# Patient Record
Sex: FEMALE | Race: WHITE | NOT HISPANIC OR LATINO | Employment: FULL TIME | URBAN - METROPOLITAN AREA
[De-identification: names, ages, dates, MRNs, and addresses within clinical notes are randomized per-mention and may not be internally consistent; named-entity substitution may affect disease eponyms.]

---

## 2024-09-19 ENCOUNTER — APPOINTMENT (OUTPATIENT)
Dept: CT IMAGING | Facility: HOSPITAL | Age: 44
End: 2024-09-19
Payer: COMMERCIAL

## 2024-09-19 ENCOUNTER — APPOINTMENT (OUTPATIENT)
Dept: GENERAL RADIOLOGY | Facility: HOSPITAL | Age: 44
End: 2024-09-19
Payer: COMMERCIAL

## 2024-09-19 ENCOUNTER — HOSPITAL ENCOUNTER (EMERGENCY)
Facility: HOSPITAL | Age: 44
Discharge: HOME | End: 2024-09-19
Attending: EMERGENCY MEDICINE
Payer: COMMERCIAL

## 2024-09-19 VITALS
RESPIRATION RATE: 20 BRPM | SYSTOLIC BLOOD PRESSURE: 159 MMHG | OXYGEN SATURATION: 98 % | HEIGHT: 62 IN | BODY MASS INDEX: 41.79 KG/M2 | DIASTOLIC BLOOD PRESSURE: 88 MMHG | WEIGHT: 227.1 LBS | HEART RATE: 99 BPM | TEMPERATURE: 97.3 F

## 2024-09-19 DIAGNOSIS — N83.202 CYSTS OF BOTH OVARIES: ICD-10-CM

## 2024-09-19 DIAGNOSIS — R93.5 ABNORMAL CT OF THE ABDOMEN: ICD-10-CM

## 2024-09-19 DIAGNOSIS — R10.84 GENERALIZED ABDOMINAL PAIN: ICD-10-CM

## 2024-09-19 DIAGNOSIS — M25.561 ACUTE PAIN OF RIGHT KNEE: ICD-10-CM

## 2024-09-19 DIAGNOSIS — N83.201 CYSTS OF BOTH OVARIES: ICD-10-CM
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
## 2024-09-19 DIAGNOSIS — R93.89 ABNORMAL CT OF THE CHEST: ICD-10-CM

## 2024-09-19 DIAGNOSIS — S20.219A CONTUSION OF CHEST WALL, UNSPECIFIED LATERALITY, INITIAL ENCOUNTER: Primary | ICD-10-CM

## 2024-09-19 DIAGNOSIS — M54.50 LEFT LUMBAR PAIN: ICD-10-CM

## 2024-09-19 LAB
ALBUMIN SERPL-MCNC: 4.4 G/DL (ref 3.5–5.2)
ALBUMIN/GLOB SERPL: 1.4 G/DL
ALP SERPL-CCNC: 63 U/L (ref 39–117)
ALT SERPL W P-5'-P-CCNC: 46 U/L (ref 1–33)
ANION GAP SERPL CALCULATED.3IONS-SCNC: 9.3 MMOL/L (ref 5–15)
AST SERPL-CCNC: 34 U/L (ref 1–32)
B-HCG UR QL: NEGATIVE
BACTERIA UR QL AUTO: ABNORMAL /HPF
BASOPHILS # BLD AUTO: 0.04 10*3/MM3 (ref 0–0.2)
BASOPHILS NFR BLD AUTO: 0.4 % (ref 0–1.5)
BILIRUB SERPL-MCNC: 0.3 MG/DL (ref 0–1.2)
BILIRUB UR QL STRIP: NEGATIVE
BUN SERPL-MCNC: 13 MG/DL (ref 6–20)
BUN/CREAT SERPL: 14 (ref 7–25)
CALCIUM SPEC-SCNC: 9.8 MG/DL (ref 8.6–10.5)
CHLORIDE SERPL-SCNC: 103 MMOL/L (ref 98–107)
CLARITY UR: CLEAR
CO2 SERPL-SCNC: 23.7 MMOL/L (ref 22–29)
COD CRY URNS QL: ABNORMAL /HPF
COLOR UR: YELLOW
CREAT SERPL-MCNC: 0.93 MG/DL (ref 0.57–1)
DEPRECATED RDW RBC AUTO: 45 FL (ref 37–54)
EGFRCR SERPLBLD CKD-EPI 2021: 78.9 ML/MIN/1.73
EOSINOPHIL # BLD AUTO: 0.13 10*3/MM3 (ref 0–0.4)
EOSINOPHIL NFR BLD AUTO: 1.2 % (ref 0.3–6.2)
ERYTHROCYTE [DISTWIDTH] IN BLOOD BY AUTOMATED COUNT: 12.8 % (ref 12.3–15.4)
GLOBULIN UR ELPH-MCNC: 3.1 GM/DL
GLUCOSE SERPL-MCNC: 85 MG/DL (ref 65–99)
GLUCOSE UR STRIP-MCNC: NEGATIVE MG/DL
HCT VFR BLD AUTO: 43.7 % (ref 34–46.6)
HGB BLD-MCNC: 14.2 G/DL (ref 12–15.9)
HGB UR QL STRIP.AUTO: ABNORMAL
HYALINE CASTS UR QL AUTO: ABNORMAL /LPF
IMM GRANULOCYTES # BLD AUTO: 0.04 10*3/MM3 (ref 0–0.05)
IMM GRANULOCYTES NFR BLD AUTO: 0.4 % (ref 0–0.5)
KETONES UR QL STRIP: ABNORMAL
LEUKOCYTE ESTERASE UR QL STRIP.AUTO: ABNORMAL
LYMPHOCYTES # BLD AUTO: 2.91 10*3/MM3 (ref 0.7–3.1)
LYMPHOCYTES NFR BLD AUTO: 27 % (ref 19.6–45.3)
MCH RBC QN AUTO: 31 PG (ref 26.6–33)
MCHC RBC AUTO-ENTMCNC: 32.5 G/DL (ref 31.5–35.7)
MCV RBC AUTO: 95.4 FL (ref 79–97)
MONOCYTES # BLD AUTO: 0.48 10*3/MM3 (ref 0.1–0.9)
MONOCYTES NFR BLD AUTO: 4.5 % (ref 5–12)
NEUTROPHILS NFR BLD AUTO: 66.5 % (ref 42.7–76)
NEUTROPHILS NFR BLD AUTO: 7.18 10*3/MM3 (ref 1.7–7)
NITRITE UR QL STRIP: NEGATIVE
PH UR STRIP.AUTO: 5.5 [PH] (ref 5–8)
PLATELET # BLD AUTO: 418 10*3/MM3 (ref 140–450)
PMV BLD AUTO: 9.9 FL (ref 6–12)
POTASSIUM SERPL-SCNC: 4.3 MMOL/L (ref 3.5–5.2)
PROT SERPL-MCNC: 7.5 G/DL (ref 6–8.5)
PROT UR QL STRIP: NEGATIVE
RBC # BLD AUTO: 4.58 10*6/MM3 (ref 3.77–5.28)
RBC # UR STRIP: ABNORMAL /HPF
REF LAB TEST METHOD: ABNORMAL
SODIUM SERPL-SCNC: 136 MMOL/L (ref 136–145)
SP GR UR STRIP: 1.02 (ref 1–1.03)
SQUAMOUS #/AREA URNS HPF: ABNORMAL /HPF
UROBILINOGEN UR QL STRIP: ABNORMAL
WBC # UR STRIP: ABNORMAL /HPF
WBC NRBC COR # BLD AUTO: 10.78 10*3/MM3 (ref 3.4–10.8)

## 2024-09-19 PROCEDURE — 81001 URINALYSIS AUTO W/SCOPE: CPT | Performed by: NURSE PRACTITIONER

## 2024-09-19 PROCEDURE — 87086 URINE CULTURE/COLONY COUNT: CPT | Performed by: NURSE PRACTITIONER

## 2024-09-19 PROCEDURE — 25510000001 IOPAMIDOL PER 1 ML: Performed by: EMERGENCY MEDICINE

## 2024-09-19 PROCEDURE — 85025 COMPLETE CBC W/AUTO DIFF WBC: CPT | Performed by: NURSE PRACTITIONER

## 2024-09-19 PROCEDURE — 81025 URINE PREGNANCY TEST: CPT | Performed by: NURSE PRACTITIONER

## 2024-09-19 PROCEDURE — 80053 COMPREHEN METABOLIC PANEL: CPT | Performed by: NURSE PRACTITIONER

## 2024-09-19 PROCEDURE — 74177 CT ABD & PELVIS W/CONTRAST: CPT

## 2024-09-19 PROCEDURE — 73560 X-RAY EXAM OF KNEE 1 OR 2: CPT

## 2024-09-19 PROCEDURE — 71260 CT THORAX DX C+: CPT

## 2024-09-19 PROCEDURE — 99285 EMERGENCY DEPT VISIT HI MDM: CPT

## 2024-09-19 RX ORDER — DIAZEPAM 5 MG
5 TABLET ORAL ONCE
Status: COMPLETED | OUTPATIENT
Start: 2024-09-19 | End: 2024-09-19

## 2024-09-19 RX ORDER — IOPAMIDOL 755 MG/ML
100 INJECTION, SOLUTION INTRAVASCULAR
Status: COMPLETED | OUTPATIENT
Start: 2024-09-19 | End: 2024-09-19

## 2024-09-19 RX ORDER — LIDOCAINE 50 MG/G
3 PATCH TOPICAL EVERY 24 HOURS
Qty: 5 PATCH | Refills: 0 | Status: SHIPPED | OUTPATIENT
Start: 2024-09-19

## 2024-09-19 RX ORDER — SODIUM CHLORIDE 0.9 % (FLUSH) 0.9 %
10 SYRINGE (ML) INJECTION AS NEEDED
Status: DISCONTINUED | OUTPATIENT
Start: 2024-09-19 | End: 2024-09-19 | Stop reason: HOSPADM

## 2024-09-19 RX ADMIN — DIAZEPAM 5 MG: 5 TABLET ORAL at 14:17

## 2024-09-19 RX ADMIN — IOPAMIDOL 100 ML: 755 INJECTION, SOLUTION INTRAVENOUS at 15:10

## 2024-09-20 LAB — BACTERIA SPEC AEROBE CULT: NO GROWTH

## 2024-09-30 ENCOUNTER — OFFICE VISIT (OUTPATIENT)
Dept: FAMILY MEDICINE CLINIC | Facility: CLINIC | Age: 44
End: 2024-09-30
Payer: COMMERCIAL

## 2024-09-30 VITALS
SYSTOLIC BLOOD PRESSURE: 120 MMHG | HEART RATE: 89 BPM | OXYGEN SATURATION: 98 % | HEIGHT: 63 IN | WEIGHT: 228.3 LBS | BODY MASS INDEX: 40.45 KG/M2 | TEMPERATURE: 97.5 F | RESPIRATION RATE: 18 BRPM | DIASTOLIC BLOOD PRESSURE: 82 MMHG

## 2024-09-30 DIAGNOSIS — N83.201 BILATERAL OVARIAN CYSTS: ICD-10-CM

## 2024-09-30 DIAGNOSIS — M51.369 LUMBAR DEGENERATIVE DISC DISEASE: ICD-10-CM

## 2024-09-30 DIAGNOSIS — Z76.89 ENCOUNTER TO ESTABLISH CARE: ICD-10-CM

## 2024-09-30 DIAGNOSIS — N83.202 BILATERAL OVARIAN CYSTS: ICD-10-CM

## 2024-09-30 DIAGNOSIS — N63.20 MASS OF LEFT BREAST, UNSPECIFIED QUADRANT: Primary | ICD-10-CM

## 2024-09-30 DIAGNOSIS — Z12.4 PAP SMEAR FOR CERVICAL CANCER SCREENING: ICD-10-CM

## 2024-09-30 DIAGNOSIS — K76.0 HEPATIC STEATOSIS: ICD-10-CM

## 2024-09-30 PROBLEM — L40.9 PSORIASIS: Status: ACTIVE | Noted: 2024-09-30

## 2024-09-30 PROCEDURE — 99213 OFFICE O/P EST LOW 20 MIN: CPT | Performed by: NURSE PRACTITIONER

## 2024-09-30 RX ORDER — CLOBETASOL PROPIONATE 0.5 MG/ML
SOLUTION TOPICAL
COMMUNITY
Start: 2024-09-05

## 2024-09-30 RX ORDER — CITALOPRAM HYDROBROMIDE 10 MG/1
10 TABLET ORAL DAILY
COMMUNITY
Start: 2024-09-11

## 2024-09-30 RX ORDER — BUPROPION HYDROCHLORIDE 150 MG/1
150 TABLET, FILM COATED, EXTENDED RELEASE ORAL 2 TIMES DAILY
COMMUNITY
Start: 2024-09-11

## 2024-09-30 RX ORDER — TRAZODONE HYDROCHLORIDE 50 MG/1
50 TABLET, FILM COATED ORAL NIGHTLY
COMMUNITY
Start: 2024-09-11
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
## 2024-10-03 ENCOUNTER — TREATMENT (OUTPATIENT)
Dept: PHYSICAL THERAPY | Facility: CLINIC | Age: 44
End: 2024-10-03
Payer: COMMERCIAL

## 2024-10-03 DIAGNOSIS — M53.3 SI (SACROILIAC) JOINT DYSFUNCTION: ICD-10-CM

## 2024-10-03 DIAGNOSIS — M54.50 LEFT LUMBAR PAIN: ICD-10-CM

## 2024-10-03 DIAGNOSIS — R26.9 GAIT ABNORMALITY: ICD-10-CM
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
## 2024-10-03 DIAGNOSIS — M25.561 ACUTE PAIN OF RIGHT KNEE: ICD-10-CM

## 2024-10-03 DIAGNOSIS — S20.219D CONTUSION OF CHEST WALL, UNSPECIFIED LATERALITY, SUBSEQUENT ENCOUNTER: Primary | ICD-10-CM
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
## 2024-10-10 ENCOUNTER — TREATMENT (OUTPATIENT)
Dept: PHYSICAL THERAPY | Facility: CLINIC | Age: 44
End: 2024-10-10
Payer: COMMERCIAL
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
## 2024-10-10 DIAGNOSIS — M53.3 SI (SACROILIAC) JOINT DYSFUNCTION: ICD-10-CM

## 2024-10-10 DIAGNOSIS — M54.50 LEFT LUMBAR PAIN: ICD-10-CM

## 2024-10-10 DIAGNOSIS — R26.9 GAIT ABNORMALITY: ICD-10-CM

## 2024-10-10 DIAGNOSIS — M25.561 ACUTE PAIN OF RIGHT KNEE: ICD-10-CM

## 2024-10-10 DIAGNOSIS — S20.219D CONTUSION OF CHEST WALL, UNSPECIFIED LATERALITY, SUBSEQUENT ENCOUNTER: Primary | ICD-10-CM
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
## 2024-10-15 ENCOUNTER — TELEPHONE (OUTPATIENT)
Dept: PHYSICAL THERAPY | Facility: CLINIC | Age: 44
End: 2024-10-15

## 2024-10-15 NOTE — TELEPHONE ENCOUNTER
Caller: Mary Ann Fletcher    Relationship: Self       What was the call regarding: WOKE UP SICK

## 2024-10-17 ENCOUNTER — HOSPITAL ENCOUNTER (OUTPATIENT)
Dept: ULTRASOUND IMAGING | Facility: HOSPITAL | Age: 44
Discharge: HOME | End: 2024-10-17
Payer: COMMERCIAL

## 2024-10-17 ENCOUNTER — TREATMENT (OUTPATIENT)
Dept: PHYSICAL THERAPY | Facility: CLINIC | Age: 44
End: 2024-10-17
Payer: COMMERCIAL

## 2024-10-17 ENCOUNTER — HOSPITAL ENCOUNTER (OUTPATIENT)
Dept: MAMMOGRAPHY | Facility: HOSPITAL | Age: 44
Discharge: HOME | End: 2024-10-17
Payer: COMMERCIAL

## 2024-10-17 DIAGNOSIS — S20.219D CONTUSION OF CHEST WALL, UNSPECIFIED LATERALITY, SUBSEQUENT ENCOUNTER: Primary | ICD-10-CM

## 2024-10-17 DIAGNOSIS — M53.3 SI (SACROILIAC) JOINT DYSFUNCTION: ICD-10-CM

## 2024-10-17 DIAGNOSIS — N63.20 MASS OF LEFT BREAST, UNSPECIFIED QUADRANT: ICD-10-CM

## 2024-10-17 DIAGNOSIS — R26.9 GAIT ABNORMALITY: ICD-10-CM

## 2024-10-17 DIAGNOSIS — M54.50 LEFT LUMBAR PAIN: ICD-10-CM

## 2024-10-17 DIAGNOSIS — M25.561 ACUTE PAIN OF RIGHT KNEE: ICD-10-CM
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
## 2024-10-17 PROCEDURE — 77066 DX MAMMO INCL CAD BI: CPT

## 2024-10-17 PROCEDURE — 76642 ULTRASOUND BREAST LIMITED: CPT

## 2024-10-17 PROCEDURE — 76882 US LMTD JT/FCL EVL NVASC XTR: CPT

## 2024-10-17 PROCEDURE — G0279 TOMOSYNTHESIS, MAMMO: HCPCS
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
## 2024-10-20 DIAGNOSIS — N63.20 MASS OF LEFT BREAST, UNSPECIFIED QUADRANT: Primary | ICD-10-CM

## 2024-10-31 ENCOUNTER — HOSPITAL ENCOUNTER (OUTPATIENT)
Dept: MAMMOGRAPHY | Facility: HOSPITAL | Age: 44
Discharge: HOME | End: 2024-10-31
Payer: COMMERCIAL

## 2024-10-31 ENCOUNTER — HOSPITAL ENCOUNTER (OUTPATIENT)
Dept: ULTRASOUND IMAGING | Facility: HOSPITAL | Age: 44
Discharge: HOME | End: 2024-10-31
Payer: COMMERCIAL

## 2024-10-31 DIAGNOSIS — N63.20 MASS OF LEFT BREAST ON MAMMOGRAM: ICD-10-CM

## 2024-10-31 DIAGNOSIS — R92.1 BREAST CALCIFICATIONS: ICD-10-CM

## 2024-10-31 DIAGNOSIS — R92.8 ABNORMAL MAMMOGRAM: ICD-10-CM

## 2024-10-31 PROCEDURE — 76942 ECHO GUIDE FOR BIOPSY: CPT

## 2024-10-31 PROCEDURE — A4648 IMPLANTABLE TISSUE MARKER: HCPCS

## 2024-10-31 PROCEDURE — 76098 X-RAY EXAM SURGICAL SPECIMEN: CPT

## 2024-10-31 PROCEDURE — 25010000002 LIDOCAINE 1 % SOLUTION: Performed by: NURSE PRACTITIONER

## 2024-10-31 PROCEDURE — 25010000002 LIDOCAINE 1% - EPINEPHRINE 1:100000 1 %-1:100000 SOLUTION: Performed by: NURSE PRACTITIONER

## 2024-10-31 RX ORDER — LIDOCAINE HYDROCHLORIDE 10 MG/ML
10 INJECTION, SOLUTION INFILTRATION; PERINEURAL ONCE
Status: COMPLETED | OUTPATIENT
Start: 2024-10-31 | End: 2024-10-31

## 2024-10-31 RX ORDER — LIDOCAINE HYDROCHLORIDE 10 MG/ML
3 INJECTION, SOLUTION INFILTRATION; PERINEURAL ONCE
Status: COMPLETED | OUTPATIENT
Start: 2024-10-31 | End: 2024-10-31

## 2024-10-31 RX ORDER — LIDOCAINE HYDROCHLORIDE AND EPINEPHRINE 10; 10 MG/ML; UG/ML
8 INJECTION, SOLUTION INFILTRATION; PERINEURAL ONCE
Status: COMPLETED | OUTPATIENT
Start: 2024-10-31 | End: 2024-10-31

## 2024-10-31 RX ORDER — LIDOCAINE HYDROCHLORIDE 10 MG/ML
5 INJECTION, SOLUTION INFILTRATION; PERINEURAL ONCE
Status: COMPLETED | OUTPATIENT
Start: 2024-10-31 | End: 2024-10-31

## 2024-10-31 RX ADMIN — LIDOCAINE HYDROCHLORIDE,EPINEPHRINE BITARTRATE 5 ML: 10; .01 INJECTION, SOLUTION INFILTRATION; PERINEURAL at 13:28

## 2024-10-31 RX ADMIN — Medication 5 ML: at 13:59

## 2024-10-31 RX ADMIN — LIDOCAINE HYDROCHLORIDE 10 ML: 10 INJECTION, SOLUTION INFILTRATION; PERINEURAL at 13:59

## 2024-10-31 RX ADMIN — Medication 1 ML: at 13:28

## 2024-11-04 ENCOUNTER — TELEPHONE (OUTPATIENT)
Dept: PHYSICAL THERAPY | Facility: CLINIC | Age: 44
End: 2024-11-04

## 2024-11-04 NOTE — TELEPHONE ENCOUNTER
LEFT MESSAGE FOR PATIENT TO CALL BACK REGARDING PT APPOINTMENT. ON SCHEDULE FOR TODAY 11/4 AND 11/7.

## 2024-11-05 ENCOUNTER — TELEPHONE (OUTPATIENT)
Dept: FAMILY MEDICINE CLINIC | Facility: CLINIC | Age: 44
End: 2024-11-05
Payer: COMMERCIAL

## 2024-11-07 ENCOUNTER — PATIENT OUTREACH (OUTPATIENT)
Dept: ONCOLOGY | Facility: CLINIC | Age: 44
End: 2024-11-07
Payer: COMMERCIAL

## 2024-11-07 ENCOUNTER — OFFICE VISIT (OUTPATIENT)
Age: 44
End: 2024-11-07
Payer: COMMERCIAL

## 2024-11-07 VITALS
BODY MASS INDEX: 40.4 KG/M2 | HEART RATE: 80 BPM | WEIGHT: 228 LBS | RESPIRATION RATE: 16 BRPM | HEIGHT: 63 IN | SYSTOLIC BLOOD PRESSURE: 131 MMHG | OXYGEN SATURATION: 98 % | DIASTOLIC BLOOD PRESSURE: 90 MMHG | TEMPERATURE: 98.7 F

## 2024-11-07 DIAGNOSIS — Z17.0 BREAST CANCER, STAGE 1, ESTROGEN RECEPTOR POSITIVE, LEFT: Primary | ICD-10-CM

## 2024-11-07 DIAGNOSIS — C50.912 BREAST CANCER, STAGE 1, ESTROGEN RECEPTOR POSITIVE, LEFT: Primary | ICD-10-CM

## 2024-11-07 LAB — GLOBAL PROGNOSTIC (BREAST) RESULT: NORMAL

## 2024-11-07 NOTE — LETTER
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 of additional surgery if her margins are close or positive after lumpectomy. She understands that breast conservation and mastectomy have shown equivalent long-term overall survival with 20-30 years of followup.     Unfortunately given the position of her tumor posteriorly and the extent of calcifications biopsy-proven to be DCIS extending to the nipple the volume of breast tissue to be removed precludes breast conservation with a reasonable cosmetic outcome and we discussed mastectomy.    With this in mind, I explained that even the earliest cancers can spread beyond the breast. One of the treatment goals is to determine the likelihood of micrometastatic disease and to treat it appropriately. The use of axillary staging was discussed as well as the use of a radioactive tracer and/or blue dye to locate the sentinel node. She was made aware that further axillary surgery maybe indicated and is dependent on final pathology results.      The use of sentinel node biopsy for axillary staging, along with the risks and benefits of the procedure itself, in both breast conservation and mastectomy were discussed, along with the possibility/reasons for needing to return for full axillary  dissection.     We then discussed how this information would be used. I explained that she will always receive some form of systemic therapy.  Adjuvant systemic therapy including chemotherapy and endocrine were reviewed, and we would anticipate endocrine therapy postoperatively for 5-10 years. She understands the final recommendations for systemic therapy will be based on the pathologic results from surgery and will be administered by a medical oncologist; we also discussed that in patients with negative nodes, we typically recommend Oncotype testing to aid in postop systemic therapy considerations.     Radiation therapy was reviewed.  Final treatment recommendations for radiation therapy will be based upon pathologic stage and will be administered by a radiation oncologist.     Finally, I brought up the issue of genetic counseling and testing.  Given her age she will be referred to genetic counseling.    The risks and benefits of the surgical procedure were discussed with the patient, including but not limited to bleeding, infection, scar formation and lymphedema.  Cosmetic ramifications of the surgical procedure were reviewed. I reviewed the risks of the SLNB procedure including lymphedema and sensory changes in the axilla/arm.    Given the size of her tumor, if the HER2 receptor is positive there is a possibility of neoadjuvant therapy and she will be referred to medical oncology for further discussion of systemic therapy.  The sequence of therapy will depend on HER2 status.      Class 3 Severe Obesity (BMI >=40). Obesity-related health conditions include the following: none. Obesity is unchanged. BMI is  above average, had been on GLP-1 antagonist but holding these during workup for her breast cancer . We discussed portion control, increasing exercise, and the relationship between excess body fat and breast cancer risk. .         Signed:    Gonzalez Vanessa MD  Breast Surgical Oncology  [facility]
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
## 2024-11-07 NOTE — PROGRESS NOTES
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 beyond the breast. One of the treatment goals is to determine the likelihood of micrometastatic disease and to treat it appropriately. The use of axillary staging was discussed as well as the use of a radioactive tracer and/or blue dye to locate the sentinel node. She was made aware that further axillary surgery maybe indicated and is dependent on final pathology results.      The use of sentinel node biopsy for axillary staging, along with the risks and benefits of the procedure itself, in both breast conservation and mastectomy were discussed, along with the possibility/reasons for needing to return for full axillary dissection.     We then discussed how this information would be used. I explained that she will always receive some form of systemic therapy.  Adjuvant systemic therapy including chemotherapy and endocrine were reviewed, and we would anticipate endocrine therapy postoperatively for 5-10 years. She understands the final recommendations for systemic therapy will be based on the pathologic results from surgery and will be administered by a medical oncologist; we also discussed that in patients with negative nodes, we typically recommend Oncotype testing to aid in postop systemic therapy considerations.     Radiation therapy was reviewed.  Final  treatment recommendations for radiation therapy will be based upon pathologic stage and will be administered by a radiation oncologist.     Finally, I brought up the issue of genetic counseling and testing.  Given her age she will be referred to genetic counseling.    The risks and benefits of the surgical procedure were discussed with the patient, including but not limited to bleeding, infection, scar formation and lymphedema.  Cosmetic ramifications of the surgical procedure were reviewed. I reviewed the risks of the SLNB procedure including lymphedema and sensory changes in the axilla/arm.    Given the size of her tumor, if the HER2 receptor is positive there is a possibility of neoadjuvant therapy and she will be referred to medical oncology for further discussion of systemic therapy.  The sequence of therapy will depend on HER2 status.      Class 3 Severe Obesity (BMI >=40). Obesity-related health conditions include the following: none. Obesity is unchanged. BMI is  above average, had been on GLP-1 antagonist but holding these during workup for her breast cancer . We discussed portion control, increasing exercise, and the relationship between excess body fat and breast cancer risk. .         Signed:    Gonzalez Vanessa MD  Breast Surgical Oncology  [facility]

## 2024-11-09 LAB — HER2/NEU RESULT: NORMAL

## 2024-11-10 LAB — CCV RESULT: NORMAL

## 2024-11-11 LAB
LAB AP CASE REPORT: NORMAL
LAB AP DIAGNOSIS COMMENT: NORMAL
LAB AP FISH HER2/NEU REPORT,ADDENDUM: NORMAL
LAB AP IHC HER2/NEU REPORT,ADDENDUM: NORMAL
LAB AP SPECIAL STAINS: NORMAL
PATH REPORT.FINAL DX SPEC: NORMAL
PATH REPORT.GROSS SPEC: NORMAL
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
## 2024-11-13 ENCOUNTER — PATIENT OUTREACH (OUTPATIENT)
Dept: ONCOLOGY | Facility: CLINIC | Age: 44
End: 2024-11-13
Payer: COMMERCIAL

## 2024-11-13 ENCOUNTER — LAB (OUTPATIENT)
Dept: LAB | Facility: HOSPITAL | Age: 44
End: 2024-11-13
Payer: COMMERCIAL

## 2024-11-13 ENCOUNTER — CONSULT (OUTPATIENT)
Dept: ONCOLOGY | Facility: CLINIC | Age: 44
End: 2024-11-13
Payer: COMMERCIAL

## 2024-11-13 VITALS
WEIGHT: 232 LBS | HEIGHT: 63 IN | DIASTOLIC BLOOD PRESSURE: 70 MMHG | HEART RATE: 78 BPM | SYSTOLIC BLOOD PRESSURE: 120 MMHG | OXYGEN SATURATION: 98 % | TEMPERATURE: 98 F | BODY MASS INDEX: 41.11 KG/M2 | RESPIRATION RATE: 18 BRPM

## 2024-11-13 DIAGNOSIS — Z17.0 BREAST CANCER, STAGE 1, ESTROGEN RECEPTOR POSITIVE, LEFT: Primary | ICD-10-CM

## 2024-11-13 DIAGNOSIS — C50.912 BREAST CANCER, STAGE 1, ESTROGEN RECEPTOR POSITIVE, LEFT: Primary | ICD-10-CM

## 2024-11-13 DIAGNOSIS — Z51.81 ENCOUNTER FOR MONITORING CARDIOTOXIC DRUG THERAPY: ICD-10-CM

## 2024-11-13 DIAGNOSIS — Z79.899 ENCOUNTER FOR MONITORING CARDIOTOXIC DRUG THERAPY: ICD-10-CM

## 2024-11-13 LAB
BASOPHILS # BLD AUTO: 0.03 10*3/MM3 (ref 0–0.2)
BASOPHILS NFR BLD AUTO: 0.3 % (ref 0–1.5)
DEPRECATED RDW RBC AUTO: 45.5 FL (ref 37–54)
EOSINOPHIL # BLD AUTO: 0.12 10*3/MM3 (ref 0–0.4)
EOSINOPHIL NFR BLD AUTO: 1.2 % (ref 0.3–6.2)
ERYTHROCYTE [DISTWIDTH] IN BLOOD BY AUTOMATED COUNT: 13.4 % (ref 12.3–15.4)
HCT VFR BLD AUTO: 40.4 % (ref 34–46.6)
HGB BLD-MCNC: 13.4 G/DL (ref 12–15.9)
HOLD SPECIMEN: NORMAL
HOLD SPECIMEN: NORMAL
LYMPHOCYTES # BLD AUTO: 3.88 10*3/MM3 (ref 0.7–3.1)
LYMPHOCYTES NFR BLD AUTO: 39.4 % (ref 19.6–45.3)
MCH RBC QN AUTO: 32.2 PG (ref 26.6–33)
MCHC RBC AUTO-ENTMCNC: 33.2 G/DL (ref 31.5–35.7)
MCV RBC AUTO: 97.1 FL (ref 79–97)
MONOCYTES # BLD AUTO: 0.5 10*3/MM3 (ref 0.1–0.9)
MONOCYTES NFR BLD AUTO: 5.1 % (ref 5–12)
NEUTROPHILS NFR BLD AUTO: 5.32 10*3/MM3 (ref 1.7–7)
NEUTROPHILS NFR BLD AUTO: 54 % (ref 42.7–76)
PLATELET # BLD AUTO: 350 10*3/MM3 (ref 140–450)
PMV BLD AUTO: 10.3 FL (ref 6–12)
RBC # BLD AUTO: 4.16 10*6/MM3 (ref 3.77–5.28)
WBC NRBC COR # BLD AUTO: 9.85 10*3/MM3 (ref 3.4–10.8)

## 2024-11-13 PROCEDURE — 85025 COMPLETE CBC W/AUTO DIFF WBC: CPT

## 2024-11-13 PROCEDURE — 36415 COLL VENOUS BLD VENIPUNCTURE: CPT

## 2024-11-13 NOTE — PROGRESS NOTES
I accompanied the patient and her sister to her appointment with Dr. Saldivar.    Dr. Saldivar discussed the patient's pathology and imaging results.  The patient's Her2 came back positive.  Dr. Saldivar discussed that she would like for the patient to have a Breast MRI to help determine the true size of the breast mass and to determine if there is chest wall invasion.      If the tumor is larger or invading the chest wall, she will have neoadjuvant chemotherapy with TCHP.    If the tumor is not changed in size, then she will proceed with surgery and then she will proceed with adjuvant chemotherapy with TH.    The patient will need a 2D echo.                                                         Breast mass measures 5 x 3.5 cm up on exam.     Breast MRI 11/20/24 at 4:30pm  2D echo on 11/19/24 at 7:30am.    The patient was given several resources to review.
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
## 2024-11-15 ENCOUNTER — OFFICE VISIT (OUTPATIENT)
Dept: FAMILY MEDICINE CLINIC | Facility: CLINIC | Age: 44
End: 2024-11-15
Payer: COMMERCIAL

## 2024-11-15 VITALS
DIASTOLIC BLOOD PRESSURE: 82 MMHG | WEIGHT: 235 LBS | HEART RATE: 103 BPM | TEMPERATURE: 96.5 F | OXYGEN SATURATION: 98 % | RESPIRATION RATE: 18 BRPM | SYSTOLIC BLOOD PRESSURE: 126 MMHG | HEIGHT: 63 IN | BODY MASS INDEX: 41.64 KG/M2

## 2024-11-15 DIAGNOSIS — F41.9 ANXIETY AND DEPRESSION: Primary | ICD-10-CM

## 2024-11-15 DIAGNOSIS — F32.A ANXIETY AND DEPRESSION: Primary | ICD-10-CM

## 2024-11-15 PROCEDURE — 99214 OFFICE O/P EST MOD 30 MIN: CPT | Performed by: NURSE PRACTITIONER

## 2024-11-15 RX ORDER — PROPRANOLOL HYDROCHLORIDE 10 MG/1
10 TABLET ORAL 3 TIMES DAILY PRN
Qty: 90 TABLET | Refills: 0 | Status: SHIPPED | OUTPATIENT
Start: 2024-11-15

## 2024-11-15 RX ORDER — BUPROPION HYDROCHLORIDE 150 MG/1
150 TABLET ORAL DAILY
Qty: 90 TABLET | Refills: 0 | Status: SHIPPED | OUTPATIENT
Start: 2024-11-15

## 2024-11-15 RX ORDER — VILAZODONE HYDROCHLORIDE 20 MG/1
20 TABLET ORAL DAILY
Qty: 90 TABLET | Refills: 0 | Status: SHIPPED | OUTPATIENT
Start: 2024-11-15

## 2024-11-18 ENCOUNTER — HOSPITAL ENCOUNTER (OUTPATIENT)
Dept: MRI IMAGING | Facility: HOSPITAL | Age: 44
Discharge: HOME | End: 2024-11-18
Admitting: INTERNAL MEDICINE
Payer: COMMERCIAL

## 2024-11-18 DIAGNOSIS — C50.912 BREAST CANCER, STAGE 1, ESTROGEN RECEPTOR POSITIVE, LEFT: ICD-10-CM

## 2024-11-18 DIAGNOSIS — Z17.0 BREAST CANCER, STAGE 1, ESTROGEN RECEPTOR POSITIVE, LEFT: ICD-10-CM

## 2024-11-18 PROCEDURE — A9579 GAD-BASE MR CONTRAST NOS,1ML: HCPCS | Performed by: INTERNAL MEDICINE

## 2024-11-18 PROCEDURE — 77049 MRI BREAST C-+ W/CAD BI: CPT

## 2024-11-18 PROCEDURE — 25010000002 GADOTERIDOL PER 1 ML: Performed by: INTERNAL MEDICINE

## 2024-11-18 RX ADMIN — GADOTERIDOL 20 ML: 279.3 INJECTION, SOLUTION INTRAVENOUS at 13:38

## 2024-11-19 ENCOUNTER — PATIENT OUTREACH (OUTPATIENT)
Dept: ONCOLOGY | Facility: CLINIC | Age: 44
End: 2024-11-19
Payer: COMMERCIAL

## 2024-11-19 ENCOUNTER — HOSPITAL ENCOUNTER (OUTPATIENT)
Dept: CARDIOLOGY | Facility: HOSPITAL | Age: 44
Discharge: HOME | End: 2024-11-19
Admitting: INTERNAL MEDICINE
Payer: COMMERCIAL

## 2024-11-19 VITALS
HEIGHT: 63 IN | HEART RATE: 74 BPM | SYSTOLIC BLOOD PRESSURE: 162 MMHG | BODY MASS INDEX: 41.64 KG/M2 | DIASTOLIC BLOOD PRESSURE: 66 MMHG | WEIGHT: 235 LBS

## 2024-11-19 DIAGNOSIS — Z17.0 BREAST CANCER, STAGE 1, ESTROGEN RECEPTOR POSITIVE, LEFT: ICD-10-CM

## 2024-11-19 DIAGNOSIS — Z51.81 ENCOUNTER FOR MONITORING CARDIOTOXIC DRUG THERAPY: ICD-10-CM

## 2024-11-19 DIAGNOSIS — C50.912 BREAST CANCER, STAGE 1, ESTROGEN RECEPTOR POSITIVE, LEFT: ICD-10-CM

## 2024-11-19 DIAGNOSIS — C50.912 BREAST CANCER, STAGE 2, LEFT: Primary | ICD-10-CM

## 2024-11-19 DIAGNOSIS — Z79.899 ENCOUNTER FOR MONITORING CARDIOTOXIC DRUG THERAPY: ICD-10-CM

## 2024-11-19 LAB
BH CV ECHO LEFT VENTRICLE GLOBAL LONGITUDINAL STRAIN: -18 %
BH CV ECHO MEAS - AO MAX PG: 5.9 MMHG
BH CV ECHO MEAS - AO MEAN PG: 3.3 MMHG
BH CV ECHO MEAS - AO ROOT DIAM: 2.9 CM
BH CV ECHO MEAS - AO V2 MAX: 121 CM/SEC
BH CV ECHO MEAS - AO V2 VTI: 26.7 CM
BH CV ECHO MEAS - AVA(I,D): 1.86 CM2
BH CV ECHO MEAS - EDV(CUBED): 60.1 ML
BH CV ECHO MEAS - EDV(MOD-SP4): 82.1 ML
BH CV ECHO MEAS - EF(MOD-BP): 60 %
BH CV ECHO MEAS - EF(MOD-SP4): 59.9 %
BH CV ECHO MEAS - ESV(CUBED): 24.6 ML
BH CV ECHO MEAS - ESV(MOD-SP4): 32.9 ML
BH CV ECHO MEAS - FS: 25.7 %
BH CV ECHO MEAS - IVS/LVPW: 1.14 CM
BH CV ECHO MEAS - IVSD: 1.02 CM
BH CV ECHO MEAS - LA DIMENSION: 3.9 CM
BH CV ECHO MEAS - LV MASS(C)D: 115.8 GRAMS
BH CV ECHO MEAS - LV MAX PG: 1.63 MMHG
BH CV ECHO MEAS - LV MEAN PG: 0.92 MMHG
BH CV ECHO MEAS - LV V1 MAX: 63.9 CM/SEC
BH CV ECHO MEAS - LV V1 VTI: 13.7 CM
BH CV ECHO MEAS - LVIDD: 3.9 CM
BH CV ECHO MEAS - LVIDS: 2.9 CM
BH CV ECHO MEAS - LVOT AREA: 3.6 CM2
BH CV ECHO MEAS - LVOT DIAM: 2.15 CM
BH CV ECHO MEAS - LVPWD: 0.9 CM
BH CV ECHO MEAS - MR MAX PG: 62.5 MMHG
BH CV ECHO MEAS - MR MAX VEL: 391.4 CM/SEC
BH CV ECHO MEAS - MV A MAX VEL: 65.1 CM/SEC
BH CV ECHO MEAS - MV DEC SLOPE: 366.6 CM/SEC2
BH CV ECHO MEAS - MV DEC TIME: 0.19 SEC
BH CV ECHO MEAS - MV E MAX VEL: 67.8 CM/SEC
BH CV ECHO MEAS - MV E/A: 1.04
BH CV ECHO MEAS - MV MAX PG: 2.06 MMHG
BH CV ECHO MEAS - MV MEAN PG: 1 MMHG
BH CV ECHO MEAS - MV V2 VTI: 18 CM
BH CV ECHO MEAS - MVA(VTI): 2.8 CM2
BH CV ECHO MEAS - PA V2 MAX: 89 CM/SEC
BH CV ECHO MEAS - PULM A REVS DUR: 0.12 SEC
BH CV ECHO MEAS - PULM A REVS VEL: 30.5 CM/SEC
BH CV ECHO MEAS - PULM DIAS VEL: 34.9 CM/SEC
BH CV ECHO MEAS - PULM S/D: 1.79
BH CV ECHO MEAS - PULM SYS VEL: 62.4 CM/SEC
BH CV ECHO MEAS - RAP SYSTOLE: 3 MMHG
BH CV ECHO MEAS - RV MAX PG: 0.88 MMHG
BH CV ECHO MEAS - RV V1 MAX: 46.9 CM/SEC
BH CV ECHO MEAS - RV V1 VTI: 10.5 CM
BH CV ECHO MEAS - RVSP: 16.2 MMHG
BH CV ECHO MEAS - SV(LVOT): 49.8 ML
BH CV ECHO MEAS - SV(MOD-SP4): 49.2 ML
BH CV ECHO MEAS - TR MAX PG: 13.2 MMHG
BH CV ECHO MEAS - TR MAX VEL: 180 CM/SEC

## 2024-11-19 PROCEDURE — 93356 MYOCRD STRAIN IMG SPCKL TRCK: CPT

## 2024-11-19 PROCEDURE — 93356 MYOCRD STRAIN IMG SPCKL TRCK: CPT | Performed by: STUDENT IN AN ORGANIZED HEALTH CARE EDUCATION/TRAINING PROGRAM

## 2024-11-19 PROCEDURE — 93306 TTE W/DOPPLER COMPLETE: CPT

## 2024-11-19 PROCEDURE — 93306 TTE W/DOPPLER COMPLETE: CPT | Performed by: STUDENT IN AN ORGANIZED HEALTH CARE EDUCATION/TRAINING PROGRAM

## 2024-11-19 NOTE — PROGRESS NOTES
I discussed the patient's Breast MRI with Dr. Saldivar and Dr. Vanessa this morning.  Dr. Saldivar stated that she would like to order PET scan and move forward with neoadjuvant chemotherapy.  Dr. Vanessa stated that she would get her in for a port placement.    Elham was able to get the patient scheduled for 11/25 at 1:30pm.      I called and discussed the results of the Breast MRI with the patient.  I let her know that I had reviewed the results with Dr. Saldivra and that she would like for the patient to have a PET scan completed and to proceed with neoadjuvant chemotherapy.  I let the patient know that Dr. Vanessa's office will be calling her to schedule her a port placement.    The patient voiced understanding.  She had requested being scheduled for a massage.  I spoke with Micki and there was an opening with Emily today.    I notified the patient.    I spoke with Dr. Saldivar and she requested that the patient see her this week. I notified Shira and she was able to get the patient scheduled for 11/21 to see Dr. Saldivar.      On 11/20/2024:  I reviewed the patient's chart and the patient's PET scan had been rescheduled due to her port placement being scheduled for 11/25 as well.  I called the patient to see if I could get her PET scheduled for 11/25, would she be fine with doing both the PET and port placement on the same day.  She stated that she would try to make it work.  She stated that she would like to start treatment after Thanksgiving.  I notified Dr. Saldivar.

## 2024-11-20 ENCOUNTER — LAB (OUTPATIENT)
Dept: LAB | Facility: HOSPITAL | Age: 44
End: 2024-11-20
Payer: COMMERCIAL

## 2024-11-20 ENCOUNTER — HOSPITAL ENCOUNTER (OUTPATIENT)
Dept: CARDIOLOGY | Facility: HOSPITAL | Age: 44
Discharge: HOME | End: 2024-11-20
Payer: COMMERCIAL

## 2024-11-20 DIAGNOSIS — C50.912 BREAST CANCER, STAGE 1, ESTROGEN RECEPTOR POSITIVE, LEFT: ICD-10-CM

## 2024-11-20 DIAGNOSIS — Z17.0 BREAST CANCER, STAGE 1, ESTROGEN RECEPTOR POSITIVE, LEFT: ICD-10-CM

## 2024-11-20 DIAGNOSIS — C50.912 BREAST CANCER, STAGE 2, LEFT: ICD-10-CM

## 2024-11-20 LAB
ANION GAP SERPL CALCULATED.3IONS-SCNC: 10 MMOL/L (ref 5–15)
BASOPHILS # BLD AUTO: 0.03 10*3/MM3 (ref 0–0.2)
BASOPHILS NFR BLD AUTO: 0.2 % (ref 0–1.5)
BUN SERPL-MCNC: 16 MG/DL (ref 6–20)
BUN/CREAT SERPL: 16 (ref 7–25)
CALCIUM SPEC-SCNC: 9.3 MG/DL (ref 8.6–10.5)
CHLORIDE SERPL-SCNC: 105 MMOL/L (ref 98–107)
CO2 SERPL-SCNC: 24 MMOL/L (ref 22–29)
CREAT SERPL-MCNC: 1 MG/DL (ref 0.57–1)
DEPRECATED RDW RBC AUTO: 48 FL (ref 37–54)
EGFRCR SERPLBLD CKD-EPI 2021: 72.3 ML/MIN/1.73
EOSINOPHIL # BLD AUTO: 0.17 10*3/MM3 (ref 0–0.4)
EOSINOPHIL NFR BLD AUTO: 1.3 % (ref 0.3–6.2)
ERYTHROCYTE [DISTWIDTH] IN BLOOD BY AUTOMATED COUNT: 13.5 % (ref 12.3–15.4)
GLUCOSE SERPL-MCNC: 83 MG/DL (ref 65–99)
HCT VFR BLD AUTO: 41.6 % (ref 34–46.6)
HGB BLD-MCNC: 13.4 G/DL (ref 12–15.9)
IMM GRANULOCYTES # BLD AUTO: 0.12 10*3/MM3 (ref 0–0.05)
IMM GRANULOCYTES NFR BLD AUTO: 0.9 % (ref 0–0.5)
LYMPHOCYTES # BLD AUTO: 4.33 10*3/MM3 (ref 0.7–3.1)
LYMPHOCYTES NFR BLD AUTO: 33.8 % (ref 19.6–45.3)
MCH RBC QN AUTO: 31.1 PG (ref 26.6–33)
MCHC RBC AUTO-ENTMCNC: 32.2 G/DL (ref 31.5–35.7)
MCV RBC AUTO: 96.5 FL (ref 79–97)
MONOCYTES # BLD AUTO: 0.59 10*3/MM3 (ref 0.1–0.9)
MONOCYTES NFR BLD AUTO: 4.6 % (ref 5–12)
NEUTROPHILS NFR BLD AUTO: 59.2 % (ref 42.7–76)
NEUTROPHILS NFR BLD AUTO: 7.58 10*3/MM3 (ref 1.7–7)
NRBC BLD AUTO-RTO: 0 /100 WBC (ref 0–0.2)
PLATELET # BLD AUTO: 410 10*3/MM3 (ref 140–450)
PMV BLD AUTO: 10.3 FL (ref 6–12)
POTASSIUM SERPL-SCNC: 4.2 MMOL/L (ref 3.5–5.2)
QT INTERVAL: 395 MS
QTC INTERVAL: 427 MS
RBC # BLD AUTO: 4.31 10*6/MM3 (ref 3.77–5.28)
SODIUM SERPL-SCNC: 139 MMOL/L (ref 136–145)
WBC NRBC COR # BLD AUTO: 12.82 10*3/MM3 (ref 3.4–10.8)

## 2024-11-20 PROCEDURE — 80048 BASIC METABOLIC PNL TOTAL CA: CPT

## 2024-11-20 PROCEDURE — 36415 COLL VENOUS BLD VENIPUNCTURE: CPT

## 2024-11-20 PROCEDURE — 85025 COMPLETE CBC W/AUTO DIFF WBC: CPT

## 2024-11-20 PROCEDURE — 93005 ELECTROCARDIOGRAM TRACING: CPT | Performed by: SURGERY
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
## 2024-11-21 ENCOUNTER — OFFICE VISIT (OUTPATIENT)
Dept: ONCOLOGY | Facility: CLINIC | Age: 44
End: 2024-11-21
Payer: COMMERCIAL

## 2024-11-21 VITALS
RESPIRATION RATE: 18 BRPM | BODY MASS INDEX: 41.46 KG/M2 | WEIGHT: 234 LBS | OXYGEN SATURATION: 97 % | TEMPERATURE: 98 F | HEIGHT: 63 IN | DIASTOLIC BLOOD PRESSURE: 82 MMHG | SYSTOLIC BLOOD PRESSURE: 125 MMHG | HEART RATE: 88 BPM

## 2024-11-21 DIAGNOSIS — C50.912 BREAST CANCER, STAGE 2, LEFT: Primary | ICD-10-CM

## 2024-11-21 RX ORDER — PANTOPRAZOLE SODIUM 40 MG/1
40 TABLET, DELAYED RELEASE ORAL DAILY
Qty: 30 TABLET | Refills: 6 | Status: SHIPPED | OUTPATIENT
Start: 2024-11-21

## 2024-11-21 RX ORDER — LIDOCAINE/PRILOCAINE 2.5 %-2.5%
1 CREAM (GRAM) TOPICAL TAKE AS DIRECTED
Qty: 30 G | Refills: 3 | Status: SHIPPED | OUTPATIENT
Start: 2024-11-21
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
## 2024-11-21 NOTE — PATIENT INSTRUCTIONS
Taxotere - Docetaxel Injection  What is this medication?  DOCETAXEL (orellana se TAX el) treats some types of cancer. It works by slowing down the growth of cancer cells.    This medicine may be used for other purposes; ask your health care provider or pharmacist if you have questions.    COMMON BRAND NAME(S): Docefrez, Docivyx, Taxotere    What should I tell my care team before I take this medication?  They need to know if you have any of these conditions:    Kidney disease  Liver disease  Low white blood cell levels  Tingling of the fingers or toes or other nerve disorder  An unusual or allergic reaction to docetaxel, polysorbate 80, other medications, foods, dyes, or preservatives  Pregnant or trying to get pregnant  Breast-feeding  How should I use this medication?  This medication is injected into a vein. It is given by your care team in a hospital or clinic setting.    Talk to your care team about the use of this medication in children. Special care may be needed.    Overdosage: If you think you have taken too much of this medicine contact a poison control center or emergency room at once.    NOTE: This medicine is only for you. Do not share this medicine with others.    What if I miss a dose?  Keep appointments for follow-up doses. It is important not to miss your dose. Call your care team if you are unable to keep an appointment.    What may interact with this medication?  Do not take this medication with any of the following:    Live virus vaccines  This medication may also interact with the following:    Certain antibiotics, such as clarithromycin, telithromycin  Certain antivirals for HIV or hepatitis  Certain medications for fungal infections, such as itraconazole, ketoconazole, voriconazole  Grapefruit juice  Nefazodone  Supplements, such as Victoria's wort  This list may not describe all possible interactions. Give your health care provider a list of all the medicines, herbs, non-prescription drugs, or  dietary supplements you use. Also tell them if you smoke, drink alcohol, or use illegal drugs. Some items may interact with your medicine.    What should I watch for while using this medication?  This medication may make you feel generally unwell. This is not uncommon as chemotherapy can affect healthy cells as well as cancer cells. Report any side effects. Continue your course of treatment even though you feel ill unless your care team tells you to stop.    You may need blood work done while you are taking this medication.    This medication can cause serious side effects and infusion reactions. To reduce the risk, your care team may give you other medications to take before receiving this one. Be sure to follow the directions from your care team.    This medication may increase your risk of getting an infection. Call your care team for advice if you get a fever, chills, sore throat, or other symptoms of a cold or flu. Do not treat yourself. Try to avoid being around people who are sick.    Avoid taking medications that contain aspirin, acetaminophen, ibuprofen, naproxen, or ketoprofen unless instructed by your care team. These medications may hide a fever.    Be careful brushing or flossing your teeth or using a toothpick because you may get an infection or bleed more easily. If you have any dental work done, tell your dentist you are receiving this medication.    Some products may contain alcohol. Ask your care team if this medication contains alcohol. Be sure to tell all care teams you are taking this medicine. Certain medications, like metronidazole and disulfiram, can cause an unpleasant reaction when taken with alcohol. The reaction includes flushing, headache, nausea, vomiting, sweating, and increased thirst. The reaction can last from 30 minutes to several hours.    This medication may affect your coordination, reaction time, or judgement. Do not drive or operate machinery until you know how this medication  affects you. Sit up or stand slowly to reduce the risk of dizzy or fainting spells. Drinking alcohol with this medication can increase the risk of these side effects.    Talk to your care team about your risk of cancer. You may be more at risk for certain types of cancer if you take this medication.    Talk to your care team if you wish to become pregnant or think you might be pregnant. This medication can cause serious birth defects if taken during pregnancy or if you get pregnant within 2 months after stopping therapy. A negative pregnancy test is required before starting this medication. A reliable form of contraception is recommended while taking this medication and for 2 months after stopping it. Talk to your care team about reliable forms of contraception.    Do not breast-feed while taking this medication and for 1 week after stopping therapy.    Use a condom during sex and for 4 months after stopping therapy. Tell your care team right away if you think your partner might be pregnant. This medication can cause serious birth defects.    This medication may cause infertility. Talk to your care team if you are concerned about your fertility.    What side effects may I notice from receiving this medication?  Side effects that you should report to your care team as soon as possible:    Allergic reactions--skin rash, itching, hives, swelling of the face, lips, tongue, or throat  Change in vision such as blurry vision, seeing halos around lights, vision loss  Infection--fever, chills, cough, or sore throat  Infusion reactions--chest pain, shortness of breath or trouble breathing, feeling faint or lightheaded  Low red blood cell level--unusual weakness or fatigue, dizziness, headache, trouble breathing  Pain, tingling, or numbness in the hands or feet  Painful swelling, warmth, or redness of the skin, blisters or sores at the infusion site  Redness, blistering, peeling, or loosening of the skin, including inside the  mouth  Sudden or severe stomach pain, bloody diarrhea, fever, nausea, vomiting  Swelling of the ankles, hands, or feet  Tumor lysis syndrome (TLS)--nausea, vomiting, diarrhea, decrease in the amount of urine, dark urine, unusual weakness or fatigue, confusion, muscle pain or cramps, fast or irregular heartbeat, joint pain  Unusual bruising or bleeding  Side effects that usually do not require medical attention (report to your care team if they continue or are bothersome):    Change in nail shape, thickness, or color  Change in taste  Hair loss  Increased tears  This list may not describe all possible side effects. Call your doctor for medical advice about side effects. You may report side effects to FDA at 1-836-ZRP-7331.    Where should I keep my medication?  This medication is given in a hospital or clinic. It will not be stored at home.    NOTE: This sheet is a summary. It may not cover all possible information. If you have questions about this medicine, talk to your doctor, pharmacist, or health care provider.    © 2024 [employer]/Gold Standard (2023-02-23 00:00:00)    Additional Information From [employer] About Taxotere  Self-Care Tips:  You may be at risk of infection so try to avoid crowds or people with colds or not feeling well, and report fever or any other signs of infection immediately to your health care provider.  Wash your hands often.  To help treat/prevent mouth sores, use a soft toothbrush, and rinse three times a day with 1/2 to 1 teaspoon of baking soda and/or 1/2 to 1 teaspoon of salt mixed with 8 ounces of water.  Use an electric razor and a soft toothbrush to minimize bleeding.  Avoid contact sports or activities that could cause injury.  To reduce nausea, take anti-nausea medications as prescribed by your doctor, and eat small, frequent meals.    Avoid sun exposure. Wear SPF 15 (or higher) sunblock and protective clothing. Drink at least two to three quarts of fluid every 24 hours, unless  you are instructed otherwise.  In general, drinking alcoholic beverages should be kept to a minimum or avoided completely. You should discuss this with your doctor.  Get plenty of rest.   Maintain good nutrition.  If you experience symptoms or side effects, be sure to discuss them with your health care team. They can prescribe medications and/or offer other suggestions that are effective in managing such problems.      When to contact your doctor or health care provider:  Contact your health care provider immediately, day or night, if you should experience any of the following symptoms:    Fever of 100.4° F (38° C) or higher, chills (possible signs of infection)  The following symptoms require medical attention, but are not an emergency. Contact your health care provider within 24 hours of noticing any of the following:    Nausea (interferes with ability to eat and unrelieved with prescribed medication).  Vomiting (vomiting more than 4-5 times in a 24 hour period).  Diarrhea (4-6 episodes in a 24-hour period).  Unusual bleeding or bruising.  Black or tarry stools, or blood in your stools or urine.  Extreme fatigue (unable to carry on self-care activities).  Mouth sores (painful redness, swelling or ulcers).  Yellowing of the skin or eyes.  Swelling of the ankles. Weight gain. Swelling of the stomach.   Shortness of breath.  Always inform your health care provider if you experience any unusual symptoms.              Carboplatin Injection  What is this medication?  CARBOPLATIN (SANDOR iliana wilbur tin) treats some types of cancer. It works by slowing down the growth of cancer cells.    This medicine may be used for other purposes; ask your health care provider or pharmacist if you have questions.    COMMON BRAND NAME(S): Paraplatin    What should I tell my care team before I take this medication?  They need to know if you have any of these conditions:    Blood disorders  Hearing problems  Kidney disease  Recent or ongoing  radiation therapy  An unusual or allergic reaction to carboplatin, cisplatin, other medications, foods, dyes, or preservatives  Pregnant or trying to get pregnant  Breast-feeding  How should I use this medication?  This medication is injected into a vein. It is given by your care team in a hospital or clinic setting.    Talk to your care team about the use of this medication in children. Special care may be needed.    Overdosage: If you think you have taken too much of this medicine contact a poison control center or emergency room at once.    NOTE: This medicine is only for you. Do not share this medicine with others.    What if I miss a dose?  Keep appointments for follow-up doses. It is important not to miss your dose. Call your care team if you are unable to keep an appointment.    What may interact with this medication?  Medications for seizures  Some antibiotics, such as amikacin, gentamicin, neomycin, streptomycin, tobramycin  Vaccines  This list may not describe all possible interactions. Give your health care provider a list of all the medicines, herbs, non-prescription drugs, or dietary supplements you use. Also tell them if you smoke, drink alcohol, or use illegal drugs. Some items may interact with your medicine.    What should I watch for while using this medication?  Your condition will be monitored carefully while you are receiving this medication. You may need blood work while taking this medication.    This medication may make you feel generally unwell. This is not uncommon, as chemotherapy can affect healthy cells as well as cancer cells. Report any side effects. Continue your course of treatment even though you feel ill unless your care team tells you to stop.    In some cases, you may be given additional medications to help with side effects. Follow all directions for their use.    This medication may increase your risk of getting an infection. Call your care team for advice if you get a fever,  chills, sore throat, or other symptoms of a cold or flu. Do not treat yourself. Try to avoid being around people who are sick.    Avoid taking medications that contain aspirin, acetaminophen, ibuprofen, naproxen, or ketoprofen unless instructed by your care team. These medications may hide a fever.    Be careful brushing or flossing your teeth or using a toothpick because you may get an infection or bleed more easily. If you have any dental work done, tell your dentist you are receiving this medication.    Talk to your care team if you wish to become pregnant or think you might be pregnant. This medication can cause serious birth defects. Talk to your care team about effective forms of contraception.    Do not breast-feed while taking this medication.    What side effects may I notice from receiving this medication?  Side effects that you should report to your care team as soon as possible:    Allergic reactions--skin rash, itching, hives, swelling of the face, lips, tongue, or throat  Infection--fever, chills, cough, sore throat, wounds that don't heal, pain or trouble when passing urine, general feeling of discomfort or being unwell  Low red blood cell level--unusual weakness or fatigue, dizziness, headache, trouble breathing  Pain, tingling, or numbness in the hands or feet, muscle weakness, change in vision, confusion or trouble speaking, loss of balance or coordination, trouble walking, seizures  Unusual bruising or bleeding  Side effects that usually do not require medical attention (report to your care team if they continue or are bothersome):    Hair loss  Nausea  Unusual weakness or fatigue  Vomiting  This list may not describe all possible side effects. Call your doctor for medical advice about side effects. You may report side effects to FDA at 4-442-FDA-0444.    Where should I keep my medication?  This medication is given in a hospital or clinic. It will not be stored at home.    NOTE: This sheet is a  summary. It may not cover all possible information. If you have questions about this medicine, talk to your doctor, pharmacist, or health care provider.    © 2024 [employer]/Gold Standard (2023-04-11 00:00:00)    Additional Information From [employer] About Carboplatin  Carboplatin Self-Care Tips:  Drink at least two to three quarts of fluid every 24 hours, unless you are instructed otherwise.  You may be at risk of infection so try to avoid crowds or people with colds, and report fever or any other signs of infection immediately to your health care provider.  Wash your hands often.  To help treat/prevent mouth sores, use a soft toothbrush, and rinse three times a day with 1/2 to 1 teaspoon of baking soda and/or 1/2 to 1 teaspoon of salt mixed with 8 ounces of water.  Use an electric razor and a soft toothbrush to minimize bleeding.  Avoid contact sports or activities that could cause injury.  To reduce nausea, take anti-nausea medications as prescribed by your doctor, and eat small, frequent meals.    Avoid sun exposure.  Wear SPF 15 (or higher) sunblock and protective clothing.  In general, drinking alcoholic beverages should be kept to a minimum or avoided completely.  You should discuss this with your doctor.  Get plenty of rest.   Maintain good nutrition.  If you experience symptoms or side effects, be sure to discuss them with your health care team.  They can prescribe medications and/or offer other suggestions that are effective in managing such problems.    When To Contact Your Doctor or Health Care Provider:  Contact your health care provider immediately, day or night, if you should experience any of the following symptoms:    Fever of 100.4(F (38(C) or higher, or chills (possible signs of infection).  Difficulty breathing or shortness of breath.  Chest pain.  The following symptoms require medical attention, but are not an emergency.  Contact your health care provider within 24 hours of noticing any of  the following:    Unusual bleeding or bruising  Black or tarry stools, or blood in your stools or urine  Diarrhea (4-6 episodes in a 24-hour period)  Nausea (interferes with ability to eat and unrelieved with prescribed medications).  Vomiting (vomiting more than 4-5 times in a 24-hour period)  Severe abdominal pain  Lip or mouth sores (painful redness, swelling or ulcers)  Extreme fatigue (unable to carry on self-care activities)  Muscle cramps or twitching  Change in hearing  Dizziness, confusion or visual changes  Always inform your health care provider if you experience any unusual symptoms.

## 2024-11-24 ENCOUNTER — ANESTHESIA EVENT (OUTPATIENT)
Dept: PERIOP | Facility: HOSPITAL | Age: 44
End: 2024-11-24
Payer: COMMERCIAL

## 2024-11-25 ENCOUNTER — APPOINTMENT (OUTPATIENT)
Dept: GENERAL RADIOLOGY | Facility: HOSPITAL | Age: 44
End: 2024-11-25
Payer: COMMERCIAL

## 2024-11-25 ENCOUNTER — ANESTHESIA (OUTPATIENT)
Dept: PERIOP | Facility: HOSPITAL | Age: 44
End: 2024-11-25
Payer: COMMERCIAL

## 2024-11-25 ENCOUNTER — HOSPITAL ENCOUNTER (OUTPATIENT)
Dept: PET IMAGING | Facility: HOSPITAL | Age: 44
End: 2024-11-25
Payer: COMMERCIAL

## 2024-11-25 ENCOUNTER — HOSPITAL ENCOUNTER (OUTPATIENT)
Facility: HOSPITAL | Age: 44
Discharge: HOME | End: 2024-11-25
Attending: SURGERY | Admitting: SURGERY
Payer: COMMERCIAL

## 2024-11-25 VITALS
OXYGEN SATURATION: 94 % | HEART RATE: 88 BPM | DIASTOLIC BLOOD PRESSURE: 63 MMHG | WEIGHT: 236 LBS | TEMPERATURE: 97.8 F | SYSTOLIC BLOOD PRESSURE: 115 MMHG | BODY MASS INDEX: 41.82 KG/M2 | RESPIRATION RATE: 13 BRPM | HEIGHT: 63 IN

## 2024-11-25 LAB — B-HCG UR QL: NEGATIVE

## 2024-11-25 PROCEDURE — 81025 URINE PREGNANCY TEST: CPT | Performed by: SURGERY

## 2024-11-25 PROCEDURE — 71045 X-RAY EXAM CHEST 1 VIEW: CPT

## 2024-11-25 PROCEDURE — 25010000002 ONDANSETRON PER 1 MG

## 2024-11-25 PROCEDURE — 76000 FLUOROSCOPY <1 HR PHYS/QHP: CPT

## 2024-11-25 PROCEDURE — 25010000002 FENTANYL CITRATE (PF) 50 MCG/ML SOLUTION

## 2024-11-25 PROCEDURE — 25010000002 LIDOCAINE PF 2% 2 % SOLUTION

## 2024-11-25 PROCEDURE — 25010000002 LIDOCAINE 1% - EPINEPHRINE 1:100000 1 %-1:100000 SOLUTION: Performed by: SURGERY

## 2024-11-25 PROCEDURE — 25010000002 DEXAMETHASONE PER 1 MG

## 2024-11-25 PROCEDURE — 25810000003 LACTATED RINGERS PER 1000 ML: Performed by: ANESTHESIOLOGY

## 2024-11-25 PROCEDURE — 25810000003 LACTATED RINGERS PER 1000 ML

## 2024-11-25 PROCEDURE — C1788 PORT, INDWELLING, IMP: HCPCS | Performed by: SURGERY

## 2024-11-25 PROCEDURE — 25010000002 HEPARIN (PORCINE) PER 1000 UNITS: Performed by: SURGERY

## 2024-11-25 PROCEDURE — 36561 INSERT TUNNELED CV CATH: CPT | Performed by: SURGERY

## 2024-11-25 PROCEDURE — 25010000002 PROPOFOL 10 MG/ML EMULSION

## 2024-11-25 PROCEDURE — 76937 US GUIDE VASCULAR ACCESS: CPT | Performed by: SURGERY

## 2024-11-25 PROCEDURE — 25010000002 CEFAZOLIN PER 500 MG: Performed by: SURGERY

## 2024-11-25 PROCEDURE — 77001 FLUOROGUIDE FOR VEIN DEVICE: CPT | Performed by: SURGERY

## 2024-11-25 DEVICE — POWERPORT CLEARVUE ISP IMPLANTABLE PORT WITH ATTACHABLE 8F POLYURETHANE OPEN-ENDED SINGLE-LUMEN VENOUS CATHETER PROCEDURAL KIT
Type: IMPLANTABLE DEVICE | Site: CHEST | Status: FUNCTIONAL
Brand: POWERPORT CLEARVUE

## 2024-11-25 RX ORDER — SODIUM CHLORIDE 0.9 % (FLUSH) 0.9 %
10 SYRINGE (ML) INJECTION EVERY 12 HOURS SCHEDULED
Status: DISCONTINUED | OUTPATIENT
Start: 2024-11-25 | End: 2024-11-25 | Stop reason: HOSPADM

## 2024-11-25 RX ORDER — ONDANSETRON 2 MG/ML
INJECTION INTRAMUSCULAR; INTRAVENOUS AS NEEDED
Status: DISCONTINUED | OUTPATIENT
Start: 2024-11-25 | End: 2024-11-25 | Stop reason: SURG

## 2024-11-25 RX ORDER — FLUMAZENIL 0.1 MG/ML
0.2 INJECTION INTRAVENOUS AS NEEDED
Status: DISCONTINUED | OUTPATIENT
Start: 2024-11-25 | End: 2024-11-25 | Stop reason: HOSPADM

## 2024-11-25 RX ORDER — SODIUM CHLORIDE 0.9 % (FLUSH) 0.9 %
10 SYRINGE (ML) INJECTION AS NEEDED
Status: DISCONTINUED | OUTPATIENT
Start: 2024-11-25 | End: 2024-11-25 | Stop reason: HOSPADM

## 2024-11-25 RX ORDER — EPHEDRINE SULFATE 5 MG/ML
5 INJECTION INTRAVENOUS ONCE AS NEEDED
Status: DISCONTINUED | OUTPATIENT
Start: 2024-11-25 | End: 2024-11-25 | Stop reason: HOSPADM

## 2024-11-25 RX ORDER — LABETALOL HYDROCHLORIDE 5 MG/ML
5 INJECTION, SOLUTION INTRAVENOUS
Status: DISCONTINUED | OUTPATIENT
Start: 2024-11-25 | End: 2024-11-25 | Stop reason: HOSPADM

## 2024-11-25 RX ORDER — BUPIVACAINE HYDROCHLORIDE AND EPINEPHRINE 5; 5 MG/ML; UG/ML
INJECTION, SOLUTION EPIDURAL; INTRACAUDAL; PERINEURAL AS NEEDED
Status: DISCONTINUED | OUTPATIENT
Start: 2024-11-25 | End: 2024-11-25 | Stop reason: HOSPADM

## 2024-11-25 RX ORDER — IPRATROPIUM BROMIDE AND ALBUTEROL SULFATE 2.5; .5 MG/3ML; MG/3ML
3 SOLUTION RESPIRATORY (INHALATION) ONCE AS NEEDED
Status: DISCONTINUED | OUTPATIENT
Start: 2024-11-25 | End: 2024-11-25 | Stop reason: HOSPADM

## 2024-11-25 RX ORDER — OXYCODONE HYDROCHLORIDE 5 MG/1
10 TABLET ORAL EVERY 4 HOURS PRN
Status: DISCONTINUED | OUTPATIENT
Start: 2024-11-25 | End: 2024-11-25 | Stop reason: HOSPADM

## 2024-11-25 RX ORDER — FENTANYL CITRATE 50 UG/ML
25 INJECTION, SOLUTION INTRAMUSCULAR; INTRAVENOUS
Status: DISCONTINUED | OUTPATIENT
Start: 2024-11-25 | End: 2024-11-25 | Stop reason: HOSPADM

## 2024-11-25 RX ORDER — DEXAMETHASONE SODIUM PHOSPHATE 4 MG/ML
INJECTION, SOLUTION INTRA-ARTICULAR; INTRALESIONAL; INTRAMUSCULAR; INTRAVENOUS; SOFT TISSUE AS NEEDED
Status: DISCONTINUED | OUTPATIENT
Start: 2024-11-25 | End: 2024-11-25 | Stop reason: SURG

## 2024-11-25 RX ORDER — SODIUM CHLORIDE, SODIUM LACTATE, POTASSIUM CHLORIDE, CALCIUM CHLORIDE 600; 310; 30; 20 MG/100ML; MG/100ML; MG/100ML; MG/100ML
9 INJECTION, SOLUTION INTRAVENOUS CONTINUOUS PRN
Status: DISCONTINUED | OUTPATIENT
Start: 2024-11-25 | End: 2024-11-25 | Stop reason: HOSPADM

## 2024-11-25 RX ORDER — OXYCODONE HYDROCHLORIDE 5 MG/1
5 TABLET ORAL ONCE AS NEEDED
Status: DISCONTINUED | OUTPATIENT
Start: 2024-11-25 | End: 2024-11-25 | Stop reason: HOSPADM

## 2024-11-25 RX ORDER — DEXMEDETOMIDINE HYDROCHLORIDE 100 UG/ML
INJECTION, SOLUTION INTRAVENOUS AS NEEDED
Status: DISCONTINUED | OUTPATIENT
Start: 2024-11-25 | End: 2024-11-25 | Stop reason: SURG

## 2024-11-25 RX ORDER — LIDOCAINE HYDROCHLORIDE 20 MG/ML
INJECTION, SOLUTION EPIDURAL; INFILTRATION; INTRACAUDAL; PERINEURAL AS NEEDED
Status: DISCONTINUED | OUTPATIENT
Start: 2024-11-25 | End: 2024-11-25 | Stop reason: SURG

## 2024-11-25 RX ORDER — HEPARIN SODIUM 1000 [USP'U]/ML
INJECTION, SOLUTION INTRAVENOUS; SUBCUTANEOUS AS NEEDED
Status: DISCONTINUED | OUTPATIENT
Start: 2024-11-25 | End: 2024-11-25 | Stop reason: HOSPADM

## 2024-11-25 RX ORDER — SODIUM CHLORIDE, SODIUM LACTATE, POTASSIUM CHLORIDE, CALCIUM CHLORIDE 600; 310; 30; 20 MG/100ML; MG/100ML; MG/100ML; MG/100ML
INJECTION, SOLUTION INTRAVENOUS CONTINUOUS PRN
Status: DISCONTINUED | OUTPATIENT
Start: 2024-11-25 | End: 2024-11-25 | Stop reason: SURG

## 2024-11-25 RX ORDER — ONDANSETRON 2 MG/ML
4 INJECTION INTRAMUSCULAR; INTRAVENOUS ONCE AS NEEDED
Status: COMPLETED | OUTPATIENT
Start: 2024-11-25 | End: 2024-11-25

## 2024-11-25 RX ORDER — FENTANYL CITRATE 50 UG/ML
INJECTION, SOLUTION INTRAMUSCULAR; INTRAVENOUS AS NEEDED
Status: DISCONTINUED | OUTPATIENT
Start: 2024-11-25 | End: 2024-11-25 | Stop reason: SURG

## 2024-11-25 RX ORDER — PROPOFOL 10 MG/ML
VIAL (ML) INTRAVENOUS AS NEEDED
Status: DISCONTINUED | OUTPATIENT
Start: 2024-11-25 | End: 2024-11-25 | Stop reason: SURG

## 2024-11-25 RX ORDER — HYDRALAZINE HYDROCHLORIDE 20 MG/ML
5 INJECTION INTRAMUSCULAR; INTRAVENOUS
Status: DISCONTINUED | OUTPATIENT
Start: 2024-11-25 | End: 2024-11-25 | Stop reason: HOSPADM

## 2024-11-25 RX ORDER — DIPHENHYDRAMINE HYDROCHLORIDE 50 MG/ML
12.5 INJECTION INTRAMUSCULAR; INTRAVENOUS
Status: DISCONTINUED | OUTPATIENT
Start: 2024-11-25 | End: 2024-11-25 | Stop reason: HOSPADM

## 2024-11-25 RX ORDER — LIDOCAINE HYDROCHLORIDE AND EPINEPHRINE 10; 10 MG/ML; UG/ML
INJECTION, SOLUTION INFILTRATION; PERINEURAL AS NEEDED
Status: DISCONTINUED | OUTPATIENT
Start: 2024-11-25 | End: 2024-11-25 | Stop reason: HOSPADM

## 2024-11-25 RX ORDER — DIPHENHYDRAMINE HYDROCHLORIDE 50 MG/ML
12.5 INJECTION INTRAMUSCULAR; INTRAVENOUS ONCE AS NEEDED
Status: DISCONTINUED | OUTPATIENT
Start: 2024-11-25 | End: 2024-11-25 | Stop reason: HOSPADM

## 2024-11-25 RX ORDER — NALOXONE HCL 0.4 MG/ML
0.4 VIAL (ML) INJECTION AS NEEDED
Status: DISCONTINUED | OUTPATIENT
Start: 2024-11-25 | End: 2024-11-25 | Stop reason: HOSPADM

## 2024-11-25 RX ADMIN — FENTANYL CITRATE 50 MCG: 50 INJECTION, SOLUTION INTRAMUSCULAR; INTRAVENOUS at 09:07

## 2024-11-25 RX ADMIN — ONDANSETRON 4 MG: 2 INJECTION, SOLUTION INTRAMUSCULAR; INTRAVENOUS at 10:36

## 2024-11-25 RX ADMIN — SODIUM CHLORIDE, SODIUM LACTATE, POTASSIUM CHLORIDE, AND CALCIUM CHLORIDE: .6; .31; .03; .02 INJECTION, SOLUTION INTRAVENOUS at 08:56

## 2024-11-25 RX ADMIN — DEXMEDETOMIDINE HYDROCHLORIDE 10 MCG: 100 INJECTION, SOLUTION, CONCENTRATE INTRAVENOUS at 09:47

## 2024-11-25 RX ADMIN — PROPOFOL 50 MG: 10 INJECTION, EMULSION INTRAVENOUS at 09:03

## 2024-11-25 RX ADMIN — DEXAMETHASONE SODIUM PHOSPHATE 8 MG: 4 INJECTION, SOLUTION INTRAMUSCULAR; INTRAVENOUS at 09:18

## 2024-11-25 RX ADMIN — FENTANYL CITRATE 25 MCG: 50 INJECTION, SOLUTION INTRAMUSCULAR; INTRAVENOUS at 10:55

## 2024-11-25 RX ADMIN — ONDANSETRON 4 MG: 2 INJECTION, SOLUTION INTRAMUSCULAR; INTRAVENOUS at 09:18

## 2024-11-25 RX ADMIN — FENTANYL CITRATE 25 MCG: 50 INJECTION, SOLUTION INTRAMUSCULAR; INTRAVENOUS at 10:41

## 2024-11-25 RX ADMIN — LIDOCAINE HYDROCHLORIDE 100 MG: 20 INJECTION, SOLUTION EPIDURAL; INFILTRATION; INTRACAUDAL; PERINEURAL at 09:02

## 2024-11-25 RX ADMIN — SODIUM CHLORIDE, SODIUM LACTATE, POTASSIUM CHLORIDE, CALCIUM CHLORIDE 9 ML/HR: 20; 30; 600; 310 INJECTION, SOLUTION INTRAVENOUS at 08:48

## 2024-11-25 RX ADMIN — SODIUM CHLORIDE 2000 MG: 900 INJECTION INTRAVENOUS at 08:49

## 2024-11-25 RX ADMIN — FENTANYL CITRATE 50 MCG: 50 INJECTION, SOLUTION INTRAMUSCULAR; INTRAVENOUS at 09:18

## 2024-11-25 RX ADMIN — DEXMEDETOMIDINE HYDROCHLORIDE 10 MCG: 100 INJECTION, SOLUTION, CONCENTRATE INTRAVENOUS at 09:33

## 2024-11-25 RX ADMIN — PROPOFOL 200 MG: 10 INJECTION, EMULSION INTRAVENOUS at 09:02

## 2024-11-25 NOTE — OP NOTE
Operative Note    11/25/2024    PRE-OP DIAGNOSIS: Breast cancer, stage 1, estrogen receptor positive, left [C50.912, Z17.0]    POST-OP DIAGNOSIS: Post-Op Diagnosis Codes:     * Breast cancer, stage 1, estrogen receptor positive, left [C50.912, Z17.0]    Procedure(s):  INSERTION OF PORTACATH RIGHT INTERNAL JUGULAR  Fluoroscopic guidance for central venous access device placement    SURGEON: Surgeons and Role:     * Gonzalez Vanessa MD - Primary      ANESTHESIA: General    IMPLANT(S):   Implant Name Type Inv. Item Serial No.  Lot No. LRB No. Used Action   KT PRT POWERPORT CLEARVUE MOD/BUMP INTERMD 8F 45CM - [id number] Implant KT PRT POWERPORT CLEARVUE MOD/BUMP INTERMD 8F 45CM  [employer] [id number] 1 Implanted       SPECIMEN(S):  None    OPERATIVE FINDINGS: Uncomplicated placement of a right internal jugular Port-A-Cath.  The port easily flushed and catherine after placement and the catheter tip was in good position at the atriocaval junction on fluoroscopy.    INDICATION FOR PROCEDURE: This patient presents with a history of a cT2 cN0 Mx G3 ER+ (Strong, 100%) NJ+ (Strong, 95%) Her2 Positive (2+ IHC, Amplified on FISH), clinical anatomic and prognostic stage IA invasive ductal carcinoma of the LEFT breast with DCIS (ER/NJ+ G2, solid and cribriform types with comedonecrosis).     PROCEDURE IN DETAIL: Mary Ann Fletcher is a 44 y.o. female  has elected to undergo Port-A-Cath placement. The patient was informed of the possible risks and complications of the procedure, including but not limited to anesthetic risks, bleeding, infection, damage to surrounding structures including pneumothorax, and need for additional surgery. The patient concurred with the proposed plan, and has given informed consent.  The site of surgery was properly noted/marked in the preoperative holding area.    The patient was brought to the operating room and placed in the supine position with both upper extremities tucked.  General anesthesia with an LMA was administered.  Preoperative antibiotics were given.  A time out was performed confirming the patient, site, and procedure. The right chest and neck was then prepped and draped in the usual sterile fashion.    She was placed in Trendelenburg position and the right internal jugular vein was evaluated with ultrasound.  It was accessed with a needle..  Dark venous blood returned.  A wire was then passed through the needle and position of the wire was confirmed in the right internal jugular vein coursing down to the atriocaval junction by fluoroscopy.     Following injection of lidocaine, a 15 blade scalpel was used to make a skin incision 3 cm below the clavicle in the midclavicular line and a pocket was developed inferiorly with electrocautery and blunt dissection.  Hemostasis was ensured.  Next a 11 blade scalpel was used to make a small skin incision at the wire.  Using the trocar, the catheter was passed from the right neck incision to the right chest port pocket.  A dilator was passed over the wire and the wire was removed.  The catheter was advanced through the dilator and fluoroscopy was used to confirm good position of the catheter at the atriocaval junction at 19 cm at the port pocket.  The dilator was then removed and the catheter cut to size.  The port body was attached and place into the pocket.  The port easily flushed and catherine.  The port was flushed with heparinized saline.  Fluoroscopy was again used to confirm good position of the catheter in the subclavian vein to the atriocaval junction.  Hemostasis was confirmed.     Quarter percent Marcaine was injected for local anesthesia.  The incision was closed with 3.0 Vicryl deep dermal interrupted stitches and a 4.0 monocyl running subcuticular stitch.  Skin glue was applied to the incision.  At the end of the operation, all sponge, instrument, and needle counts x 2 were correct.     A chest x-ray was ordered to be performed  in the PACU.    ESTIMATED BLOOD LOSS: 5 cc    COMPLICATIONS: none    DISPOSITION: stable PACU

## 2024-11-25 NOTE — ANESTHESIA POSTPROCEDURE EVALUATION
Patient: Mary Ann Fletcher    Procedure Summary       Date: 11/25/24 Room / Location: [facility] OR 03 / [facility] MAIN OR    Anesthesia Start: 0856 Anesthesia Stop: 1004    Procedure: INSERTION OF PORTACATH RIGHT INTERNAL JUGULAR (Right) Diagnosis:       Breast cancer, stage 1, estrogen receptor positive, left      (Breast cancer, stage 1, estrogen receptor positive, left [C50.912, Z17.0])    Surgeons: Gonzalez Vanessa MD Provider: Gonzalez Grider MD    Anesthesia Type: general ASA Status: 3            Anesthesia Type: general    Vitals  Vitals Value Taken Time   /65 11/25/24 1035   Temp 98.2 °F (36.8 °C) 11/25/24 1002   Pulse 75 11/25/24 1036   Resp 15 11/25/24 1027   SpO2 92 % 11/25/24 1036   Vitals shown include unfiled device data.        Post Anesthesia Care and Evaluation    Patient location during evaluation: PACU  Patient participation: complete - patient participated  Level of consciousness: awake  Pain scale: See nurse's notes for pain score.  Pain management: adequate    Airway patency: patent  Anesthetic complications: No anesthetic complications  PONV Status: none  Cardiovascular status: acceptable  Respiratory status: acceptable and spontaneous ventilation  Hydration status: acceptable    Comments: Patient seen and examined postoperatively; vital signs stable; SpO2 greater than or equal to 90%; cardiopulmonary status stable; nausea/vomiting adequately controlled; pain adequately controlled; no apparent anesthesia complications; patient discharged from anesthesia care when discharge criteria were met

## 2024-11-25 NOTE — DISCHARGE INSTRUCTIONS
Post-Op Discharge Instructions - Port-A-Cath placement    Incision Care  You may shower 24 hours after your surgery.  There will be skin glue. You can gently wash the area regardless, and pat dry. Leave the skin glue on until it wears off on its own.  There will be no stitches on the surface of your incision. They are under the skin and will dissolve over time.  Do not soak in a bath tub or go swimming for 4 weeks.    Pain Medication Instructions - Without Narcotics (Opioids):  Take your pain medicine as directed by your surgeon and printed on the prescription bottle  As your pain lessens, decrease the amount of pain medicine you are taking.  Recommend alternating anti-inflammatory medicine ibuprofen (600mg) and Tylenol (500 mg) every 3-4 hours in the first 24-48 hours after surgery even if pain is minimal.  When taking Tylenol, keeping in mind:  Do not take more than 3,000 mg of Tylenol in 24 hours.    Alternative Pain Relief Recommendations:  You should use an icepack on your incision, 20 minutes on and off for the first 48 hours after surgery to reduce swelling, and as needed after that.     Activity  Walking is encouraged. You may climb stairs.  Move your shoulder on the surgery side as tolerated. Don't let it stiffen up  Restart your normal daily activities when you are able. Slowly start to do more each day as tolerated.  Wearing a firm bra - okay to exercise, go to the gym, etc.  Avoid any contact sports.  You may drive, as long as you are not taking any narcotic pain medicine    After your breast surgery, call your doctor if you have:  Fevers higher than 101.5° F  Chills  Redness around your incision  Drainage from your incision  Increased swelling at the surgery site  Swelling, bruising, or pain in your breast that is getting worse and does not get better with pain medicine  Chest pain or trouble breathing  Any other symptoms that worry you  Remember: In an emergency, ALWAYS call 911

## 2024-11-25 NOTE — ANESTHESIA PROCEDURE NOTES
Airway  Urgency: elective    Date/Time: 11/25/2024 9:04 AM    General Information and Staff    Patient location during procedure: OR  CRNA/CAA: Humera Barajas CRNA    Indications and Patient Condition  Indications for airway management: airway protection    Preoxygenated: yes  Mask difficulty assessment: 0 - not attempted    Final Airway Details  Final airway type: supraglottic airway      Successful airway: I-gel  Size 4     Number of attempts at approach: 1  Assessment: lips, teeth, and gum same as pre-op and atraumatic intubation

## 2024-11-25 NOTE — ANESTHESIA PREPROCEDURE EVALUATION
Anesthesia Evaluation     Patient summary reviewed and Nursing notes reviewed   no history of anesthetic complications:   NPO Solid Status: > 8 hours  NPO Liquid Status: > 2 hours           Airway   Dental      Pulmonary    (+) a smoker Former,  Cardiovascular     ECG reviewed  Patient on routine beta blocker        Neuro/Psych  (+) psychiatric history Anxiety and Depression  GI/Hepatic/Renal/Endo    (+) morbid obesity, GERD, liver disease fatty liver disease    Musculoskeletal     Abdominal    Substance History      OB/GYN          Other   arthritis,   history of cancer    ROS/Med Hx Other: Additional History:  Breast cancer, ovarian cysts    PSH:  TONSILLECTOMY US GUIDED LYMPH NODE BIOPSY  BREAST BIOPSY               Anesthesia Plan    ASA 3     general     (Patient identified; pre-operative vital signs, all relevant labs/studies, complete medical/surgical/anesthetic history, full medication list, full allergy list, and NPO status obtained/reviewed; physical assessment performed; anesthetic options, side effects, potential complications, risks, and benefits discussed; questions answered; written anesthesia consent obtained; patient cleared for procedure; anesthesia machine and equipment checked and functioning)  intravenous induction     Anesthetic plan, risks, benefits, and alternatives have been provided, discussed and informed consent has been obtained with: patient.    Plan discussed with CRNA and CAA.    CODE STATUS:

## 2024-11-25 NOTE — H&P
Surgically focused H&P    MEDICAL ONCOLOGIST: Dr. Saldivar      HISTORY OF PRESENT ILLNESS    This is a 44 y.o. female with cT2 cN0 Mx G3 ER+ (Strong, 100%) IL+ (Strong, 95%) Her2 Positive (2+ IHC, Amplified on FISH), clinical anatomic and prognostic stage IA invasive ductal carcinoma of the LEFT breast with DCIS (ER/IL+ G2, solid and cribriform types with comedonecrosis).     She is planning on undergoing neoadjuvant targeted chemotherapy with Taxotere carboplatinum, Herceptin and Perjeta q. 21 days for 6 cycles neoadjuvant treatment. She presents for port placement today.      PAST MEDICAL HISTORY:  Past Medical History:   Diagnosis Date    Anxiety     Cancer     Left Breast         Dr Saldivar    Depression     Obesity     Most adult life       PAST SURGICAL HISTORY:  Past Surgical History:   Procedure Laterality Date    BREAST BIOPSY  10/31    Left side    TONSILLECTOMY      US GUIDED LYMPH NODE BIOPSY  10/31/2024       She denies any issues with general anesthesia.    Family History:  Family History   Problem Relation Age of Onset    Depression Mother     Diabetes Mother     Depression Sister         Sister    Diabetes Maternal Grandmother         Passed away    Esophageal cancer Maternal Grandmother        She has a family history significant for esophageal cancer in her maternal grandmother.  She denies any family history of breast cancer, prostate cancer, colon cancer, ovarian cancer, pancreatic cancer, or melanoma.     She is not of Ashkenazi Hinduism descent.  She has never been genetically tested.    SOCIAL HISTORY:  Social History     Tobacco Use    Smoking status: Former     Types: Cigarettes     Start date: 2022     Passive exposure: Past    Smokeless tobacco: Never    Tobacco comments:     On and off for many years early twenties. Quit for kultiple years. Then sporadically last few years.   Vaping Use    Vaping status: Never Used   Substance Use Topics    Alcohol use: Yes     Alcohol/week: 1.0 standard drink  "of alcohol     Types: 1 Cans of beer per week     Comment: One beer maybe 1 a month.    Drug use: Never       Patient works as at [employer] in [place].  Patient denies any smoking, alcohol or drug use.  Her sister will be her primary point of support at home through treatment.       Allergies:   Allergies   Allergen Reactions    Penicillins Hives     Beta lactam allergy details  Antibiotic reaction: hives  Age at reaction: infant  Dose to reaction time: unknown  Reason for antibiotic: unknown  Epinephrine required for reaction?: no  Tolerated antibiotics: unknown            Review of systems: A 14 point review of systems was obtained and was negative    PHYSICAL EXAM     Pulse 86   Temp 98.3 °F (36.8 °C) (Oral)   Resp 16   Ht 160 cm (63\")   Wt 107 kg (236 lb)   LMP 10/27/2024 (Exact Date)   SpO2 99%   BMI 41.81 kg/m²     General appearance:alert, appears stated age, and cooperative  Cardiac: normal rate and regular rhythm, well perfused. No significant peripheral edema.  Respiratory: clear to auscultation bilaterally, equal bilateral chest rise with normal effort on room air  Breast Exam:  Left breast ecchymosis resolving. No skin changes to bilateral chest/neck      Assessment:  This is a 44 y.o. female with cT2 cN0 Mx G3 ER+ (Strong, 100%) IA+ (Strong, 95%) Her2 Positive (2+ IHC, Amplified on FISH), clinical anatomic and prognostic stage IA invasive ductal carcinoma of the LEFT breast with DCIS (ER/IA+ G2, solid and cribriform types with comedonecrosis).     She is planning on undergoing neoadjuvant targeted chemotherapy with Taxotere carboplatinum, Herceptin and Perjeta q. 21 days for 6 cycles neoadjuvant treatment. She presents for port placement today.    Plan:  -Proceed with Port-A-Cath placement as above  - Discussed the risks (pain, bleeding, infection, damage to surrounding structures, pneumothorax, need for further procedures), benefits, alternatives to the procedure; all questions answered and " patient agrees to proceed.  Consent signed  - Patient marked with my initials  - Check status x-ray postoperatively  - Plan for discharge to home postoperatively.    Class 3 Severe Obesity (BMI >=40). Obesity-related health conditions include the following: none. Obesity is unchanged. BMI is  above average . We discussed  not applicable, preoperative evaluation .         Signed:    Gonzalez Vanessa MD  Breast Surgical Oncology  [facility]

## 2024-11-26 ENCOUNTER — TELEPHONE (OUTPATIENT)
Dept: ONCOLOGY | Facility: CLINIC | Age: 44
End: 2024-11-26
Payer: COMMERCIAL

## 2024-11-26 ENCOUNTER — PATIENT OUTREACH (OUTPATIENT)
Dept: ONCOLOGY | Facility: CLINIC | Age: 44
End: 2024-11-26
Payer: COMMERCIAL

## 2024-11-26 ENCOUNTER — TELEPHONE (OUTPATIENT)
Age: 44
End: 2024-11-26
Payer: COMMERCIAL

## 2024-11-26 NOTE — TELEPHONE ENCOUNTER
PO FU Call- spoke with pt. Knows to call with any questions or concerns.  Tomás 1 mo fu per Dr. Vanessa. Will fu then.

## 2024-11-26 NOTE — PROGRESS NOTES
I called and spoke with the patient today to see how she was doing after her port placement.  I also let her know that Dr. Saldivar would like to proceed with chemotherapy prior to the PET scan.  I let her know that Dr. Saldivar didn't want to delay treatment due to the treatment only slightly change if there were to be anything found on the PET scan. The patient voiced understanding.

## 2024-11-27 ENCOUNTER — NUTRITION (OUTPATIENT)
Dept: ONCOLOGY | Facility: CLINIC | Age: 44
End: 2024-11-27
Payer: COMMERCIAL

## 2024-11-27 ENCOUNTER — PATIENT OUTREACH (OUTPATIENT)
Dept: ONCOLOGY | Facility: CLINIC | Age: 44
End: 2024-11-27
Payer: COMMERCIAL

## 2024-11-27 ENCOUNTER — OFFICE VISIT (OUTPATIENT)
Dept: PHARMACY | Facility: HOSPITAL | Age: 44
End: 2024-11-27
Payer: COMMERCIAL

## 2024-11-27 ENCOUNTER — LAB (OUTPATIENT)
Dept: LAB | Facility: HOSPITAL | Age: 44
End: 2024-11-27
Payer: COMMERCIAL

## 2024-11-27 ENCOUNTER — CLINICAL SUPPORT (OUTPATIENT)
Dept: ONCOLOGY | Facility: CLINIC | Age: 44
End: 2024-11-27
Payer: COMMERCIAL

## 2024-11-27 DIAGNOSIS — Z17.0 BREAST CANCER, STAGE 1, ESTROGEN RECEPTOR POSITIVE, LEFT: ICD-10-CM

## 2024-11-27 DIAGNOSIS — C50.912 BREAST CANCER, STAGE 1, ESTROGEN RECEPTOR POSITIVE, LEFT: ICD-10-CM

## 2024-11-27 DIAGNOSIS — C50.912 BREAST CANCER, STAGE 1, ESTROGEN RECEPTOR POSITIVE, LEFT: Primary | ICD-10-CM

## 2024-11-27 DIAGNOSIS — Z17.0 BREAST CANCER, STAGE 1, ESTROGEN RECEPTOR POSITIVE, LEFT: Primary | ICD-10-CM

## 2024-11-27 DIAGNOSIS — C50.912 BREAST CANCER, STAGE 2, LEFT: ICD-10-CM

## 2024-11-27 DIAGNOSIS — C50.912 BREAST CANCER, STAGE 2, LEFT: Primary | ICD-10-CM

## 2024-11-27 LAB
ALBUMIN SERPL-MCNC: 3.7 G/DL (ref 3.5–5.2)
ALBUMIN/GLOB SERPL: 1.3 G/DL
ALP SERPL-CCNC: 60 U/L (ref 39–117)
ALT SERPL W P-5'-P-CCNC: 27 U/L (ref 1–33)
ANION GAP SERPL CALCULATED.3IONS-SCNC: 8.5 MMOL/L (ref 5–15)
AST SERPL-CCNC: 25 U/L (ref 1–32)
BASOPHILS # BLD AUTO: 0.04 10*3/MM3 (ref 0–0.2)
BASOPHILS NFR BLD AUTO: 0.3 % (ref 0–1.5)
BILIRUB CONJ SERPL-MCNC: <0.1 MG/DL (ref 0–0.3)
BILIRUB SERPL-MCNC: <0.2 MG/DL (ref 0–1.2)
BUN SERPL-MCNC: 13 MG/DL (ref 6–20)
BUN/CREAT SERPL: 15.5 (ref 7–25)
CALCIUM SPEC-SCNC: 8.7 MG/DL (ref 8.6–10.5)
CHLORIDE SERPL-SCNC: 103 MMOL/L (ref 98–107)
CO2 SERPL-SCNC: 25.5 MMOL/L (ref 22–29)
CREAT SERPL-MCNC: 0.84 MG/DL (ref 0.57–1)
DEPRECATED RDW RBC AUTO: 51.9 FL (ref 37–54)
EGFRCR SERPLBLD CKD-EPI 2021: 89.1 ML/MIN/1.73
EOSINOPHIL # BLD AUTO: 0.15 10*3/MM3 (ref 0–0.4)
EOSINOPHIL NFR BLD AUTO: 1 % (ref 0.3–6.2)
ERYTHROCYTE [DISTWIDTH] IN BLOOD BY AUTOMATED COUNT: 14.6 % (ref 12.3–15.4)
GLOBULIN UR ELPH-MCNC: 2.8 GM/DL
GLUCOSE SERPL-MCNC: 81 MG/DL (ref 65–99)
HCT VFR BLD AUTO: 38.9 % (ref 34–46.6)
HGB BLD-MCNC: 12.5 G/DL (ref 12–15.9)
LYMPHOCYTES # BLD AUTO: 6.22 10*3/MM3 (ref 0.7–3.1)
LYMPHOCYTES NFR BLD AUTO: 40.1 % (ref 19.6–45.3)
MCH RBC QN AUTO: 31.9 PG (ref 26.6–33)
MCHC RBC AUTO-ENTMCNC: 32.1 G/DL (ref 31.5–35.7)
MCV RBC AUTO: 99.2 FL (ref 79–97)
MONOCYTES # BLD AUTO: 0.77 10*3/MM3 (ref 0.1–0.9)
MONOCYTES NFR BLD AUTO: 5 % (ref 5–12)
NEUTROPHILS NFR BLD AUTO: 53.6 % (ref 42.7–76)
NEUTROPHILS NFR BLD AUTO: 8.33 10*3/MM3 (ref 1.7–7)
PLATELET # BLD AUTO: 376 10*3/MM3 (ref 140–450)
PMV BLD AUTO: 10.3 FL (ref 6–12)
POTASSIUM SERPL-SCNC: 4 MMOL/L (ref 3.5–5.2)
PROT SERPL-MCNC: 6.5 G/DL (ref 6–8.5)
RBC # BLD AUTO: 3.92 10*6/MM3 (ref 3.77–5.28)
SODIUM SERPL-SCNC: 137 MMOL/L (ref 136–145)
WBC NRBC COR # BLD AUTO: 15.51 10*3/MM3 (ref 3.4–10.8)

## 2024-11-27 PROCEDURE — 36415 COLL VENOUS BLD VENIPUNCTURE: CPT

## 2024-11-27 PROCEDURE — 80053 COMPREHEN METABOLIC PANEL: CPT | Performed by: INTERNAL MEDICINE

## 2024-11-27 PROCEDURE — 82248 BILIRUBIN DIRECT: CPT | Performed by: INTERNAL MEDICINE

## 2024-11-27 PROCEDURE — 85025 COMPLETE CBC W/AUTO DIFF WBC: CPT

## 2024-11-27 RX ORDER — OLANZAPINE 5 MG/1
5 TABLET ORAL NIGHTLY
Qty: 4 TABLET | Refills: 5 | Status: SHIPPED | OUTPATIENT
Start: 2024-12-01

## 2024-11-27 RX ORDER — DEXAMETHASONE 4 MG/1
TABLET ORAL
Qty: 12 TABLET | Refills: 5 | Status: SHIPPED | OUTPATIENT
Start: 2024-12-01 | End: 2024-11-27 | Stop reason: SDUPTHER

## 2024-11-27 RX ORDER — ONDANSETRON 8 MG/1
8 TABLET, FILM COATED ORAL 3 TIMES DAILY PRN
Qty: 30 TABLET | Refills: 5 | Status: SHIPPED | OUTPATIENT
Start: 2024-12-01

## 2024-11-27 RX ORDER — ONDANSETRON 8 MG/1
8 TABLET, FILM COATED ORAL 3 TIMES DAILY PRN
Qty: 30 TABLET | Refills: 5 | Status: SHIPPED | OUTPATIENT
Start: 2024-12-01 | End: 2024-11-27 | Stop reason: SDUPTHER

## 2024-11-27 RX ORDER — OLANZAPINE 5 MG/1
5 TABLET ORAL NIGHTLY
Qty: 4 TABLET | Refills: 5 | Status: SHIPPED | OUTPATIENT
Start: 2024-12-01 | End: 2024-11-27 | Stop reason: SDUPTHER

## 2024-11-27 RX ORDER — DEXAMETHASONE 4 MG/1
TABLET ORAL
Qty: 12 TABLET | Refills: 5 | Status: SHIPPED | OUTPATIENT
Start: 2024-12-01

## 2024-11-27 NOTE — PROGRESS NOTES
Pharmacy Patient Education Note          Mary Ann Fletcher is a 44 y.o. female being seen at [facility] by Dr. Saldivar for a diagnosis of Breast Cancer with a plan to begin treatment with Docetaxel, Carboplatin, Trastuzumab and Pertuzumab. Pharmacist reviewed regimen, as detailed below, with patient.     The discussion included the dose, route, and frequency of administration. Most common side and clinically significant effects were discussed including neutropenia, thrombocytopenia, fatigue, nausea/vomiting, taste changes, loss of appetite, ototoxicity, hair loss/thinning, neuropathy, nephropathy, mucositis, flu-like symptoms, pneumonitis, and electrolyte changes.     Precautions reviewed include:   Infection prevention precautions were reviewed including hand washing, food safety and the avoidance of crowds/use of mask.   Bleeding precautions including the use of soft bristle toothbrush, electric razor and precautions against falls were discussed. Importance of appropriate hygiene and self-care for nausea, anorexia, and mucositis reviewed.  Anti-emetic prevention and treatment with palonosetron, dexamethasone,  ondansetron & olanzapine    Patient was counseled on when to notify a provider including in the event of the following:  Signs and symptoms of infection, including temperature >100.4  Signs and symptoms of serious bleeding including blood in the urine or stool  Changes in urinary output  Frequent nausea/vomiting or diarrhea or severe abdominal pain  Development of mouth sores or ulcerations  The patient was provided with general information on when to call the office for adverse events.     Patient provided with handout regarding medications, and reviewed general plan for chemotherapy administration. Patient engaged in counseling throughout. Allowed time for patient to ask questions. Patient without any further questions at this time and verbalized understanding.    Tatiana Beasley RPH  14:56  EST

## 2024-11-27 NOTE — PROGRESS NOTES
"                 Nutrition Assessment  Mary Ann Fletcher    Height: 5'3\"  Weight: 236#  BMI: 41.8  Weight Hx:   Wt Readings from Last 20 Encounters:   11/25/24 107 kg (236 lb)   11/21/24 106 kg (234 lb)   11/19/24 107 kg (235 lb)   11/15/24 107 kg (235 lb)   11/13/24 105 kg (232 lb)   11/07/24 103 kg (228 lb)   09/30/24 104 kg (228 lb 4.8 oz)   09/19/24 103 kg (227 lb 1.6 oz)   09/11/24 104 kg (228 lb 9.6 oz)     IBW: 115# (205.2%)  UBW: 220# - 225# (104.8% - 107.2%)    Nutrition Questionnaire    Food Allergies: Pt denied allergies at this time    Chewing Problems: Pt denied chewing problems at this time.     Swallowing Problems: Pt denied problems swallowing at this time.    Who does the cooking & shopping: Pt and her sister, Stacy, share the responsibility.    Nausea/Vomiting/Diarrhea: Pt denies n/v/d/c    Appetite: (poor) 1 2 3 4 5 6 7 8 9 10 (excellent): 5    24-Hour Diet Recall:  Breakfast - dry cinnamon toast crunch, water, diet coke  Lunch - [employer] roast beef s/w, mozzarella sticks x4, some curly fries, diet coke  Dinner - nothing  Snacks - two cookies, twizzlers  Water ~ 64 oz/day    Discussion: Met the pt with her sister, Stacy, to provide new-pt diet education. We reviewed possible side effects of her treatment and how it may impact her ability to eat and tolerate food. We discussed the importance of weight maintenance, consuming adequate calories and protein, even when appetite is low, taste changes occur, and energy is low, pt verbalized understanding. We reviewed ways to manage side effects with diet, pt verbalized understanding.    Enterade protocol reviewed. The pt is to have two packets/day two days before treatment begins, and two packets/day starting the day of treatment until the box is empty, pt verbalized understanding. Three box of ten packets were given to the pt.    All questions and concerns were addressed and answered. I provided my contact information and encouraged them to call or schedule " an appointment as needed.    Nutrition-Related Side Effects:    []Abdominal Pain  []Constipation  [x]Diarrhea  []Electrolyte Imbalance  []Fatigue  []HTN  []Hypertriglyceridemia  []Hyponatremia  []Loss of Appetite  []Low Blood Pressure  [x]Metallic Taste  []Mouth Sores  [x]Nausea  [x]Neutropenia  []Peripheral Neuropathy  []Proteinuria  []Shortness of Breath  []Taste Changes  [x]Vomiting  []Weakness  [x]Weight Loss  []Other    Colin Mcnair, MS,RD,LD-[place],CD-[place]  Registered Dietitian

## 2024-11-27 NOTE — PROGRESS NOTES
The patient's case was discussed at Breast Conference and there was an order needed for the patient referral to the genetic counselor.

## 2024-12-02 ENCOUNTER — HOSPITAL ENCOUNTER (OUTPATIENT)
Dept: ONCOLOGY | Facility: HOSPITAL | Age: 44
Discharge: HOME | End: 2024-12-02
Admitting: NURSE PRACTITIONER
Payer: COMMERCIAL

## 2024-12-02 VITALS
BODY MASS INDEX: 42.97 KG/M2 | OXYGEN SATURATION: 96 % | SYSTOLIC BLOOD PRESSURE: 143 MMHG | DIASTOLIC BLOOD PRESSURE: 88 MMHG | TEMPERATURE: 98.1 F | HEART RATE: 79 BPM | WEIGHT: 242.5 LBS | RESPIRATION RATE: 16 BRPM | HEIGHT: 63 IN

## 2024-12-02 DIAGNOSIS — C50.912 BREAST CANCER, STAGE 1, ESTROGEN RECEPTOR POSITIVE, LEFT: Primary | ICD-10-CM

## 2024-12-02 DIAGNOSIS — Z17.0 BREAST CANCER, STAGE 1, ESTROGEN RECEPTOR POSITIVE, LEFT: Primary | ICD-10-CM

## 2024-12-02 LAB
ALP BLD-CCNC: 61 U/L (ref 42–141)
B-HCG UR QL: NEGATIVE
BUN BLDA-MCNC: 16 MG/DL (ref 7–22)
CALCIUM BLD QL: 10 MG/DL (ref 8–10.3)
CHLORIDE BLDA-SCNC: 108 MMOL/L (ref 98–108)
CO2 BLDA-SCNC: 26 MMOL/L (ref 18–33)
CREAT BLDA-MCNC: 1.1 MG/DL (ref 0.6–1.2)
EGFRCR SERPLBLD CKD-EPI 2021: 64.5 ML/MIN/1.73
GLUCOSE BLDC GLUCOMTR-MCNC: 128 MG/DL (ref 73–118)
POC ALBUMIN: 3.4 G/L (ref 3.3–5.5)
POC ALT (SGPT): 32 U/L (ref 10–47)
POC AST (SGOT): 27 U/L (ref 11–38)
POC TOTAL BILIRUBIN: 0.5 MG/DL (ref 0.2–1.6)
POC TOTAL PROTEIN: 7.7 G/DL (ref 6.4–8.1)
POTASSIUM BLDA-SCNC: 3.3 MMOL/L (ref 3.6–5.1)
SODIUM BLD-SCNC: 140 MMOL/L (ref 128–145)

## 2024-12-02 PROCEDURE — 25010000002 FOSAPREPITANT PER 1 MG: Performed by: INTERNAL MEDICINE

## 2024-12-02 PROCEDURE — 96367 TX/PROPH/DG ADDL SEQ IV INF: CPT

## 2024-12-02 PROCEDURE — 80053 COMPREHEN METABOLIC PANEL: CPT

## 2024-12-02 PROCEDURE — 25810000003 SODIUM CHLORIDE 0.9 % SOLUTION 250 ML FLEX CONT: Performed by: INTERNAL MEDICINE

## 2024-12-02 PROCEDURE — 96375 TX/PRO/DX INJ NEW DRUG ADDON: CPT

## 2024-12-02 PROCEDURE — 25010000002 DOCETAXEL 20 MG/ML SOLUTION 8 ML VIAL: Performed by: INTERNAL MEDICINE

## 2024-12-02 PROCEDURE — 25010000002 PERTUZUMAB 420 MG/14ML SOLUTION 420 MG VIAL: Performed by: INTERNAL MEDICINE

## 2024-12-02 PROCEDURE — 96417 CHEMO IV INFUS EACH ADDL SEQ: CPT

## 2024-12-02 PROCEDURE — 25010000002 HEPARIN LOCK FLUSH PER 10 UNITS: Performed by: INTERNAL MEDICINE

## 2024-12-02 PROCEDURE — 25010000002 CARBOPLATIN PER 50 MG: Performed by: INTERNAL MEDICINE

## 2024-12-02 PROCEDURE — 96413 CHEMO IV INFUSION 1 HR: CPT

## 2024-12-02 PROCEDURE — 25010000002 PALONOSETRON 0.25 MG/5ML SOLUTION PREFILLED SYRINGE: Performed by: INTERNAL MEDICINE

## 2024-12-02 PROCEDURE — 81025 URINE PREGNANCY TEST: CPT

## 2024-12-02 PROCEDURE — 25010000002 TRASTUZUMAB-ANNS 420 MG RECONSTITUTED SOLUTION 1 EACH VIAL: Performed by: INTERNAL MEDICINE

## 2024-12-02 PROCEDURE — 96415 CHEMO IV INFUSION ADDL HR: CPT

## 2024-12-02 RX ORDER — HYDROCORTISONE SODIUM SUCCINATE 100 MG/2ML
100 INJECTION INTRAMUSCULAR; INTRAVENOUS AS NEEDED
Status: CANCELLED | OUTPATIENT
Start: 2024-12-02

## 2024-12-02 RX ORDER — SODIUM CHLORIDE 9 MG/ML
20 INJECTION, SOLUTION INTRAVENOUS ONCE
Status: DISCONTINUED | OUTPATIENT
Start: 2024-12-02 | End: 2024-12-03 | Stop reason: HOSPADM

## 2024-12-02 RX ORDER — DIPHENHYDRAMINE HYDROCHLORIDE 50 MG/ML
50 INJECTION INTRAMUSCULAR; INTRAVENOUS AS NEEDED
Status: CANCELLED | OUTPATIENT
Start: 2024-12-02

## 2024-12-02 RX ORDER — SODIUM CHLORIDE 9 MG/ML
20 INJECTION, SOLUTION INTRAVENOUS ONCE
Status: CANCELLED | OUTPATIENT
Start: 2024-12-02

## 2024-12-02 RX ORDER — SODIUM CHLORIDE 0.9 % (FLUSH) 0.9 %
20 SYRINGE (ML) INJECTION AS NEEDED
OUTPATIENT
Start: 2024-12-02

## 2024-12-02 RX ORDER — POTASSIUM CHLORIDE 1500 MG/1
40 TABLET, EXTENDED RELEASE ORAL ONCE
Qty: 2 TABLET | Refills: 0 | Status: SHIPPED | OUTPATIENT
Start: 2024-12-02 | End: 2024-12-02

## 2024-12-02 RX ORDER — PALONOSETRON 0.05 MG/ML
0.25 INJECTION, SOLUTION INTRAVENOUS ONCE
Status: COMPLETED | OUTPATIENT
Start: 2024-12-02 | End: 2024-12-02

## 2024-12-02 RX ORDER — HEPARIN SODIUM (PORCINE) LOCK FLUSH IV SOLN 100 UNIT/ML 100 UNIT/ML
500 SOLUTION INTRAVENOUS AS NEEDED
OUTPATIENT
Start: 2024-12-02

## 2024-12-02 RX ORDER — HEPARIN SODIUM (PORCINE) LOCK FLUSH IV SOLN 100 UNIT/ML 100 UNIT/ML
500 SOLUTION INTRAVENOUS AS NEEDED
Status: DISCONTINUED | OUTPATIENT
Start: 2024-12-02 | End: 2024-12-03 | Stop reason: HOSPADM

## 2024-12-02 RX ORDER — FAMOTIDINE 10 MG/ML
20 INJECTION, SOLUTION INTRAVENOUS AS NEEDED
Status: CANCELLED | OUTPATIENT
Start: 2024-12-02

## 2024-12-02 RX ORDER — SODIUM CHLORIDE 0.9 % (FLUSH) 0.9 %
20 SYRINGE (ML) INJECTION AS NEEDED
Status: DISCONTINUED | OUTPATIENT
Start: 2024-12-02 | End: 2024-12-03 | Stop reason: HOSPADM

## 2024-12-02 RX ADMIN — CARBOPLATIN 840 MG: 10 INJECTION, SOLUTION INTRAVENOUS at 14:57

## 2024-12-02 RX ADMIN — TRASTUZUMAB-ANNS 860 MG: 420 INJECTION, POWDER, LYOPHILIZED, FOR SOLUTION INTRAVENOUS at 12:05

## 2024-12-02 RX ADMIN — HEPARIN 500 UNITS: 100 SYRINGE at 15:33

## 2024-12-02 RX ADMIN — FOSAPREPITANT 100 ML: 150 INJECTION, POWDER, LYOPHILIZED, FOR SOLUTION INTRAVENOUS at 09:11

## 2024-12-02 RX ADMIN — DOCETAXEL 155 MG: 20 INJECTION, SOLUTION, CONCENTRATE INTRAVENOUS at 13:49

## 2024-12-02 RX ADMIN — PERTUZUMAB 840 MG: 30 INJECTION, SOLUTION, CONCENTRATE INTRAVENOUS at 09:58

## 2024-12-02 RX ADMIN — PALONOSETRON 0.25 MG: 0.25 INJECTION, SOLUTION INTRAVENOUS at 09:10

## 2024-12-02 RX ADMIN — Medication 20 ML: at 15:32

## 2024-12-02 NOTE — PROGRESS NOTES
Pt seen in office today for C1D1 pf Perjeta, Kanjinti, Taxotere and Carboplatin. [facility] infusaport accessed. 10cc wasted. Cbc drawn and resulted on 11/27/24. Cmp drawn and resulted today. Questions answered. Consent signed on 11/28/24. Treatment given as ordered.

## 2024-12-03 ENCOUNTER — HOSPITAL ENCOUNTER (OUTPATIENT)
Dept: ONCOLOGY | Facility: HOSPITAL | Age: 44
Discharge: HOME | End: 2024-12-03
Admitting: INTERNAL MEDICINE
Payer: COMMERCIAL

## 2024-12-03 DIAGNOSIS — Z17.0 BREAST CANCER, STAGE 1, ESTROGEN RECEPTOR POSITIVE, LEFT: Primary | ICD-10-CM

## 2024-12-03 DIAGNOSIS — C50.912 BREAST CANCER, STAGE 1, ESTROGEN RECEPTOR POSITIVE, LEFT: Primary | ICD-10-CM

## 2024-12-03 PROCEDURE — 96372 THER/PROPH/DIAG INJ SC/IM: CPT

## 2024-12-03 PROCEDURE — 25010000002 PEGFILGRASTIM-JMDB 6 MG/0.6ML SOLUTION PREFILLED SYRINGE: Performed by: INTERNAL MEDICINE

## 2024-12-03 RX ADMIN — PEGFILGRASTIM-JMDB 6 MG: 6 INJECTION SUBCUTANEOUS at 11:55

## 2024-12-05 ENCOUNTER — HOSPITAL ENCOUNTER (OUTPATIENT)
Dept: PET IMAGING | Facility: HOSPITAL | Age: 44
Discharge: HOME | End: 2024-12-05
Payer: COMMERCIAL

## 2024-12-05 DIAGNOSIS — C50.912 BREAST CANCER, STAGE 2, LEFT: ICD-10-CM

## 2024-12-05 LAB — GLUCOSE BLDC GLUCOMTR-MCNC: 89 MG/DL (ref 70–105)

## 2024-12-05 PROCEDURE — 82948 REAGENT STRIP/BLOOD GLUCOSE: CPT

## 2024-12-05 PROCEDURE — A9552 F18 FDG: HCPCS | Performed by: INTERNAL MEDICINE

## 2024-12-05 PROCEDURE — 34310000005 FLUDEOXYGLUCOSE F18 SOLUTION: Performed by: INTERNAL MEDICINE

## 2024-12-05 PROCEDURE — 78815 PET IMAGE W/CT SKULL-THIGH: CPT

## 2024-12-05 RX ADMIN — FLUDEOXYGLUCOSE F 18 1 DOSE: 200 INJECTION, SOLUTION INTRAVENOUS at 11:27

## 2024-12-06 ENCOUNTER — TELEPHONE (OUTPATIENT)
Dept: GENETICS | Facility: HOSPITAL | Age: 44
End: 2024-12-06
Payer: COMMERCIAL

## 2024-12-09 ENCOUNTER — CLINICAL SUPPORT (OUTPATIENT)
Dept: GENETICS | Facility: HOSPITAL | Age: 44
End: 2024-12-09
Payer: COMMERCIAL

## 2024-12-09 DIAGNOSIS — Z17.0 BREAST CANCER, STAGE 1, ESTROGEN RECEPTOR POSITIVE, LEFT: ICD-10-CM

## 2024-12-09 DIAGNOSIS — Z80.3 FAMILY HISTORY OF BREAST CANCER: ICD-10-CM

## 2024-12-09 DIAGNOSIS — Z13.79 GENETIC TESTING: Primary | ICD-10-CM

## 2024-12-09 DIAGNOSIS — C50.912 BREAST CANCER, STAGE 1, ESTROGEN RECEPTOR POSITIVE, LEFT: ICD-10-CM

## 2024-12-09 NOTE — PROGRESS NOTES
Mary Ann Fletcher, a 44-year-old female, was seen for genetic counseling due to a personal history of cancer. Genetic counseling was provided via telephone. Ms. Fletcher confirmed her name, date of birth, and that she was in [place] at the time of the appointment. She was recently diagnosed with left-sided breast cancer at age 44. She is planning on a left-sided mastectomy and is currently undergoing chemotherapy. Ms. Fletcher is pre-menopausal and retains her uterus and ovaries. She has not yet had a colonoscopy. She was interested in discussing her risk for a hereditary cancer syndrome. Ms. Fletcher was interested in pursuing a multi-gene panel to evaluate her risk of cancer, therefore the BRCAplus panel was ordered through [facility] which analyzes BRCA1/2 and 11 additional genes associated with an increased cancer risk. Results are expected in 7-10 days.    PERTINENT FAMILY HISTORY: (See attached pedigree)   Mat Grandmother: Esophageal cancer  Mat Great Aunt: Breast cancer, 50s     Ms. Fletcher has no information regarding her paternal family history. Records regarding the family history were not available for review.     RISK ASSESSMENT:   Ms. Fletcher's personal history of cancer led to concern for a hereditary cancer syndrome. NCCN guidelines for genetic testing for BRCA1/2 states that individuals with a breast cancer diagnosis under the age of 50 may consider genetic testing. Based on Ms. Fletcher's diagnosis at age 44, she would meet this criteria. We discussed BRCA1/2 testing as well as the option of pursuing a panel that would test for other genes known to impact breast cancer risk in addition to BRCA1/2. These risk assessments are based on the family history information provided at the time of the appointment and could change in the future should new information be obtained.    GENETIC COUNSELING: We reviewed the family history information in detail. Cases of cancer follow three general patterns: sporadic, familial, and  hereditary. While most cancer is sporadic, some cases appear to occur in family clusters. These cases are said to be familial and account for 10-20% of cancer cases. Familial cases may be due to a combination of shared genes and environmental factors among family members. In even fewer families, the cancer is said to be inherited, and the genes responsible for the cancer are known.      Family histories typical of hereditary cancer syndromes usually include multiple first- and second-degree relatives diagnosed with cancer types that define a syndrome. These cases tend to be diagnosed at younger-than-expected ages and can be bilateral or multifocal. The cancer in these families follows an autosomal dominant inheritance pattern, which indicates the likely presence of a mutation in a cancer susceptibility gene. Children and siblings of an individual believed to carry this mutation have a 50% chance of inheriting that mutation, thereby inheriting the increased risk to develop cancer. These mutations can be passed down from the maternal or the paternal lineage.    Hereditary breast cancer accounts for 5-10% of all cases of breast cancer. A significant proportion of hereditary breast and ovarian cancer can be attributed to mutations in the BRCA1 and BRCA2 genes. Mutations in these genes confer an increased risk for breast cancer, ovarian cancer, male breast cancer, prostate cancer, and pancreatic cancer. Women with a BRCA1 or BRCA2 mutation who have already been diagnosed with breast cancer have a 25-40% lifetime risk of a second breast cancer. Women with a BRCA1 or BRCA2 mutation have up to a 58% risk of ovarian cancer.     There are other genes that are known to be associated with an increased risk for breast cancer. Some of these genes have well defined cancer risks and established management guidelines. Other genes that can be tested for have been more recently described, and there may be less data regarding the risks  and therefore may not have established management guidelines. We reviewed that in some cases, the identification of a genetic mutation may impact treatment options for some types of cancer. We discussed these limitations at length. Based on Ms. Fletcher's desire to get as much information as possible regarding her personal risks, she opted to pursue testing through a panel that would look at several other genes known to increase the risk for breast cancer.    GENETIC TESTING:  The risks, benefits and limitations of genetic testing and implications for clinical management following testing were reviewed. DNA test results can influence decisions regarding screening, prevention and surgical management. Genetic testing can have significant psychological implications for both individuals and families. Also discussed was the possibility of employment and insurance discrimination based on genetic test results and the laws in place to prevent this ([place]).    We discussed panel testing, which would involve testing for BRCA1/2 as well as 11 additional genes that are associated with increased cancer risk. The benefits and limitations of genetic testing were discussed, and Ms. Fletcher decided to pursue testing via the panel. The implications of a positive or negative test result were discussed. We discussed the possibility that, in some cases, genetic test results may be informative or may be ambiguous due to the identification of a genetic variant. These variants may or may not be associated with an increased cancer risk. With multigene panel testing, it is not uncommon for a variant of uncertain significance (VUS) to be identified. If a VUS is identified, testing family members is typically not recommended and screening recommendations are made based on the family history. The laboratories that perform genetic testing work to reclassify the VUS and send out an amended report if and when a VUS is reclassified. The majority of  variant findings are ultimately reclassified to a negative result. Given her personal and family history, a negative test result would not eliminate all cancer risk to her relatives, although the risk would not be as high as it would with positive genetic testing.      PLAN: Genetic testing via the BRCAplus panel through [facility] was ordered. She plans to have her blood drawn on 12/10 at [facility]. Results are expected in 7-10 days once the sample has been received. Ms. Fletcher is welcome to contact us in the meantime with any questions she may have at [phone].      Margarita Rothman MS, [facility], [facility]  Licensed Certified Genetic Counselor

## 2024-12-10 ENCOUNTER — OFFICE VISIT (OUTPATIENT)
Dept: ONCOLOGY | Facility: CLINIC | Age: 44
End: 2024-12-10
Payer: COMMERCIAL

## 2024-12-10 ENCOUNTER — LAB (OUTPATIENT)
Dept: LAB | Facility: HOSPITAL | Age: 44
End: 2024-12-10
Payer: COMMERCIAL

## 2024-12-10 ENCOUNTER — HOSPITAL ENCOUNTER (OUTPATIENT)
Dept: ONCOLOGY | Facility: HOSPITAL | Age: 44
Discharge: HOME | End: 2024-12-10
Payer: COMMERCIAL

## 2024-12-10 VITALS
TEMPERATURE: 97.9 F | HEART RATE: 92 BPM | DIASTOLIC BLOOD PRESSURE: 78 MMHG | BODY MASS INDEX: 42.17 KG/M2 | RESPIRATION RATE: 18 BRPM | HEIGHT: 63 IN | WEIGHT: 238 LBS | OXYGEN SATURATION: 95 % | SYSTOLIC BLOOD PRESSURE: 124 MMHG

## 2024-12-10 DIAGNOSIS — Z17.0 BREAST CANCER, STAGE 1, ESTROGEN RECEPTOR POSITIVE, LEFT: Primary | ICD-10-CM

## 2024-12-10 DIAGNOSIS — C50.912 BREAST CANCER, STAGE 2, LEFT: Primary | ICD-10-CM

## 2024-12-10 DIAGNOSIS — C50.912 BREAST CANCER, STAGE 1, ESTROGEN RECEPTOR POSITIVE, LEFT: Primary | ICD-10-CM

## 2024-12-10 DIAGNOSIS — C50.912 BREAST CANCER, STAGE 2, LEFT: ICD-10-CM

## 2024-12-10 PROBLEM — R42 DIZZINESS: Status: ACTIVE | Noted: 2024-12-10

## 2024-12-10 LAB
BASOPHILS # BLD AUTO: 0.03 10*3/MM3 (ref 0–0.2)
BASOPHILS NFR BLD AUTO: 0.2 % (ref 0–1.5)
DEPRECATED RDW RBC AUTO: 50.9 FL (ref 37–54)
EOSINOPHIL # BLD AUTO: 0.01 10*3/MM3 (ref 0–0.4)
EOSINOPHIL NFR BLD AUTO: 0.1 % (ref 0.3–6.2)
ERYTHROCYTE [DISTWIDTH] IN BLOOD BY AUTOMATED COUNT: 14.4 % (ref 12.3–15.4)
HCT VFR BLD AUTO: 42.7 % (ref 34–46.6)
HGB BLD-MCNC: 13.6 G/DL (ref 12–15.9)
HOLD SPECIMEN: NORMAL
HOLD SPECIMEN: NORMAL
LYMPHOCYTES # BLD AUTO: 3.95 10*3/MM3 (ref 0.7–3.1)
LYMPHOCYTES NFR BLD AUTO: 21.1 % (ref 19.6–45.3)
MCH RBC QN AUTO: 31.6 PG (ref 26.6–33)
MCHC RBC AUTO-ENTMCNC: 31.9 G/DL (ref 31.5–35.7)
MCV RBC AUTO: 99.3 FL (ref 79–97)
MONOCYTES # BLD AUTO: 1.28 10*3/MM3 (ref 0.1–0.9)
MONOCYTES NFR BLD AUTO: 6.8 % (ref 5–12)
NEUTROPHILS NFR BLD AUTO: 13.48 10*3/MM3 (ref 1.7–7)
NEUTROPHILS NFR BLD AUTO: 71.8 % (ref 42.7–76)
PLATELET # BLD AUTO: 318 10*3/MM3 (ref 140–450)
PMV BLD AUTO: 10.3 FL (ref 6–12)
RBC # BLD AUTO: 4.3 10*6/MM3 (ref 3.77–5.28)
WBC NRBC COR # BLD AUTO: 18.75 10*3/MM3 (ref 3.4–10.8)

## 2024-12-10 PROCEDURE — 36415 COLL VENOUS BLD VENIPUNCTURE: CPT

## 2024-12-10 PROCEDURE — G0463 HOSPITAL OUTPT CLINIC VISIT: HCPCS

## 2024-12-10 PROCEDURE — 99215 OFFICE O/P EST HI 40 MIN: CPT | Performed by: INTERNAL MEDICINE

## 2024-12-10 PROCEDURE — 85025 COMPLETE CBC W/AUTO DIFF WBC: CPT

## 2024-12-10 RX ORDER — POTASSIUM CHLORIDE 1500 MG/1
TABLET, EXTENDED RELEASE ORAL
COMMUNITY
Start: 2024-12-02

## 2024-12-10 RX ORDER — DIPHENOXYLATE HYDROCHLORIDE AND ATROPINE SULFATE 2.5; .025 MG/1; MG/1
1 TABLET ORAL 3 TIMES DAILY
Qty: 40 TABLET | Refills: 1 | Status: SHIPPED | OUTPATIENT
Start: 2024-12-10
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
## 2024-12-10 NOTE — PROGRESS NOTES
Pt added to infusion schedule for IV fluids for light headedness.  Upon evaluation patient is able to eat and drink without complaint, BP WDL, and is having slight light headedness.  She had a couple episodes of diarrhea Sunday and Monday but was still able to eat and drink. Dr. Saldivar sent in a prescription for Lomotil to help with the diarrhea.   I explained to the patient the national fluid shortage and offered biolyte in arelis of PIV fluids.  Patient is fine with trying the Biolyte and will re-assess afterwards.  Patient finished the biolyte.  Upon re-assessing the patient, she is feeling much better.  She stood in the room and walked around for a minute with out any dizziness. I explained that we can give the IV fluids if she felt that she still needed them.  She states that she is much better and will not need them today.  I advised her to start the Lomitol as ordered with the first loose stool.  Pt v/u and in agreement with this plan. I advised the patient to call the office if she feels that she needs the fluids.

## 2024-12-12 DIAGNOSIS — F41.9 ANXIETY AND DEPRESSION: ICD-10-CM

## 2024-12-12 DIAGNOSIS — F32.A ANXIETY AND DEPRESSION: ICD-10-CM

## 2024-12-12 RX ORDER — PROPRANOLOL HYDROCHLORIDE 10 MG/1
10 TABLET ORAL 3 TIMES DAILY PRN
Qty: 90 TABLET | Refills: 0 | Status: SHIPPED | OUTPATIENT
Start: 2024-12-12

## 2024-12-18 ENCOUNTER — TELEPHONE (OUTPATIENT)
Dept: GENETICS | Facility: HOSPITAL | Age: 44
End: 2024-12-18
Payer: COMMERCIAL

## 2024-12-18 NOTE — TELEPHONE ENCOUNTER
Attempted to contact Ms. Fletcher regarding genetic test results. She did not answer. Left VM asking for her to return my call.

## 2024-12-19 ENCOUNTER — TELEPHONE (OUTPATIENT)
Dept: GENETICS | Facility: HOSPITAL | Age: 44
End: 2024-12-19
Payer: COMMERCIAL

## 2024-12-19 ENCOUNTER — DOCUMENTATION (OUTPATIENT)
Dept: GENETICS | Facility: HOSPITAL | Age: 44
End: 2024-12-19
Payer: COMMERCIAL

## 2024-12-19 NOTE — PROGRESS NOTES
Mary Ann Fletcher, a 44-year-old female, was seen for genetic counseling due to a personal history of cancer. Genetic counseling was provided via telephone. Ms. Fletcher confirmed her name, date of birth, and that she was in [place] at the time of the appointment. She was recently diagnosed with left-sided breast cancer at age 44. She is planning on a left-sided mastectomy and is currently undergoing chemotherapy. Ms. Fletcher is pre-menopausal and retains her uterus and ovaries. She has not yet had a colonoscopy. She was interested in discussing her risk for a hereditary cancer syndrome. Ms. Fletcher was interested in pursuing a multi-gene panel to evaluate her risk of cancer, therefore the BRCAplus panel was ordered through [facility] [facility] which analyzes BRCA1/2 and 11 additional genes associated with an increased cancer risk. Genetic testing was negative for any mutations in the BRCA1/2 genes and 11 additional genes included on this panel (see attached results). These normal results were discussed with Ms. Fletcher by telephone on 12/19/24.    PERTINENT FAMILY HISTORY: (See attached pedigree)   Mat Grandmother:  Esophageal cancer  Mat Great Aunt:  Breast cancer, 50s     Ms. Fletcher has no information regarding her paternal family history. Records regarding the family history were not available for review.     RISK ASSESSMENT:   Ms. Fletcher's personal history of cancer led to concern for a hereditary cancer syndrome. NCCN guidelines for genetic testing for BRCA1/2 states that individuals with a breast cancer diagnosis under the age of 50 may consider genetic testing. Based on Ms. Fletcher's diagnosis at age 44, she would meet this criteria. We discussed BRCA1/2 testing as well as the option of pursuing a panel that would test for other genes known to impact breast cancer risk in addition to BRCA1/2. These risk assessments are based on the family history information provided at the time of the appointment and could change in the future  should new information be obtained.    GENETIC COUNSELING: We reviewed the family history information in detail. Cases of cancer follow three general patterns: sporadic, familial, and hereditary. While most cancer is sporadic, some cases appear to occur in family clusters. These cases are said to be familial and account for 10-20% of cancer cases. Familial cases may be due to a combination of shared genes and environmental factors among family members. In even fewer families, the cancer is said to be inherited, and the genes responsible for the cancer are known.      Family histories typical of hereditary cancer syndromes usually include multiple first- and second-degree relatives diagnosed with cancer types that define a syndrome. These cases tend to be diagnosed at younger-than-expected ages and can be bilateral or multifocal. The cancer in these families follows an autosomal dominant inheritance pattern, which indicates the likely presence of a mutation in a cancer susceptibility gene. Children and siblings of an individual believed to carry this mutation have a 50% chance of inheriting that mutation, thereby inheriting the increased risk to develop cancer. These mutations can be passed down from the maternal or the paternal lineage.    Hereditary breast cancer accounts for 5-10% of all cases of breast cancer. A significant proportion of hereditary breast and ovarian cancer can be attributed to mutations in the BRCA1 and BRCA2 genes. Mutations in these genes confer an increased risk for breast cancer, ovarian cancer, male breast cancer, prostate cancer, and pancreatic cancer. Women with a BRCA1 or BRCA2 mutation who have already been diagnosed with breast cancer have a 25-40% lifetime risk of a second breast cancer. Women with a BRCA1 or BRCA2 mutation have up to a 58% risk of ovarian cancer.     There are other genes that are known to be associated with an increased risk for breast cancer. Some of these genes  have well defined cancer risks and established management guidelines. Other genes that can be tested for have been more recently described, and there may be less data regarding the risks and therefore may not have established management guidelines. We reviewed that in some cases, the identification of a genetic mutation may impact treatment options for some types of cancer. We discussed these limitations at length. Based on Ms. Fletcher's desire to get as much information as possible regarding her personal risks, she opted to pursue testing through a panel that would look at several other genes known to increase the risk for breast cancer.    GENETIC TESTING:  The risks, benefits and limitations of genetic testing and implications for clinical management following testing were reviewed. DNA test results can influence decisions regarding screening, prevention and surgical management. Genetic testing can have significant psychological implications for both individuals and families. Also discussed was the possibility of employment and insurance discrimination based on genetic test results and the laws in place to prevent this ([place]).    We discussed panel testing, which would involve testing for BRCA1/2 as well as 11 additional genes that are associated with increased cancer risk. The benefits and limitations of genetic testing were discussed, and Ms. Fletcher decided to pursue testing via the panel. The implications of a positive or negative test result were discussed. We discussed the possibility that, in some cases, genetic test results may be informative or may be ambiguous due to the identification of a genetic variant. These variants may or may not be associated with an increased cancer risk. With multigene panel testing, it is not uncommon for a variant of uncertain significance (VUS) to be identified. If a VUS is identified, testing family members is typically not recommended and screening recommendations are made  based on the family history. The laboratories that perform genetic testing work to reclassify the VUS and send out an amended report if and when a VUS is reclassified. The majority of variant findings are ultimately reclassified to a negative result. Given her personal and family history, a negative test result would not eliminate all cancer risk to her relatives, although the risk would not be as high as it would with positive genetic testing.      TEST RESULTS:  Genetic testing was negative by sequencing and deletion/duplication testing for mutations in BRCA1/2 and the additional 11 genes on the BRCAplus panel. The negative result greatly lowers the risk of a hereditary cancer syndrome for Ms. Fletcher. Her unaffected female relatives may still be considered to have a somewhat increased risk for breast cancer based on family history. This assessment is based on the information provided at the time of the consultation.    CANCER PREVENTION:  Despite the negative genetic test results, Ms. Fletcher's female relatives may have a somewhat increased lifetime risk for breast cancer based on family history. Female relatives could have a risk assessment performed using a family history-based model, such as the Tyrer-Cuzick model, to determine their individual risks. Given the increased risk, options available to individuals with an elevated lifetime risk for breast cancer were briefly discussed. Surveillance for relatives found to have an elevated lifetime risk of breast cancer (>20%, versus the average risk of 12%), based on NCCN guidelines, would consist of semi-annual clinical breast exams and monthly self-breast exams starting by age 18 and annual mammography starting 10 years younger than the earliest diagnosis in a close relative, or by age 40. According to an American Cancer Society expert panel and NCCN guidelines, annual breast MRI should be offered to women whose lifetime risk of breast cancer is 20-25 percent or more,  also starting 10 years prior to the earliest diagnosis in the family, or by age 40.      PLAN: Genetic counseling remains available to Ms. Fletcher and her family. She is welcome to contact us in the future with any questions she may have at [phone].      Margarita Rothman MS, [facility] [place], [facility]  Licensed Certified Genetic Counselor       Cc: Mary Ann Saldivar MD

## 2024-12-19 NOTE — TELEPHONE ENCOUNTER
Attempted to contact Ms. Fletcher a second time regarding genetic test results. She did not answer. Left  asking for her to return my call.

## 2024-12-20 DIAGNOSIS — C50.912 BREAST CANCER, STAGE 1, ESTROGEN RECEPTOR POSITIVE, LEFT: Primary | ICD-10-CM

## 2024-12-20 DIAGNOSIS — Z17.0 BREAST CANCER, STAGE 1, ESTROGEN RECEPTOR POSITIVE, LEFT: Primary | ICD-10-CM

## 2024-12-23 ENCOUNTER — HOSPITAL ENCOUNTER (OUTPATIENT)
Dept: ONCOLOGY | Facility: HOSPITAL | Age: 44
Discharge: HOME | End: 2024-12-23
Admitting: INTERNAL MEDICINE
Payer: COMMERCIAL

## 2024-12-23 VITALS
BODY MASS INDEX: 43.34 KG/M2 | SYSTOLIC BLOOD PRESSURE: 126 MMHG | HEART RATE: 79 BPM | WEIGHT: 244.6 LBS | OXYGEN SATURATION: 94 % | RESPIRATION RATE: 16 BRPM | DIASTOLIC BLOOD PRESSURE: 82 MMHG | HEIGHT: 63 IN | TEMPERATURE: 98.1 F

## 2024-12-23 DIAGNOSIS — C50.912 BREAST CANCER, STAGE 1, ESTROGEN RECEPTOR POSITIVE, LEFT: Primary | ICD-10-CM

## 2024-12-23 DIAGNOSIS — Z17.0 BREAST CANCER, STAGE 1, ESTROGEN RECEPTOR POSITIVE, LEFT: Primary | ICD-10-CM

## 2024-12-23 LAB
ALP BLD-CCNC: 60 U/L (ref 42–141)
BASOPHILS # BLD AUTO: 0.05 10*3/MM3 (ref 0–0.2)
BASOPHILS NFR BLD AUTO: 0.3 % (ref 0–1.5)
BUN BLDA-MCNC: 13 MG/DL (ref 7–22)
CALCIUM BLD QL: 9.6 MG/DL (ref 8–10.3)
CHLORIDE BLDA-SCNC: 110 MMOL/L (ref 98–108)
CO2 BLDA-SCNC: 29 MMOL/L (ref 18–33)
CREAT BLDA-MCNC: 0.9 MG/DL (ref 0.6–1.2)
DEPRECATED RDW RBC AUTO: 50.2 FL (ref 37–54)
EGFRCR SERPLBLD CKD-EPI 2021: 82 ML/MIN/1.73
EOSINOPHIL # BLD AUTO: 0 10*3/MM3 (ref 0–0.4)
EOSINOPHIL NFR BLD AUTO: 0 % (ref 0.3–6.2)
ERYTHROCYTE [DISTWIDTH] IN BLOOD BY AUTOMATED COUNT: 14.5 % (ref 12.3–15.4)
GLUCOSE BLDC GLUCOMTR-MCNC: 128 MG/DL (ref 73–118)
HCT VFR BLD AUTO: 36.9 % (ref 34–46.6)
HGB BLD-MCNC: 12 G/DL (ref 12–15.9)
LYMPHOCYTES # BLD AUTO: 2.31 10*3/MM3 (ref 0.7–3.1)
LYMPHOCYTES NFR BLD AUTO: 13.5 % (ref 19.6–45.3)
MAGNESIUM SERPL-MCNC: 2 MG/DL (ref 1.6–2.6)
MCH RBC QN AUTO: 32.4 PG (ref 26.6–33)
MCHC RBC AUTO-ENTMCNC: 32.5 G/DL (ref 31.5–35.7)
MCV RBC AUTO: 99.7 FL (ref 79–97)
MONOCYTES # BLD AUTO: 0.35 10*3/MM3 (ref 0.1–0.9)
MONOCYTES NFR BLD AUTO: 2 % (ref 5–12)
NEUTROPHILS NFR BLD AUTO: 14.38 10*3/MM3 (ref 1.7–7)
NEUTROPHILS NFR BLD AUTO: 84.2 % (ref 42.7–76)
PLATELET # BLD AUTO: 249 10*3/MM3 (ref 140–450)
PMV BLD AUTO: 9.9 FL (ref 6–12)
POC ALBUMIN: 3.6 G/L (ref 3.3–5.5)
POC ALT (SGPT): 39 U/L (ref 10–47)
POC AST (SGOT): 35 U/L (ref 11–38)
POC TOTAL BILIRUBIN: 0.5 MG/DL (ref 0.2–1.6)
POC TOTAL PROTEIN: 7.4 G/DL (ref 6.4–8.1)
POTASSIUM BLDA-SCNC: 4.8 MMOL/L (ref 3.6–5.1)
RBC # BLD AUTO: 3.7 10*6/MM3 (ref 3.77–5.28)
SODIUM BLD-SCNC: 143 MMOL/L (ref 128–145)
WBC NRBC COR # BLD AUTO: 17.09 10*3/MM3 (ref 3.4–10.8)

## 2024-12-23 PROCEDURE — 96413 CHEMO IV INFUSION 1 HR: CPT

## 2024-12-23 PROCEDURE — 96367 TX/PROPH/DG ADDL SEQ IV INF: CPT

## 2024-12-23 PROCEDURE — 83735 ASSAY OF MAGNESIUM: CPT | Performed by: INTERNAL MEDICINE

## 2024-12-23 PROCEDURE — 25010000002 CARBOPLATIN PER 50 MG: Performed by: PHYSICIAN ASSISTANT

## 2024-12-23 PROCEDURE — 25010000002 DOCETAXEL 20 MG/ML SOLUTION 8 ML VIAL: Performed by: PHYSICIAN ASSISTANT

## 2024-12-23 PROCEDURE — 96417 CHEMO IV INFUS EACH ADDL SEQ: CPT

## 2024-12-23 PROCEDURE — 25010000002 FOSAPREPITANT PER 1 MG: Performed by: PHYSICIAN ASSISTANT

## 2024-12-23 PROCEDURE — 25010000002 PERTUZUMAB 420 MG/14ML SOLUTION 420 MG VIAL: Performed by: PHYSICIAN ASSISTANT

## 2024-12-23 PROCEDURE — 96415 CHEMO IV INFUSION ADDL HR: CPT

## 2024-12-23 PROCEDURE — 80053 COMPREHEN METABOLIC PANEL: CPT

## 2024-12-23 PROCEDURE — 25010000002 DEXAMETHASONE PER 1 MG: Performed by: PHYSICIAN ASSISTANT

## 2024-12-23 PROCEDURE — 25810000003 SODIUM CHLORIDE 0.9 % SOLUTION 250 ML FLEX CONT: Performed by: PHYSICIAN ASSISTANT

## 2024-12-23 PROCEDURE — 96375 TX/PRO/DX INJ NEW DRUG ADDON: CPT

## 2024-12-23 PROCEDURE — 85025 COMPLETE CBC W/AUTO DIFF WBC: CPT | Performed by: INTERNAL MEDICINE

## 2024-12-23 PROCEDURE — 25010000002 PALONOSETRON PER 25 MCG: Performed by: PHYSICIAN ASSISTANT

## 2024-12-23 PROCEDURE — 25010000002 TRASTUZUMAB-ANNS 420 MG RECONSTITUTED SOLUTION 1 EACH VIAL: Performed by: PHYSICIAN ASSISTANT

## 2024-12-23 PROCEDURE — 25010000002 HEPARIN LOCK FLUSH PER 10 UNITS: Performed by: INTERNAL MEDICINE

## 2024-12-23 RX ORDER — SODIUM CHLORIDE 0.9 % (FLUSH) 0.9 %
20 SYRINGE (ML) INJECTION AS NEEDED
OUTPATIENT
Start: 2024-12-23

## 2024-12-23 RX ORDER — PALONOSETRON 0.05 MG/ML
0.25 INJECTION, SOLUTION INTRAVENOUS ONCE
Status: CANCELLED | OUTPATIENT
Start: 2024-12-23

## 2024-12-23 RX ORDER — SODIUM CHLORIDE 0.9 % (FLUSH) 0.9 %
20 SYRINGE (ML) INJECTION AS NEEDED
Status: DISCONTINUED | OUTPATIENT
Start: 2024-12-23 | End: 2024-12-24 | Stop reason: HOSPADM

## 2024-12-23 RX ORDER — PALONOSETRON 0.05 MG/ML
0.25 INJECTION, SOLUTION INTRAVENOUS ONCE
Status: COMPLETED | OUTPATIENT
Start: 2024-12-23 | End: 2024-12-23

## 2024-12-23 RX ORDER — DEXAMETHASONE SODIUM PHOSPHATE 4 MG/ML
8 INJECTION, SOLUTION INTRA-ARTICULAR; INTRALESIONAL; INTRAMUSCULAR; INTRAVENOUS; SOFT TISSUE ONCE
Status: CANCELLED
Start: 2024-12-23 | End: 2024-12-23

## 2024-12-23 RX ORDER — DEXAMETHASONE SODIUM PHOSPHATE 4 MG/ML
8 INJECTION, SOLUTION INTRA-ARTICULAR; INTRALESIONAL; INTRAMUSCULAR; INTRAVENOUS; SOFT TISSUE ONCE
Status: COMPLETED | OUTPATIENT
Start: 2024-12-23 | End: 2024-12-23

## 2024-12-23 RX ORDER — HEPARIN SODIUM (PORCINE) LOCK FLUSH IV SOLN 100 UNIT/ML 100 UNIT/ML
500 SOLUTION INTRAVENOUS AS NEEDED
Status: DISCONTINUED | OUTPATIENT
Start: 2024-12-23 | End: 2024-12-24 | Stop reason: HOSPADM

## 2024-12-23 RX ORDER — SODIUM CHLORIDE 9 MG/ML
20 INJECTION, SOLUTION INTRAVENOUS ONCE
Status: DISCONTINUED | OUTPATIENT
Start: 2024-12-23 | End: 2024-12-24 | Stop reason: HOSPADM

## 2024-12-23 RX ORDER — HEPARIN SODIUM (PORCINE) LOCK FLUSH IV SOLN 100 UNIT/ML 100 UNIT/ML
500 SOLUTION INTRAVENOUS AS NEEDED
OUTPATIENT
Start: 2024-12-23

## 2024-12-23 RX ORDER — DIPHENHYDRAMINE HYDROCHLORIDE 50 MG/ML
50 INJECTION INTRAMUSCULAR; INTRAVENOUS AS NEEDED
Status: CANCELLED | OUTPATIENT
Start: 2024-12-23

## 2024-12-23 RX ORDER — HYDROCORTISONE SODIUM SUCCINATE 100 MG/2ML
100 INJECTION INTRAMUSCULAR; INTRAVENOUS AS NEEDED
Status: CANCELLED | OUTPATIENT
Start: 2024-12-23

## 2024-12-23 RX ORDER — FAMOTIDINE 10 MG/ML
20 INJECTION, SOLUTION INTRAVENOUS AS NEEDED
Status: CANCELLED | OUTPATIENT
Start: 2024-12-23

## 2024-12-23 RX ORDER — SODIUM CHLORIDE 9 MG/ML
20 INJECTION, SOLUTION INTRAVENOUS ONCE
Status: CANCELLED | OUTPATIENT
Start: 2024-12-23

## 2024-12-23 RX ADMIN — SODIUM CHLORIDE 900 MG: 900 INJECTION, SOLUTION INTRAVENOUS at 14:52

## 2024-12-23 RX ADMIN — PALONOSETRON HYDROCHLORIDE 0.25 MG: 0.25 INJECTION INTRAVENOUS at 13:33

## 2024-12-23 RX ADMIN — DOCETAXEL 155 MG: 20 INJECTION, SOLUTION, CONCENTRATE INTRAVENOUS at 13:46

## 2024-12-23 RX ADMIN — Medication 20 ML: at 15:25

## 2024-12-23 RX ADMIN — DEXAMETHASONE SODIUM PHOSPHATE 8 MG: 4 INJECTION, SOLUTION INTRAMUSCULAR; INTRAVENOUS at 11:42

## 2024-12-23 RX ADMIN — FOSAPREPITANT 100 ML: 150 INJECTION, POWDER, LYOPHILIZED, FOR SOLUTION INTRAVENOUS at 10:06

## 2024-12-23 RX ADMIN — PERTUZUMAB 420 MG: 30 INJECTION, SOLUTION, CONCENTRATE INTRAVENOUS at 10:38

## 2024-12-23 RX ADMIN — TRASTUZUMAB-ANNS 640 MG: 420 INJECTION, POWDER, LYOPHILIZED, FOR SOLUTION INTRAVENOUS at 11:57

## 2024-12-23 RX ADMIN — HEPARIN 500 UNITS: 100 SYRINGE at 15:25
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
## 2024-12-26 ENCOUNTER — HOSPITAL ENCOUNTER (OUTPATIENT)
Dept: ONCOLOGY | Facility: HOSPITAL | Age: 44
Discharge: HOME | End: 2024-12-26
Payer: COMMERCIAL

## 2024-12-26 DIAGNOSIS — Z17.0 BREAST CANCER, STAGE 1, ESTROGEN RECEPTOR POSITIVE, LEFT: Primary | ICD-10-CM

## 2024-12-26 DIAGNOSIS — C50.912 BREAST CANCER, STAGE 1, ESTROGEN RECEPTOR POSITIVE, LEFT: Primary | ICD-10-CM

## 2024-12-26 PROCEDURE — 25010000002 PEGFILGRASTIM-JMDB 6 MG/0.6ML SOLUTION PREFILLED SYRINGE: Performed by: PHYSICIAN ASSISTANT

## 2024-12-26 PROCEDURE — 96372 THER/PROPH/DIAG INJ SC/IM: CPT

## 2024-12-26 RX ADMIN — PEGFILGRASTIM-JMDB 6 MG: 6 INJECTION SUBCUTANEOUS at 13:11

## 2024-12-30 ENCOUNTER — PATIENT MESSAGE (OUTPATIENT)
Dept: ONCOLOGY | Facility: CLINIC | Age: 44
End: 2024-12-30
Payer: COMMERCIAL

## 2024-12-31 NOTE — TELEPHONE ENCOUNTER
Called pt to discuss diarrhea. She stated that she has been alternating Imodium and Lomotil and is still having multiple episodes of diarrhea daily. Spoke to BRAXTON Nicholson who advised that the pt increase Lomotil to QID and continue to alternate with Imodium. She said that if increasing the frequency of both medications controls the diarrhea, she should continue one of the medications for an additional 12 hours and then discontinue. Called pt and relayed this to her. I also advised her to call back if increasing the frequency of these medications does not help. She verbalized understanding and stated that she has a follow-up with Dr. Saldivar on Friday. I advised her to make sure Dr. Saldivar is aware in case a dose reduction has to be done for her future treatments. She verbalized understanding.

## 2025-01-02 ENCOUNTER — OFFICE VISIT (OUTPATIENT)
Age: 45
End: 2025-01-02
Payer: COMMERCIAL

## 2025-01-02 VITALS
BODY MASS INDEX: 42.1 KG/M2 | SYSTOLIC BLOOD PRESSURE: 148 MMHG | WEIGHT: 237.6 LBS | HEIGHT: 63 IN | HEART RATE: 112 BPM | TEMPERATURE: 98 F | DIASTOLIC BLOOD PRESSURE: 85 MMHG | RESPIRATION RATE: 18 BRPM | OXYGEN SATURATION: 98 %

## 2025-01-02 DIAGNOSIS — Z17.0 BREAST CANCER, STAGE 1, ESTROGEN RECEPTOR POSITIVE, LEFT: Primary | ICD-10-CM

## 2025-01-02 DIAGNOSIS — C50.912 BREAST CANCER, STAGE 1, ESTROGEN RECEPTOR POSITIVE, LEFT: Primary | ICD-10-CM

## 2025-01-02 NOTE — PROGRESS NOTES
Follow-up appointment      MEDICAL ONCOLOGIST: Nora Saldivar MD  PLASTIC SURGEON: Micki Peoples MD    HISTORY OF PRESENT ILLNESS    This is a 44 y.o. female who presents with a cT2 cN0 Mx G3 ER+ (Strong, 100%) OR+ (Strong, 95%) Her2 Positive (2+ IHC, Amplified on FISH), clinical anatomic and prognostic stage IA invasive ductal carcinoma of the LEFT breast with DCIS (ER/OR+ G2, solid and cribriform types with comedonecrosis).  She underwent a Port-A-Cath placement on 11/25/2024 and has been undergoing neoadjuvant targeted chemotherapy  with Taxotere carboplatinum, Herceptin and Perjeta.  On 12/5/2024 she underwent a PET scan which redemonstrated her known 2.3 cm left lateral breast mass and additionally demonstrated a 7 mm hypermetabolic soft tissue nodule or lymph node in the left axillary tail consistent with malignancy.  There is no convincing evidence of metastatic disease elsewhere on the imaging.    She met with plastic surgery last month to discuss reconstruction and left with a plan for two-stage implant-based reconstruction with immediate tissue expander placement.  She is planning for bilateral mastectomies.    She presents today for repeat physical exam and check-in during neoadjuvant therapy.      Today she is doing well but reports that her second round of chemotherapy was much harder than the first.  She should be finishing up with chemotherapy around the end of February or start of March.      PAST MEDICAL HISTORY:  Past Medical History:   Diagnosis Date    Anxiety     Cancer     Left Breast         Dr Saldivar    Depression     Obesity     Most adult life       PAST SURGICAL HISTORY:  Past Surgical History:   Procedure Laterality Date    BREAST BIOPSY  10/31    Left side    PORTACATH PLACEMENT Right 11/25/2024    Procedure: INSERTION OF PORTACATH RIGHT INTERNAL JUGULAR;  Surgeon: Gonzalez Vanessa MD;  Location: [facility] OR;  Service: General;  Laterality: Right;    TONSILLECTOMY      US  GUIDED LYMPH NODE BIOPSY  10/31/2024       Family History:  Family History   Problem Relation Age of Onset    Depression Mother     Diabetes Mother     Depression Sister         Sister    Diabetes Maternal Grandmother         Passed away    Esophageal cancer Maternal Grandmother     Breast cancer Maternal Aunt         Dx 50s       She has a family history significant for esophageal cancer in her maternal grandmother.  She denies any family history of breast cancer, prostate cancer, colon cancer, ovarian cancer, pancreatic cancer, or melanoma.     She is not of Ashkenazi Taoist descent.  She has never been genetically tested.    SOCIAL HISTORY:  Social History     Tobacco Use    Smoking status: Former     Types: Cigarettes     Start date: 2022     Passive exposure: Past    Smokeless tobacco: Never    Tobacco comments:     On and off for many years early twenties. Quit for kultiple years. Then sporadically last few years.   Vaping Use    Vaping status: Never Used   Substance Use Topics    Alcohol use: Yes     Alcohol/week: 1.0 standard drink of alcohol     Types: 1 Cans of beer per week     Comment: One beer maybe 1 a month.    Drug use: Never       Patient works as at [employer] in [place].  Patient denies any smoking, alcohol or drug use.  Her sister will be her primary point of support at home through treatment.     Medications:   Outpatient Encounter Medications as of 1/2/2025   Medication Sig Dispense Refill    buPROPion XL (Wellbutrin XL) 150 MG 24 hr tablet Take 1 tablet by mouth Daily. 90 tablet 0    clobetasol (TEMOVATE) 0.05 % external solution Apply  topically to the appropriate area as directed.      dexAMETHasone (DECADRON) 4 MG tablet Take 2 tablets oral twice a day for 3 consecutive days beginning the day before chemotherapy and continue for 6 doses. 12 tablet 5    diphenoxylate-atropine (LOMOTIL) 2.5-0.025 MG per tablet Take 1 tablet by mouth 3 (Three) Times a Day. 40 tablet 1    lidocaine-prilocaine  "(EMLA) 2.5-2.5 % cream Apply 1 Application topically to the appropriate area as directed Take As Directed. Apply to port 1 hour prior to access 30 g 3    OLANZapine (ZyPREXA) 5 MG tablet Take 1 tablet by mouth Every Night. Take nightly for 4 starting night of chemotherapy. 4 tablet 5    ondansetron (ZOFRAN) 8 MG tablet Take 1 tablet by mouth 3 (Three) Times a Day As Needed for Nausea or Vomiting. 30 tablet 5    pantoprazole (Protonix) 40 MG EC tablet Take 1 tablet by mouth Daily. 30 tablet 6    potassium chloride (KLOR-CON M20) 20 MEQ CR tablet       propranolol (INDERAL) 10 MG tablet TAKE 1 TABLET BY MOUTH 3 (THREE) TIMES A DAY AS NEEDED (ANXIETY). 90 tablet 0    vilazodone (VIIBRYD) 20 MG tablet tablet Take 1 tablet by mouth Daily. 90 tablet 0     No facility-administered encounter medications on file as of 1/2/2025.        Allergies:   Allergies   Allergen Reactions    Penicillins Hives     Beta lactam allergy details  Antibiotic reaction: hives  Age at reaction: infant  Dose to reaction time: unknown  Reason for antibiotic: unknown  Epinephrine required for reaction?: no  Tolerated antibiotics: unknown            Review of systems: A 14 point review of systems was obtained and was negative    PHYSICAL EXAM     /85 (BP Location: Left arm, Patient Position: Sitting, Cuff Size: Large Adult)   Pulse 112   Temp 98 °F (36.7 °C) (Infrared)   Resp 18   Ht 160 cm (63\")   Wt 108 kg (237 lb 9.6 oz)   SpO2 98%   BMI 42.09 kg/m²     General appearance:alert, appears stated age, and cooperative  Neck/Chest: Internal jugular Port-A-Cath palpable with no overlying skin changes.  Catheter tubing palpable along the right neck without overlying skin changes.  Breast Exam:  No skin changes to the left breast.  Mass in the left lateral breast is mobile and palpable measuring roughly 1.5 cm on exam.  No axillary lymphadenopathy on the left noted.     Patient exam or treatment required medical chaperone.  The sensitive " parts of the examination were performed with chaperone present: Cj Murry MA    IMAGING:  FDG NM PET/CT SKULL BASE TO MID THIGH     Date of Exam: 12/5/2024 11:45 AM EST     Indication: Breast Cancer Staging. History of chemotherapy treatment.     Comparison: CT chest abdomen pelvis 9/19/2024     Technique: PET imaging was obtained from skull base to mid-thigh approximately 60 minutes after radiotracer injection. A low dose non contrast CT was obtained for attenuation correction and anatomic localization. Fused PET-CT and 3D MIP reconstructions   were utilized for image interpretation.     Dose: 11.4 F-18 FDG  Fasting Blood Glucose Level: 89 mg/dl  Reference uptake values:  Mediastinum: 2.8 SUVmax  Liver: 3.34 SUVmax  Normalization method: Body Weight     Findings:        NECK:  No abnormal FDG accumulation within the neck. No adenopathy is seen.           CHEST:   2.3 x 1.8 cm hypermetabolic nodule in the left lateral breast SUV max 10.8 (image 120), is consistent with the patient's known malignancy. There is an additional soft tissue nodule or lymph node within the left axillary tail measuring 7 x 7 mm with SUV   max 5.66 (image 124), consistent with malignancy.     The known biopsy-proven malignant microcalcifications in the left breast on previous diagnostic mammogram imaging studies, are not identified on PET/CT.     No pathologic enlarged or hypermetabolic lymph nodes within the thoracic cavity proper. There are no suspicious pulmonary nodules. Heart size is normal. Right internal jugular approach portacatheter extends to the lower SVC level.           ABDOMEN/PELVIS:  No abnormal FDG accumulation within the abdomen or pelvis. No suspicious soft tissue mass or adenopathy is seen. Previously described bilateral ovarian cysts are not evident on this examination. Liver appears steatotic. Gallbladder, spleen, pancreas,   adrenals, kidneys, bowel, urinary bladder, uterus, and rectum appear within the limits.            SKELETAL:  No suspicious osteolytic or osteoblastic abnormalities are evident. There is diffuse low-level FDG accumulation throughout the axial and appendicular skeleton in a pattern most in keeping with reactive marrow response to chemotherapy.           IMPRESSION:  1.2.3 cm hypermetabolic left lateral breast mass, consistent with the patient's known malignancy.  2.Additional 7 mm hypermetabolic soft tissue nodule or lymph node is seen within the left axillary tail, consistent with malignancy.  3.There is no convincing evidence of metastatic disease elsewhere within the neck, chest, abdomen, pelvis, or skeleton.  4.There is diffuse low-level FDG accumulation throughout the axial and appendicular skeleton in a pattern most in keeping with reactive marrow response to chemotherapy.           Electronically Signed: Susan Lara MD    12/10/2024 9:14 AM EST    Workstation ID: [id number]      Assessment:  This is a 44 y.o. female with a cT2 cN0 Mx G3 ER+ (Strong, 100%) AZ+ (Strong, 95%) Her2 Positive (2+ IHC, Amplified on FISH), clinical anatomic and prognostic stage IA invasive ductal carcinoma of the LEFT breast with DCIS (ER/AZ+ G2, solid and cribriform types with comedonecrosis).    She is currently on neoadjuvant targeted therapy with Taxotere carboplatinum, Herceptin and Perjeta.  On exam she appears to be having a good response to her chemotherapy.    Plan:  - Continue neoadjuvant targeted chemotherapy  - I will plan to see her back in clinic in early March after the completion of chemotherapy for preoperative visit where we will discuss her plan for bilateral mastectomies with a left sentinel lymph node biopsy and immediate tissue expander based reconstruction with Dr. Peoples.  She will also meet with Dr. Peoples again prior to surgery.  - I encouraged her to call into my clinic with any questions or concerns to discuss prior to that time.    Class 3 Severe Obesity (BMI >=40). Obesity-related health  conditions include the following: none. Obesity is unchanged. BMI is is above average; no BMI management plan is appropriate. We discussed  currently undergoing neoadjuvant chemotherapy for breast cancer, discussed the importance of adequate protein intake and hydration.  Additionally discussed research showing a correlation between exercise and improved response to chemotherapy .       Signed:    Gonzalez Vanessa MD  Breast Surgical Oncology  [facility]
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
## 2025-01-03 ENCOUNTER — OFFICE VISIT (OUTPATIENT)
Dept: ONCOLOGY | Facility: CLINIC | Age: 45
End: 2025-01-03
Payer: COMMERCIAL

## 2025-01-03 ENCOUNTER — HOSPITAL ENCOUNTER (OUTPATIENT)
Dept: ONCOLOGY | Facility: HOSPITAL | Age: 45
Discharge: HOME | End: 2025-01-03
Payer: COMMERCIAL

## 2025-01-03 VITALS
WEIGHT: 239 LBS | BODY MASS INDEX: 42.35 KG/M2 | TEMPERATURE: 98.2 F | SYSTOLIC BLOOD PRESSURE: 114 MMHG | OXYGEN SATURATION: 94 % | RESPIRATION RATE: 18 BRPM | DIASTOLIC BLOOD PRESSURE: 75 MMHG | HEIGHT: 63 IN | HEART RATE: 94 BPM

## 2025-01-03 DIAGNOSIS — Z17.0 BREAST CANCER, STAGE 1, ESTROGEN RECEPTOR POSITIVE, LEFT: ICD-10-CM

## 2025-01-03 DIAGNOSIS — R19.7 DIARRHEA, UNSPECIFIED TYPE: ICD-10-CM

## 2025-01-03 DIAGNOSIS — Z17.0 BREAST CANCER, STAGE 1, ESTROGEN RECEPTOR POSITIVE, LEFT: Primary | ICD-10-CM

## 2025-01-03 DIAGNOSIS — Z79.899 ENCOUNTER FOR MONITORING CARDIOTOXIC DRUG THERAPY: ICD-10-CM

## 2025-01-03 DIAGNOSIS — Z51.81 ENCOUNTER FOR MONITORING CARDIOTOXIC DRUG THERAPY: ICD-10-CM

## 2025-01-03 DIAGNOSIS — K76.0 HEPATIC STEATOSIS: ICD-10-CM

## 2025-01-03 DIAGNOSIS — C50.912 BREAST CANCER, STAGE 1, ESTROGEN RECEPTOR POSITIVE, LEFT: Primary | ICD-10-CM

## 2025-01-03 DIAGNOSIS — C50.912 BREAST CANCER, STAGE 2, LEFT: ICD-10-CM

## 2025-01-03 DIAGNOSIS — C50.912 BREAST CANCER, STAGE 1, ESTROGEN RECEPTOR POSITIVE, LEFT: ICD-10-CM

## 2025-01-03 DIAGNOSIS — R42 DIZZINESS: ICD-10-CM

## 2025-01-03 DIAGNOSIS — R42 DIZZINESS: Primary | ICD-10-CM

## 2025-01-03 LAB
BASOPHILS # BLD AUTO: 0.01 10*3/MM3 (ref 0–0.2)
BASOPHILS NFR BLD AUTO: 0.1 % (ref 0–1.5)
DEPRECATED RDW RBC AUTO: 50.3 FL (ref 37–54)
EOSINOPHIL # BLD AUTO: 0 10*3/MM3 (ref 0–0.4)
EOSINOPHIL NFR BLD AUTO: 0 % (ref 0.3–6.2)
ERYTHROCYTE [DISTWIDTH] IN BLOOD BY AUTOMATED COUNT: 14.8 % (ref 12.3–15.4)
HCT VFR BLD AUTO: 35.3 % (ref 34–46.6)
HGB BLD-MCNC: 11.6 G/DL (ref 12–15.9)
LYMPHOCYTES # BLD AUTO: 2.62 10*3/MM3 (ref 0.7–3.1)
LYMPHOCYTES NFR BLD AUTO: 38.2 % (ref 19.6–45.3)
MCH RBC QN AUTO: 32.8 PG (ref 26.6–33)
MCHC RBC AUTO-ENTMCNC: 32.9 G/DL (ref 31.5–35.7)
MCV RBC AUTO: 99.7 FL (ref 79–97)
MONOCYTES # BLD AUTO: 0.28 10*3/MM3 (ref 0.1–0.9)
MONOCYTES NFR BLD AUTO: 4.1 % (ref 5–12)
NEUTROPHILS NFR BLD AUTO: 3.94 10*3/MM3 (ref 1.7–7)
NEUTROPHILS NFR BLD AUTO: 57.6 % (ref 42.7–76)
PLATELET # BLD AUTO: 153 10*3/MM3 (ref 140–450)
PMV BLD AUTO: 10.1 FL (ref 6–12)
RBC # BLD AUTO: 3.54 10*6/MM3 (ref 3.77–5.28)
WBC NRBC COR # BLD AUTO: 6.85 10*3/MM3 (ref 3.4–10.8)

## 2025-01-03 PROCEDURE — 96360 HYDRATION IV INFUSION INIT: CPT

## 2025-01-03 PROCEDURE — 85025 COMPLETE CBC W/AUTO DIFF WBC: CPT | Performed by: INTERNAL MEDICINE

## 2025-01-03 PROCEDURE — 25010000002 HEPARIN LOCK FLUSH PER 10 UNITS: Performed by: INTERNAL MEDICINE

## 2025-01-03 PROCEDURE — 25810000003 SODIUM CHLORIDE 0.9 % SOLUTION: Performed by: INTERNAL MEDICINE

## 2025-01-03 RX ORDER — HEPARIN SODIUM (PORCINE) LOCK FLUSH IV SOLN 100 UNIT/ML 100 UNIT/ML
500 SOLUTION INTRAVENOUS AS NEEDED
OUTPATIENT
Start: 2025-01-03

## 2025-01-03 RX ORDER — SODIUM CHLORIDE 0.9 % (FLUSH) 0.9 %
20 SYRINGE (ML) INJECTION AS NEEDED
Status: DISCONTINUED | OUTPATIENT
Start: 2025-01-03 | End: 2025-01-04 | Stop reason: HOSPADM

## 2025-01-03 RX ORDER — HEPARIN SODIUM (PORCINE) LOCK FLUSH IV SOLN 100 UNIT/ML 100 UNIT/ML
500 SOLUTION INTRAVENOUS AS NEEDED
Status: CANCELLED | OUTPATIENT
Start: 2025-01-03

## 2025-01-03 RX ORDER — HEPARIN SODIUM (PORCINE) LOCK FLUSH IV SOLN 100 UNIT/ML 100 UNIT/ML
500 SOLUTION INTRAVENOUS AS NEEDED
Status: DISCONTINUED | OUTPATIENT
Start: 2025-01-03 | End: 2025-01-04 | Stop reason: HOSPADM

## 2025-01-03 RX ORDER — SODIUM CHLORIDE 0.9 % (FLUSH) 0.9 %
20 SYRINGE (ML) INJECTION AS NEEDED
OUTPATIENT
Start: 2025-01-03

## 2025-01-03 RX ORDER — SODIUM CHLORIDE 9 MG/ML
1000 INJECTION, SOLUTION INTRAVENOUS ONCE
Status: COMPLETED | OUTPATIENT
Start: 2025-01-03 | End: 2025-01-03

## 2025-01-03 RX ORDER — SODIUM CHLORIDE 0.9 % (FLUSH) 0.9 %
20 SYRINGE (ML) INJECTION AS NEEDED
Status: CANCELLED | OUTPATIENT
Start: 2025-01-03

## 2025-01-03 RX ADMIN — HEPARIN 500 UNITS: 100 SYRINGE at 14:00

## 2025-01-03 RX ADMIN — SODIUM CHLORIDE 1000 ML/HR: 9 INJECTION, SOLUTION INTRAVENOUS at 12:54

## 2025-01-03 RX ADMIN — HEPARIN 500 UNITS: 100 SYRINGE at 11:09

## 2025-01-03 RX ADMIN — Medication 10 ML: at 14:00

## 2025-01-03 RX ADMIN — Medication 20 ML: at 11:08

## 2025-01-03 NOTE — PROGRESS NOTES
Patient added on for IVFs after MD visit. Ordered for 1L LR, due to shortage we do not have LR. 1L NS given over one hour. Patient denies further needs today and has next apts.

## 2025-01-03 NOTE — PROGRESS NOTES
"12/2/24:  Pt in infusion department for first treatment.  Met with pt as part of the treatment preparation process.  Pt accompanied by her sister, Shara with whom she lives.  Introductions made and contact information provided.  Reviewed supports and services available at [facility].  Pt has no advance directives in place and does not express an interest in completing any at this time.  Pt has worked at [employer] for four years.  She plans to continue working as much as possible.  She has STD and is knowledgeable about that process.  She does express some concern about her finances and is also expressing a need for peer support.  Port has been placed.  OSW will prepare resources for pt and caregiver support as well as financial assistance application for [email or web address].    1/3/25:  OSW met with pt and provided resources which were discussed on 12/2/24.  Pt has no additional questions or needs at this time.  "

## 2025-01-03 NOTE — PROGRESS NOTES
Port accessed and flushed with good blood return noted. 10cc of blood wasted prior to specimen collection. Blood specimen, cbc obtained and sent to lab for processing per protocol.  Port flushed with saline and heparin prior to needle removal.

## 2025-01-13 ENCOUNTER — HOSPITAL ENCOUNTER (OUTPATIENT)
Dept: ONCOLOGY | Facility: HOSPITAL | Age: 45
Discharge: HOME | End: 2025-01-13
Admitting: INTERNAL MEDICINE
Payer: COMMERCIAL

## 2025-01-13 ENCOUNTER — DOCUMENTATION (OUTPATIENT)
Dept: ONCOLOGY | Facility: CLINIC | Age: 45
End: 2025-01-13
Payer: COMMERCIAL

## 2025-01-13 VITALS
RESPIRATION RATE: 16 BRPM | BODY MASS INDEX: 44.03 KG/M2 | WEIGHT: 248.5 LBS | TEMPERATURE: 97.5 F | DIASTOLIC BLOOD PRESSURE: 76 MMHG | HEIGHT: 63 IN | OXYGEN SATURATION: 95 % | SYSTOLIC BLOOD PRESSURE: 117 MMHG | HEART RATE: 86 BPM

## 2025-01-13 DIAGNOSIS — C50.912 BREAST CANCER, STAGE 2, LEFT: ICD-10-CM

## 2025-01-13 DIAGNOSIS — R19.7 DIARRHEA, UNSPECIFIED TYPE: ICD-10-CM

## 2025-01-13 DIAGNOSIS — R42 DIZZINESS: ICD-10-CM

## 2025-01-13 DIAGNOSIS — Z17.0 BREAST CANCER, STAGE 1, ESTROGEN RECEPTOR POSITIVE, LEFT: Primary | ICD-10-CM

## 2025-01-13 DIAGNOSIS — C50.912 BREAST CANCER, STAGE 1, ESTROGEN RECEPTOR POSITIVE, LEFT: Primary | ICD-10-CM

## 2025-01-13 LAB
ALP BLD-CCNC: 64 U/L (ref 42–141)
B-HCG UR QL: NEGATIVE
BASOPHILS NFR BLD AUTO: ABNORMAL %
BASOPHILS NFR BLD AUTO: ABNORMAL %
BUN BLDA-MCNC: 11 MG/DL (ref 7–22)
CALCIUM BLD QL: 9.6 MG/DL (ref 8–10.3)
CHLORIDE BLDA-SCNC: 106 MMOL/L (ref 98–108)
CO2 BLDA-SCNC: 28 MMOL/L (ref 18–33)
CREAT BLDA-MCNC: 0.8 MG/DL (ref 0.6–1.2)
DEPRECATED RDW RBC AUTO: 59.6 FL (ref 37–54)
DEPRECATED RDW RBC AUTO: 60.5 FL (ref 37–54)
EGFRCR SERPLBLD CKD-EPI 2021: 94.5 ML/MIN/1.73
EOSINOPHIL # BLD AUTO: 0.01 10*3/MM3 (ref 0–0.4)
EOSINOPHIL # BLD AUTO: 0.01 10*3/MM3 (ref 0–0.4)
EOSINOPHIL NFR BLD AUTO: 0 % (ref 0.3–6.2)
EOSINOPHIL NFR BLD AUTO: 0 % (ref 0.3–6.2)
ERYTHROCYTE [DISTWIDTH] IN BLOOD BY AUTOMATED COUNT: 17.1 % (ref 12.3–15.4)
ERYTHROCYTE [DISTWIDTH] IN BLOOD BY AUTOMATED COUNT: 17.2 % (ref 12.3–15.4)
GLUCOSE BLDC GLUCOMTR-MCNC: 133 MG/DL (ref 73–118)
HCT VFR BLD AUTO: 34.6 % (ref 34–46.6)
HCT VFR BLD AUTO: 35 % (ref 34–46.6)
HGB BLD-MCNC: 11.2 G/DL (ref 12–15.9)
HGB BLD-MCNC: 11.4 G/DL (ref 12–15.9)
LYMPHOCYTES # BLD AUTO: 3.82 10*3/MM3 (ref 0.7–3.1)
LYMPHOCYTES # BLD AUTO: 3.84 10*3/MM3 (ref 0.7–3.1)
LYMPHOCYTES NFR BLD AUTO: 12.1 % (ref 19.6–45.3)
LYMPHOCYTES NFR BLD AUTO: 12.3 % (ref 19.6–45.3)
MAGNESIUM SERPL-MCNC: 1.8 MG/DL (ref 1.6–2.6)
MCH RBC QN AUTO: 33 PG (ref 26.6–33)
MCH RBC QN AUTO: 33.2 PG (ref 26.6–33)
MCHC RBC AUTO-ENTMCNC: 32.4 G/DL (ref 31.5–35.7)
MCHC RBC AUTO-ENTMCNC: 32.6 G/DL (ref 31.5–35.7)
MCV RBC AUTO: 102 FL (ref 79–97)
MCV RBC AUTO: 102.1 FL (ref 79–97)
MONOCYTES # BLD AUTO: 0.98 10*3/MM3 (ref 0.1–0.9)
MONOCYTES # BLD AUTO: 1 10*3/MM3 (ref 0.1–0.9)
MONOCYTES NFR BLD AUTO: 3.1 % (ref 5–12)
MONOCYTES NFR BLD AUTO: 3.2 % (ref 5–12)
NEUTROPHILS NFR BLD AUTO: ABNORMAL %
NEUTROPHILS NFR BLD AUTO: ABNORMAL %
PLATELET # BLD AUTO: 541 10*3/MM3 (ref 140–450)
PLATELET # BLD AUTO: 552 10*3/MM3 (ref 140–450)
PMV BLD AUTO: 10.3 FL (ref 6–12)
PMV BLD AUTO: 10.5 FL (ref 6–12)
POC ALBUMIN: 3.4 G/L (ref 3.3–5.5)
POC ALT (SGPT): 23 U/L (ref 10–47)
POC AST (SGOT): 30 U/L (ref 11–38)
POC TOTAL BILIRUBIN: 0.4 MG/DL (ref 0.2–1.6)
POC TOTAL PROTEIN: 6.6 G/DL (ref 6.4–8.1)
POTASSIUM BLDA-SCNC: 4.2 MMOL/L (ref 3.6–5.1)
RBC # BLD AUTO: 3.39 10*6/MM3 (ref 3.77–5.28)
RBC # BLD AUTO: 3.43 10*6/MM3 (ref 3.77–5.28)
SODIUM BLD-SCNC: 143 MMOL/L (ref 128–145)
WBC NRBC COR # BLD AUTO: 31 10*3/MM3 (ref 3.4–10.8)
WBC NRBC COR # BLD AUTO: 31.76 10*3/MM3 (ref 3.4–10.8)

## 2025-01-13 PROCEDURE — 81025 URINE PREGNANCY TEST: CPT | Performed by: INTERNAL MEDICINE

## 2025-01-13 PROCEDURE — 25010000002 DOCETAXEL 20 MG/ML SOLUTION 8 ML VIAL: Performed by: NURSE PRACTITIONER

## 2025-01-13 PROCEDURE — 25010000002 FOSAPREPITANT PER 1 MG: Performed by: NURSE PRACTITIONER

## 2025-01-13 PROCEDURE — 85025 COMPLETE CBC W/AUTO DIFF WBC: CPT | Performed by: INTERNAL MEDICINE

## 2025-01-13 PROCEDURE — 80053 COMPREHEN METABOLIC PANEL: CPT

## 2025-01-13 PROCEDURE — 96417 CHEMO IV INFUS EACH ADDL SEQ: CPT

## 2025-01-13 PROCEDURE — 25810000003 SODIUM CHLORIDE 0.9 % SOLUTION 250 ML FLEX CONT: Performed by: NURSE PRACTITIONER

## 2025-01-13 PROCEDURE — 96413 CHEMO IV INFUSION 1 HR: CPT

## 2025-01-13 PROCEDURE — 25010000002 TRASTUZUMAB-ANNS 420 MG RECONSTITUTED SOLUTION 1 EACH VIAL: Performed by: NURSE PRACTITIONER

## 2025-01-13 PROCEDURE — 96375 TX/PRO/DX INJ NEW DRUG ADDON: CPT

## 2025-01-13 PROCEDURE — 25010000002 HEPARIN LOCK FLUSH PER 10 UNITS: Performed by: INTERNAL MEDICINE

## 2025-01-13 PROCEDURE — 96367 TX/PROPH/DG ADDL SEQ IV INF: CPT

## 2025-01-13 PROCEDURE — 25010000002 PALONOSETRON 0.25 MG/5ML SOLUTION PREFILLED SYRINGE: Performed by: NURSE PRACTITIONER

## 2025-01-13 PROCEDURE — 25010000002 PERTUZUMAB 420 MG/14ML SOLUTION 420 MG VIAL: Performed by: NURSE PRACTITIONER

## 2025-01-13 PROCEDURE — 25010000002 CARBOPLATIN PER 50 MG: Performed by: NURSE PRACTITIONER

## 2025-01-13 PROCEDURE — 83735 ASSAY OF MAGNESIUM: CPT | Performed by: INTERNAL MEDICINE

## 2025-01-13 RX ORDER — HEPARIN SODIUM (PORCINE) LOCK FLUSH IV SOLN 100 UNIT/ML 100 UNIT/ML
500 SOLUTION INTRAVENOUS AS NEEDED
Status: DISCONTINUED | OUTPATIENT
Start: 2025-01-13 | End: 2025-01-14 | Stop reason: HOSPADM

## 2025-01-13 RX ORDER — SODIUM CHLORIDE 0.9 % (FLUSH) 0.9 %
20 SYRINGE (ML) INJECTION AS NEEDED
Status: CANCELLED | OUTPATIENT
Start: 2025-01-13

## 2025-01-13 RX ORDER — SODIUM CHLORIDE 9 MG/ML
20 INJECTION, SOLUTION INTRAVENOUS ONCE
Status: CANCELLED | OUTPATIENT
Start: 2025-01-13

## 2025-01-13 RX ORDER — HEPARIN SODIUM (PORCINE) LOCK FLUSH IV SOLN 100 UNIT/ML 100 UNIT/ML
500 SOLUTION INTRAVENOUS AS NEEDED
Status: CANCELLED | OUTPATIENT
Start: 2025-01-13

## 2025-01-13 RX ORDER — FAMOTIDINE 10 MG/ML
20 INJECTION, SOLUTION INTRAVENOUS AS NEEDED
Status: CANCELLED | OUTPATIENT
Start: 2025-01-13

## 2025-01-13 RX ORDER — DIPHENHYDRAMINE HYDROCHLORIDE 50 MG/ML
50 INJECTION INTRAMUSCULAR; INTRAVENOUS AS NEEDED
Status: CANCELLED | OUTPATIENT
Start: 2025-01-13

## 2025-01-13 RX ORDER — SODIUM CHLORIDE 0.9 % (FLUSH) 0.9 %
20 SYRINGE (ML) INJECTION AS NEEDED
Status: DISCONTINUED | OUTPATIENT
Start: 2025-01-13 | End: 2025-01-14 | Stop reason: HOSPADM

## 2025-01-13 RX ORDER — SODIUM CHLORIDE 9 MG/ML
20 INJECTION, SOLUTION INTRAVENOUS ONCE
Status: DISCONTINUED | OUTPATIENT
Start: 2025-01-13 | End: 2025-01-14 | Stop reason: HOSPADM

## 2025-01-13 RX ORDER — HYDROCORTISONE SODIUM SUCCINATE 100 MG/2ML
100 INJECTION INTRAMUSCULAR; INTRAVENOUS AS NEEDED
Status: CANCELLED | OUTPATIENT
Start: 2025-01-13

## 2025-01-13 RX ORDER — PALONOSETRON 0.05 MG/ML
0.25 INJECTION, SOLUTION INTRAVENOUS ONCE
Status: COMPLETED | OUTPATIENT
Start: 2025-01-13 | End: 2025-01-13

## 2025-01-13 RX ORDER — PALONOSETRON 0.05 MG/ML
0.25 INJECTION, SOLUTION INTRAVENOUS ONCE
Status: CANCELLED | OUTPATIENT
Start: 2025-01-13

## 2025-01-13 RX ADMIN — FOSAPREPITANT 100 ML: 150 INJECTION, POWDER, LYOPHILIZED, FOR SOLUTION INTRAVENOUS at 12:22

## 2025-01-13 RX ADMIN — PERTUZUMAB 420 MG: 30 INJECTION, SOLUTION, CONCENTRATE INTRAVENOUS at 10:27

## 2025-01-13 RX ADMIN — HEPARIN 500 UNITS: 100 SYRINGE at 14:39

## 2025-01-13 RX ADMIN — Medication 20 ML: at 14:39

## 2025-01-13 RX ADMIN — DOCETAXEL 160 MG: 20 INJECTION, SOLUTION, CONCENTRATE INTRAVENOUS at 12:55

## 2025-01-13 RX ADMIN — CARBOPLATIN 900 MG: 10 INJECTION, SOLUTION INTRAVENOUS at 14:03

## 2025-01-13 RX ADMIN — PALONOSETRON 0.25 MG: 0.25 INJECTION, SOLUTION INTRAVENOUS at 12:20

## 2025-01-13 RX ADMIN — TRASTUZUMAB-ANNS 680 MG: 420 INJECTION, POWDER, LYOPHILIZED, FOR SOLUTION INTRAVENOUS at 11:40

## 2025-01-13 NOTE — PROGRESS NOTES
Pt is here for C3D1 TCHP, she took dex as instructed, she reports having soft stools at present (she did not collect stool studies as ordered 1/3/25-reports was not given instructions or supplies). She reports hasn't slept in 24 hours and has dull headache x 1 week (reports bit blurred vision/feeling off balance but thinks d/t lack of sleep).She reports 2nd cycle was pretty tough with diarrhea/nausea/fatigue. VSS-Oxygen 95% on Room air. She reports has noted bit SOA with exertion with activity . WBC 31, Hgb 11.4, Plts 541 (ANC did not calculate and have drawn another specimen to check). YANDY donnelly and Mag processing at [facility]. She is scheduled for Dr Saldivar 1/22/25. Reviewed with Lyn ENCISO NP and to have orthostatic VS (resulted with laying 112/70 HR 96, Sitting 116/74 HR 98 Standing 125/78 HR 92) and to review above with Dr Saldivar.  Reviewed above with Dr Saldivar and pt to proceed with treatment as planned and to hold fulphilia with this cycle and have CBC  next week.  Reviewed with pt and v/u and agreement and reviewed with Lyn ENCISO NP and plan signed.  Pt provided with stool collection supplies if diarrhea develops again.  Pt scheduled for IVF on 1/15/25 as per Dr Saldivar instruction

## 2025-01-13 NOTE — PATIENT INSTRUCTIONS
Continue Dex for this evening and twice tomm      Take Olanzapine 5 mg for 4 nights beginning this evening

## 2025-01-13 NOTE — PROGRESS NOTES
Met the pt and her sister during infusion. Pt getting her third infusion today. Pt reports having diarrhea with previous two cycles. Pt used Enterade with both cycles, but stated she struggled to consume the prescribed amount for her second cycle due to nausea worsening when she tried drinking it. Pt noticed a difference an increase of diarrhea when she did not take Enterade as prescribed.  Pt was not taking Zofran daily/consistently, she took it when she felt nauseous. I advised she take it on a consistent schedule as means to better control nausea, pt said she will do this. I am hopeful this will help improve tolerance of Enterade.    Pt has 8-9 packets of Enterade at home. I gave her 30 more packets today (3 boxes), this should be enough for ~2 weeks. I advised her to have two packets/day, starting today, until her diarrhea has improved after this cycle of treatment, pt verbalized understanding.    All questions and concerns were addressed and answered. I provided my contact information and encouraged them to call or schedule an appointment as needed.    Colin Mncair, MS,RD,LD-[place],CD-[place]  Registered Dietitian

## 2025-01-16 ENCOUNTER — OFFICE VISIT (OUTPATIENT)
Dept: FAMILY MEDICINE CLINIC | Facility: CLINIC | Age: 45
End: 2025-01-16
Payer: COMMERCIAL

## 2025-01-16 VITALS
DIASTOLIC BLOOD PRESSURE: 71 MMHG | BODY MASS INDEX: 44.92 KG/M2 | TEMPERATURE: 97.5 F | HEIGHT: 63 IN | HEART RATE: 93 BPM | OXYGEN SATURATION: 96 % | SYSTOLIC BLOOD PRESSURE: 114 MMHG | WEIGHT: 253.5 LBS | RESPIRATION RATE: 16 BRPM

## 2025-01-16 DIAGNOSIS — F32.A ANXIETY AND DEPRESSION: Primary | ICD-10-CM

## 2025-01-16 DIAGNOSIS — F41.9 ANXIETY AND DEPRESSION: Primary | ICD-10-CM

## 2025-01-16 PROCEDURE — 99214 OFFICE O/P EST MOD 30 MIN: CPT | Performed by: NURSE PRACTITIONER

## 2025-01-16 RX ORDER — VILAZODONE HYDROCHLORIDE 20 MG/1
20 TABLET ORAL DAILY
Qty: 90 TABLET | Refills: 1 | Status: SHIPPED | OUTPATIENT
Start: 2025-01-16

## 2025-01-16 RX ORDER — BUPROPION HYDROCHLORIDE 150 MG/1
150 TABLET ORAL DAILY
Qty: 90 TABLET | Refills: 1 | Status: SHIPPED | OUTPATIENT
Start: 2025-01-16

## 2025-01-16 NOTE — PROGRESS NOTES
Chief Complaint  Anxiety (                                                                                                                                                         ) and Depression (                                      )    Subjective        Mary Ann Fletcher presents to [facility]  History of Present Illness    Patient presents today to follow-up on anxiety and depression, and provide update on breast cancer.    Anxiety and depression: Status 30 % improved. Current medication includes Viibryd 20 mg daily, bupropion 150 mg extended release daily, and propranolol 10 mg as needed (takes on occasion).  Preemptive worry has lessened. [place] panicked last week over side effects of chemo. Loss of hair. Looking in mirror and don't see self. Trying to not feel like cancer. Co-workers hovering. Reading a lot online through social media with others in same shoes. Angry sometimes- but not dwelling. Feeling sad, but not helpless anymore. Sleep is better. Has moments of need to cry. Still some guilt related to work responsibilities. Had a good week last week. Parent/s coming and staying during chemo week. Talking with another family friend who has been through breast cancer tx; learning tips and tricks. No longer feeling constant anxiety, feeling of doom is gone.       Breast update: Last visit with Dr. Vanessa on January 2, 2025; planning bilateral mastectomy after March; plastics per Dr. Peoples.  Last visit in office with Dr. Saldivar January 3, 2025.  Echocardiogram completed for high risk medication.  PET scan with no change.   There was referral to gastroenterology for chemotherapy-induced diarrhea. Extra fluids last week due to dehydration. Completed chemo this week Monday. No longer having diarrhea this round.  Feeling fatigued. Working 5-6 hrs/day -4 days a week- is a welcome distraction. Week 3 is best week with most energy. Expecting 18 weeks total treatment time.       Objective  "  Vital Signs:  /71 (BP Location: Right arm, Patient Position: Sitting, Cuff Size: Large Adult)   Pulse 93   Temp 97.5 °F (36.4 °C) (Infrared)   Resp 16   Ht 160 cm (63\")   Wt 115 kg (253 lb 8 oz)   SpO2 96%   BMI 44.91 kg/m²   Estimated body mass index is 44.91 kg/m² as calculated from the following:    Height as of this encounter: 160 cm (63\").    Weight as of this encounter: 115 kg (253 lb 8 oz).            Physical Exam  Constitutional:       General: She is not in acute distress.     Appearance: She is well-groomed. She is obese.   Cardiovascular:      Rate and Rhythm: Normal rate and regular rhythm.      Heart sounds: Normal heart sounds, S1 normal and S2 normal.   Pulmonary:      Effort: Pulmonary effort is normal.      Breath sounds: Normal breath sounds and air entry.   Skin:     General: Skin is warm and dry.   Neurological:      Mental Status: She is alert and oriented to person, place, and time.      Gait: Gait is intact.   Psychiatric:         Attention and Perception: Attention and perception normal.         Mood and Affect: Mood and affect normal.         Speech: Speech normal.         Behavior: Behavior normal.         Thought Content: Thought content normal. Thought content does not include homicidal or suicidal ideation.         Cognition and Memory: Memory normal.         Judgment: Judgment normal.        Result Review :    CMP          12/2/2024    08:10 12/23/2024    08:25 1/13/2025    08:23   CMP   Creatinine 1.10  0.90  0.80    EGFR 64.5  82.0  94.5      CBC          12/23/2024    07:58 1/3/2025    11:08 1/13/2025    08:05 1/13/2025    08:54   CBC   WBC 17.09  6.85  31.00  31.76    RBC 3.70  3.54  3.43  3.39    Hemoglobin 12.0  11.6  11.4  11.2    Hematocrit 36.9  35.3  35.0  34.6    MCV 99.7  99.7  102.0  102.1    MCH 32.4  32.8  33.2  33.0    MCHC 32.5  32.9  32.6  32.4    RDW 14.5  14.8  17.1  17.2    Platelets 249  153  541  552      CBC w/diff          12/23/2024    07:58 " 1/3/2025    11:08 1/13/2025    08:05 1/13/2025    08:54   CBC w/Diff   WBC 17.09  6.85  31.00  31.76    RBC 3.70  3.54  3.43  3.39    Hemoglobin 12.0  11.6  11.4  11.2    Hematocrit 36.9  35.3  35.0  34.6    MCV 99.7  99.7  102.0  102.1    MCH 32.4  32.8  33.2  33.0    MCHC 32.5  32.9  32.6  32.4    RDW 14.5  14.8  17.1  17.2    Platelets 249  153  541  552    Neutrophil Rel % 84.2  57.6      Lymphocyte Rel % 13.5  38.2  12.3  12.1    Monocyte Rel % 2.0  4.1  3.2  3.1    Eosinophil Rel % 0.0  0.0  0.0  0.0    Basophil Rel % 0.3  0.1            BMP          12/2/2024    08:10 12/23/2024    08:25 1/13/2025    08:23   BMP   Creatinine 1.10  0.90  0.80                    Assessment and Plan   Diagnoses and all orders for this visit:    1. Anxiety and depression (Primary)  -     buPROPion XL (Wellbutrin XL) 150 MG 24 hr tablet; Take 1 tablet by mouth Daily.  Dispense: 90 tablet; Refill: 1  -     vilazodone (VIIBRYD) 20 MG tablet tablet; Take 1 tablet by mouth Daily.  Dispense: 90 tablet; Refill: 1    Discussed individual therapy and support groups.  Prefers to stay with online research and familiar contacts for now.  Follow-up in 6 months or sooner if needed.         Follow Up   Return in about 6 months (around 7/16/2025).  Patient was given instructions and counseling regarding her condition or for health maintenance advice. Please see specific information pulled into the AVS if appropriate.

## 2025-01-20 ENCOUNTER — HOSPITAL ENCOUNTER (OUTPATIENT)
Dept: ONCOLOGY | Facility: HOSPITAL | Age: 45
Discharge: HOME | End: 2025-01-20
Admitting: INTERNAL MEDICINE
Payer: COMMERCIAL

## 2025-01-20 VITALS
HEIGHT: 63 IN | WEIGHT: 245.3 LBS | RESPIRATION RATE: 14 BRPM | SYSTOLIC BLOOD PRESSURE: 156 MMHG | BODY MASS INDEX: 43.46 KG/M2 | DIASTOLIC BLOOD PRESSURE: 83 MMHG | HEART RATE: 111 BPM | TEMPERATURE: 98.4 F | OXYGEN SATURATION: 100 %

## 2025-01-20 DIAGNOSIS — R42 DIZZINESS: ICD-10-CM

## 2025-01-20 DIAGNOSIS — C50.912 BREAST CANCER, STAGE 1, ESTROGEN RECEPTOR POSITIVE, LEFT: Primary | ICD-10-CM

## 2025-01-20 DIAGNOSIS — Z17.0 BREAST CANCER, STAGE 1, ESTROGEN RECEPTOR POSITIVE, LEFT: Primary | ICD-10-CM

## 2025-01-20 LAB
BASOPHILS # BLD AUTO: 0.03 10*3/MM3 (ref 0–0.2)
BASOPHILS NFR BLD AUTO: 0.7 % (ref 0–1.5)
DEPRECATED RDW RBC AUTO: 52.9 FL (ref 37–54)
EOSINOPHIL # BLD AUTO: 0 10*3/MM3 (ref 0–0.4)
EOSINOPHIL NFR BLD AUTO: 0 % (ref 0.3–6.2)
ERYTHROCYTE [DISTWIDTH] IN BLOOD BY AUTOMATED COUNT: 14.8 % (ref 12.3–15.4)
HCT VFR BLD AUTO: 35 % (ref 34–46.6)
HGB BLD-MCNC: 11.4 G/DL (ref 12–15.9)
LYMPHOCYTES # BLD AUTO: 2.85 10*3/MM3 (ref 0.7–3.1)
LYMPHOCYTES NFR BLD AUTO: 64.6 % (ref 19.6–45.3)
MCH RBC QN AUTO: 32.9 PG (ref 26.6–33)
MCHC RBC AUTO-ENTMCNC: 32.6 G/DL (ref 31.5–35.7)
MCV RBC AUTO: 100.9 FL (ref 79–97)
MONOCYTES # BLD AUTO: 0.09 10*3/MM3 (ref 0.1–0.9)
MONOCYTES NFR BLD AUTO: 2 % (ref 5–12)
NEUTROPHILS NFR BLD AUTO: 1.44 10*3/MM3 (ref 1.7–7)
NEUTROPHILS NFR BLD AUTO: 32.7 % (ref 42.7–76)
PLATELET # BLD AUTO: 479 10*3/MM3 (ref 140–450)
PMV BLD AUTO: 10 FL (ref 6–12)
RBC # BLD AUTO: 3.47 10*6/MM3 (ref 3.77–5.28)
WBC NRBC COR # BLD AUTO: 4.41 10*3/MM3 (ref 3.4–10.8)

## 2025-01-20 PROCEDURE — 25810000003 SODIUM CHLORIDE 0.9 % SOLUTION: Performed by: INTERNAL MEDICINE

## 2025-01-20 PROCEDURE — 25010000002 HEPARIN LOCK FLUSH PER 10 UNITS: Performed by: INTERNAL MEDICINE

## 2025-01-20 PROCEDURE — 96360 HYDRATION IV INFUSION INIT: CPT

## 2025-01-20 PROCEDURE — 85025 COMPLETE CBC W/AUTO DIFF WBC: CPT | Performed by: INTERNAL MEDICINE

## 2025-01-20 RX ORDER — HEPARIN SODIUM (PORCINE) LOCK FLUSH IV SOLN 100 UNIT/ML 100 UNIT/ML
500 SOLUTION INTRAVENOUS AS NEEDED
Status: CANCELLED | OUTPATIENT
Start: 2025-01-20

## 2025-01-20 RX ORDER — SODIUM CHLORIDE 0.9 % (FLUSH) 0.9 %
20 SYRINGE (ML) INJECTION AS NEEDED
Status: CANCELLED | OUTPATIENT
Start: 2025-01-20

## 2025-01-20 RX ORDER — SODIUM CHLORIDE 0.9 % (FLUSH) 0.9 %
20 SYRINGE (ML) INJECTION AS NEEDED
Status: DISCONTINUED | OUTPATIENT
Start: 2025-01-20 | End: 2025-01-21 | Stop reason: HOSPADM

## 2025-01-20 RX ORDER — HEPARIN SODIUM (PORCINE) LOCK FLUSH IV SOLN 100 UNIT/ML 100 UNIT/ML
500 SOLUTION INTRAVENOUS AS NEEDED
Status: DISCONTINUED | OUTPATIENT
Start: 2025-01-20 | End: 2025-01-21 | Stop reason: HOSPADM

## 2025-01-20 RX ADMIN — Medication 20 ML: at 14:47

## 2025-01-20 RX ADMIN — SODIUM CHLORIDE 1000 ML: 9 INJECTION, SOLUTION INTRAVENOUS at 13:38

## 2025-01-20 RX ADMIN — Medication 300 UNITS: at 14:47

## 2025-01-20 NOTE — PROGRESS NOTES
Pt received IV fluids as planned,1000ml of NS. Pt stated feeling better after receiving fluids, pt had complaints of a clear runny nose and nasal congestion denying fevers, and using Claritin starting 1/18 . Pt states nausea is constant and has had loose bowel movements, controlled with meds. CBC and extra tubes drawn for MD follow up.Pt has follow up appt. with  Wednesday 1/22.

## 2025-01-22 ENCOUNTER — OFFICE VISIT (OUTPATIENT)
Dept: ONCOLOGY | Facility: CLINIC | Age: 45
End: 2025-01-22
Payer: COMMERCIAL

## 2025-01-22 ENCOUNTER — HOSPITAL ENCOUNTER (OUTPATIENT)
Dept: ONCOLOGY | Facility: HOSPITAL | Age: 45
Discharge: HOME | End: 2025-01-22
Payer: COMMERCIAL

## 2025-01-22 ENCOUNTER — APPOINTMENT (OUTPATIENT)
Dept: LAB | Facility: HOSPITAL | Age: 45
End: 2025-01-22
Payer: COMMERCIAL

## 2025-01-22 VITALS
HEART RATE: 103 BPM | BODY MASS INDEX: 43.94 KG/M2 | DIASTOLIC BLOOD PRESSURE: 77 MMHG | RESPIRATION RATE: 18 BRPM | OXYGEN SATURATION: 97 % | HEIGHT: 63 IN | TEMPERATURE: 97.3 F | SYSTOLIC BLOOD PRESSURE: 111 MMHG | WEIGHT: 248 LBS

## 2025-01-22 DIAGNOSIS — Z17.0 BREAST CANCER, STAGE 1, ESTROGEN RECEPTOR POSITIVE, LEFT: ICD-10-CM

## 2025-01-22 DIAGNOSIS — R42 DIZZINESS: Primary | ICD-10-CM

## 2025-01-22 DIAGNOSIS — C50.912 BREAST CANCER, STAGE 2, LEFT: ICD-10-CM

## 2025-01-22 DIAGNOSIS — Z17.0 BREAST CANCER, STAGE 1, ESTROGEN RECEPTOR POSITIVE, LEFT: Primary | ICD-10-CM

## 2025-01-22 DIAGNOSIS — C50.912 BREAST CANCER, STAGE 1, ESTROGEN RECEPTOR POSITIVE, LEFT: Primary | ICD-10-CM

## 2025-01-22 DIAGNOSIS — C50.912 BREAST CANCER, STAGE 1, ESTROGEN RECEPTOR POSITIVE, LEFT: ICD-10-CM

## 2025-01-22 PROCEDURE — 25810000003 SODIUM CHLORIDE 0.9 % SOLUTION: Performed by: INTERNAL MEDICINE

## 2025-01-22 PROCEDURE — 96360 HYDRATION IV INFUSION INIT: CPT

## 2025-01-22 PROCEDURE — 25010000002 HEPARIN LOCK FLUSH PER 10 UNITS: Performed by: INTERNAL MEDICINE

## 2025-01-22 RX ORDER — SODIUM CHLORIDE 0.9 % (FLUSH) 0.9 %
20 SYRINGE (ML) INJECTION AS NEEDED
Status: CANCELLED | OUTPATIENT
Start: 2025-01-22

## 2025-01-22 RX ORDER — HEPARIN SODIUM (PORCINE) LOCK FLUSH IV SOLN 100 UNIT/ML 100 UNIT/ML
500 SOLUTION INTRAVENOUS AS NEEDED
Status: CANCELLED | OUTPATIENT
Start: 2025-01-22

## 2025-01-22 RX ORDER — HEPARIN SODIUM (PORCINE) LOCK FLUSH IV SOLN 100 UNIT/ML 100 UNIT/ML
500 SOLUTION INTRAVENOUS AS NEEDED
Status: DISCONTINUED | OUTPATIENT
Start: 2025-01-22 | End: 2025-01-23 | Stop reason: HOSPADM

## 2025-01-22 RX ORDER — SODIUM CHLORIDE 0.9 % (FLUSH) 0.9 %
20 SYRINGE (ML) INJECTION AS NEEDED
Status: DISCONTINUED | OUTPATIENT
Start: 2025-01-22 | End: 2025-01-23 | Stop reason: HOSPADM

## 2025-01-22 RX ADMIN — Medication 20 ML: at 15:40

## 2025-01-22 RX ADMIN — Medication 300 UNITS: at 15:40

## 2025-01-22 RX ADMIN — SODIUM CHLORIDE 1000 ML: 9 INJECTION, SOLUTION INTRAVENOUS at 14:31
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
## 2025-01-22 NOTE — PROGRESS NOTES
Pt. Is here at clinic for MD f/u appt. And IVF's,   Pt. Was brought back to infusion area after her f/u appt. With Dr. Saldivar.   Pt. Has no complaints today and no lab draw due at this time.   IVF's given as ordered and pt. Tolerated well.   Pt. Discharged from clinic with no complaints, no AVS given today.

## 2025-01-24 ENCOUNTER — HOSPITAL ENCOUNTER (OUTPATIENT)
Dept: ONCOLOGY | Facility: HOSPITAL | Age: 45
Discharge: HOME | End: 2025-01-24
Payer: COMMERCIAL

## 2025-01-24 VITALS
RESPIRATION RATE: 16 BRPM | SYSTOLIC BLOOD PRESSURE: 130 MMHG | HEIGHT: 63 IN | OXYGEN SATURATION: 97 % | TEMPERATURE: 98 F | BODY MASS INDEX: 43.77 KG/M2 | DIASTOLIC BLOOD PRESSURE: 78 MMHG | HEART RATE: 97 BPM | WEIGHT: 247 LBS

## 2025-01-24 DIAGNOSIS — C50.912 BREAST CANCER, STAGE 1, ESTROGEN RECEPTOR POSITIVE, LEFT: ICD-10-CM

## 2025-01-24 DIAGNOSIS — R42 DIZZINESS: Primary | ICD-10-CM

## 2025-01-24 DIAGNOSIS — Z17.0 BREAST CANCER, STAGE 1, ESTROGEN RECEPTOR POSITIVE, LEFT: ICD-10-CM

## 2025-01-24 PROCEDURE — 25010000002 HEPARIN LOCK FLUSH PER 10 UNITS: Performed by: INTERNAL MEDICINE

## 2025-01-24 PROCEDURE — 96360 HYDRATION IV INFUSION INIT: CPT

## 2025-01-24 PROCEDURE — 25810000003 SODIUM CHLORIDE 0.9 % SOLUTION: Performed by: INTERNAL MEDICINE

## 2025-01-24 RX ORDER — HEPARIN SODIUM (PORCINE) LOCK FLUSH IV SOLN 100 UNIT/ML 100 UNIT/ML
500 SOLUTION INTRAVENOUS AS NEEDED
Status: CANCELLED | OUTPATIENT
Start: 2025-01-24

## 2025-01-24 RX ORDER — SODIUM CHLORIDE 0.9 % (FLUSH) 0.9 %
20 SYRINGE (ML) INJECTION AS NEEDED
Status: CANCELLED | OUTPATIENT
Start: 2025-01-24

## 2025-01-24 RX ORDER — SODIUM CHLORIDE 0.9 % (FLUSH) 0.9 %
20 SYRINGE (ML) INJECTION AS NEEDED
Status: DISCONTINUED | OUTPATIENT
Start: 2025-01-24 | End: 2025-01-25 | Stop reason: HOSPADM

## 2025-01-24 RX ORDER — HEPARIN SODIUM (PORCINE) LOCK FLUSH IV SOLN 100 UNIT/ML 100 UNIT/ML
500 SOLUTION INTRAVENOUS AS NEEDED
Status: DISCONTINUED | OUTPATIENT
Start: 2025-01-24 | End: 2025-01-25 | Stop reason: HOSPADM

## 2025-01-24 RX ADMIN — SODIUM CHLORIDE 1000 ML: 9 INJECTION, SOLUTION INTRAVENOUS at 13:16

## 2025-01-24 RX ADMIN — Medication 300 UNITS: at 14:26

## 2025-01-24 RX ADMIN — Medication 20 ML: at 14:26

## 2025-01-27 ENCOUNTER — HOSPITAL ENCOUNTER (OUTPATIENT)
Dept: ONCOLOGY | Facility: HOSPITAL | Age: 45
Discharge: HOME | End: 2025-01-27
Admitting: INTERNAL MEDICINE
Payer: COMMERCIAL

## 2025-01-27 VITALS
BODY MASS INDEX: 43.77 KG/M2 | HEIGHT: 63 IN | TEMPERATURE: 97.3 F | HEART RATE: 100 BPM | DIASTOLIC BLOOD PRESSURE: 92 MMHG | WEIGHT: 247 LBS | OXYGEN SATURATION: 96 % | SYSTOLIC BLOOD PRESSURE: 128 MMHG | RESPIRATION RATE: 16 BRPM

## 2025-01-27 DIAGNOSIS — C50.912 BREAST CANCER, STAGE 2, LEFT: ICD-10-CM

## 2025-01-27 DIAGNOSIS — C50.912 BREAST CANCER, STAGE 1, ESTROGEN RECEPTOR POSITIVE, LEFT: ICD-10-CM

## 2025-01-27 DIAGNOSIS — R42 DIZZINESS: Primary | ICD-10-CM

## 2025-01-27 DIAGNOSIS — Z17.0 BREAST CANCER, STAGE 1, ESTROGEN RECEPTOR POSITIVE, LEFT: ICD-10-CM

## 2025-01-27 LAB
BASOPHILS # BLD AUTO: 0.01 10*3/MM3 (ref 0–0.2)
BASOPHILS NFR BLD AUTO: 0.2 % (ref 0–1.5)
DEPRECATED RDW RBC AUTO: 57.6 FL (ref 37–54)
EOSINOPHIL # BLD AUTO: 0 10*3/MM3 (ref 0–0.4)
EOSINOPHIL NFR BLD AUTO: 0 % (ref 0.3–6.2)
ERYTHROCYTE [DISTWIDTH] IN BLOOD BY AUTOMATED COUNT: 16.5 % (ref 12.3–15.4)
HCT VFR BLD AUTO: 32.6 % (ref 34–46.6)
HGB BLD-MCNC: 10.6 G/DL (ref 12–15.9)
LYMPHOCYTES # BLD AUTO: 2.82 10*3/MM3 (ref 0.7–3.1)
LYMPHOCYTES NFR BLD AUTO: 63.4 % (ref 19.6–45.3)
MCH RBC QN AUTO: 33.1 PG (ref 26.6–33)
MCHC RBC AUTO-ENTMCNC: 32.5 G/DL (ref 31.5–35.7)
MCV RBC AUTO: 101.9 FL (ref 79–97)
MONOCYTES # BLD AUTO: 0.53 10*3/MM3 (ref 0.1–0.9)
MONOCYTES NFR BLD AUTO: 11.9 % (ref 5–12)
NEUTROPHILS NFR BLD AUTO: 1.09 10*3/MM3 (ref 1.7–7)
NEUTROPHILS NFR BLD AUTO: 24.5 % (ref 42.7–76)
PLATELET # BLD AUTO: 90 10*3/MM3 (ref 140–450)
PMV BLD AUTO: 10 FL (ref 6–12)
RBC # BLD AUTO: 3.2 10*6/MM3 (ref 3.77–5.28)
WBC NRBC COR # BLD AUTO: 4.45 10*3/MM3 (ref 3.4–10.8)

## 2025-01-27 PROCEDURE — 96360 HYDRATION IV INFUSION INIT: CPT

## 2025-01-27 PROCEDURE — 25010000002 HEPARIN LOCK FLUSH PER 10 UNITS: Performed by: INTERNAL MEDICINE

## 2025-01-27 PROCEDURE — 85025 COMPLETE CBC W/AUTO DIFF WBC: CPT | Performed by: INTERNAL MEDICINE

## 2025-01-27 PROCEDURE — 25810000003 SODIUM CHLORIDE 0.9 % SOLUTION: Performed by: INTERNAL MEDICINE

## 2025-01-27 RX ORDER — SODIUM CHLORIDE 0.9 % (FLUSH) 0.9 %
20 SYRINGE (ML) INJECTION AS NEEDED
Status: CANCELLED | OUTPATIENT
Start: 2025-01-27

## 2025-01-27 RX ORDER — HEPARIN SODIUM (PORCINE) LOCK FLUSH IV SOLN 100 UNIT/ML 100 UNIT/ML
500 SOLUTION INTRAVENOUS AS NEEDED
Status: CANCELLED | OUTPATIENT
Start: 2025-01-27

## 2025-01-27 RX ORDER — HEPARIN SODIUM (PORCINE) LOCK FLUSH IV SOLN 100 UNIT/ML 100 UNIT/ML
500 SOLUTION INTRAVENOUS AS NEEDED
Status: DISCONTINUED | OUTPATIENT
Start: 2025-01-27 | End: 2025-01-28 | Stop reason: HOSPADM

## 2025-01-27 RX ORDER — SODIUM CHLORIDE 0.9 % (FLUSH) 0.9 %
20 SYRINGE (ML) INJECTION AS NEEDED
Status: DISCONTINUED | OUTPATIENT
Start: 2025-01-27 | End: 2025-01-28 | Stop reason: HOSPADM

## 2025-01-27 RX ADMIN — SODIUM CHLORIDE 1000 ML: 9 INJECTION, SOLUTION INTRAVENOUS at 13:16

## 2025-01-27 RX ADMIN — HEPARIN 500 UNITS: 100 SYRINGE at 14:22

## 2025-01-27 RX ADMIN — Medication 20 ML: at 14:22

## 2025-01-27 NOTE — PROGRESS NOTES
Pt here for IVF's. Patient stated she is still having diarrhea/soft stool-was a bit worse over the weekend per pt she thinks due to her trying to eat more and is becoming more soft stools today. Patient stated she did get muscle spasms and when she called they told her to contact her PMD. Patient stated she did have a nose bleed last night that lasted a bit but pt is starting a new humidifier tonight and is getting saline nasal spray to see if it helps (she stated she had two very small nosebleeds the past 2 weeks. Patient stated she is getting headaches but does have a history of migraines. Patient stated she does usually feel better after the fluids. Port accessed and flushed with good blood return noted. 10cc of blood wasted prior to specimen collection. Blood specimen obtained and sent to lab for processing per protocol.  Port flushed with saline and heparin prior to needle removal. Patient tolerated treatment and was discharged with AVS and told to call if her nosebleeds or headaches get any worse-patient is back on Wednesday.

## 2025-01-29 ENCOUNTER — HOSPITAL ENCOUNTER (OUTPATIENT)
Dept: ONCOLOGY | Facility: HOSPITAL | Age: 45
Discharge: HOME | End: 2025-01-29
Admitting: INTERNAL MEDICINE
Payer: COMMERCIAL

## 2025-01-29 VITALS
HEART RATE: 104 BPM | BODY MASS INDEX: 44.05 KG/M2 | OXYGEN SATURATION: 95 % | SYSTOLIC BLOOD PRESSURE: 115 MMHG | TEMPERATURE: 97.5 F | HEIGHT: 63 IN | RESPIRATION RATE: 16 BRPM | DIASTOLIC BLOOD PRESSURE: 75 MMHG | WEIGHT: 248.6 LBS

## 2025-01-29 DIAGNOSIS — C50.912 BREAST CANCER, STAGE 1, ESTROGEN RECEPTOR POSITIVE, LEFT: ICD-10-CM

## 2025-01-29 DIAGNOSIS — Z17.0 BREAST CANCER, STAGE 1, ESTROGEN RECEPTOR POSITIVE, LEFT: ICD-10-CM

## 2025-01-29 DIAGNOSIS — R42 DIZZINESS: Primary | ICD-10-CM

## 2025-01-29 PROCEDURE — 25010000002 HEPARIN LOCK FLUSH PER 10 UNITS: Performed by: INTERNAL MEDICINE

## 2025-01-29 PROCEDURE — 25810000003 SODIUM CHLORIDE 0.9 % SOLUTION: Performed by: INTERNAL MEDICINE

## 2025-01-29 PROCEDURE — 96360 HYDRATION IV INFUSION INIT: CPT

## 2025-01-29 RX ORDER — HEPARIN SODIUM (PORCINE) LOCK FLUSH IV SOLN 100 UNIT/ML 100 UNIT/ML
500 SOLUTION INTRAVENOUS AS NEEDED
Status: CANCELLED | OUTPATIENT
Start: 2025-01-29

## 2025-01-29 RX ORDER — SODIUM CHLORIDE 0.9 % (FLUSH) 0.9 %
20 SYRINGE (ML) INJECTION AS NEEDED
Status: CANCELLED | OUTPATIENT
Start: 2025-01-29

## 2025-01-29 RX ORDER — HEPARIN SODIUM (PORCINE) LOCK FLUSH IV SOLN 100 UNIT/ML 100 UNIT/ML
500 SOLUTION INTRAVENOUS AS NEEDED
Status: DISCONTINUED | OUTPATIENT
Start: 2025-01-29 | End: 2025-01-30 | Stop reason: HOSPADM

## 2025-01-29 RX ORDER — SODIUM CHLORIDE 0.9 % (FLUSH) 0.9 %
20 SYRINGE (ML) INJECTION AS NEEDED
Status: DISCONTINUED | OUTPATIENT
Start: 2025-01-29 | End: 2025-01-30 | Stop reason: HOSPADM

## 2025-01-29 RX ADMIN — Medication 500 UNITS: at 16:19

## 2025-01-29 RX ADMIN — SODIUM CHLORIDE 1000 ML: 9 INJECTION, SOLUTION INTRAVENOUS at 15:17

## 2025-01-29 RX ADMIN — Medication 20 ML: at 16:19

## 2025-01-31 ENCOUNTER — HOSPITAL ENCOUNTER (OUTPATIENT)
Dept: ONCOLOGY | Facility: HOSPITAL | Age: 45
Discharge: HOME | End: 2025-01-31
Payer: COMMERCIAL

## 2025-01-31 VITALS
SYSTOLIC BLOOD PRESSURE: 121 MMHG | TEMPERATURE: 97.8 F | HEART RATE: 105 BPM | HEIGHT: 63 IN | BODY MASS INDEX: 44.4 KG/M2 | RESPIRATION RATE: 14 BRPM | WEIGHT: 250.6 LBS | DIASTOLIC BLOOD PRESSURE: 80 MMHG | OXYGEN SATURATION: 96 %

## 2025-01-31 DIAGNOSIS — R42 DIZZINESS: Primary | ICD-10-CM

## 2025-01-31 DIAGNOSIS — C50.912 BREAST CANCER, STAGE 1, ESTROGEN RECEPTOR POSITIVE, LEFT: ICD-10-CM

## 2025-01-31 DIAGNOSIS — Z17.0 BREAST CANCER, STAGE 1, ESTROGEN RECEPTOR POSITIVE, LEFT: ICD-10-CM

## 2025-01-31 PROCEDURE — 96360 HYDRATION IV INFUSION INIT: CPT

## 2025-01-31 PROCEDURE — 25010000002 HEPARIN LOCK FLUSH PER 10 UNITS: Performed by: INTERNAL MEDICINE

## 2025-01-31 PROCEDURE — 25810000003 SODIUM CHLORIDE 0.9 % SOLUTION: Performed by: INTERNAL MEDICINE

## 2025-01-31 RX ORDER — SODIUM CHLORIDE 0.9 % (FLUSH) 0.9 %
20 SYRINGE (ML) INJECTION AS NEEDED
Status: DISCONTINUED | OUTPATIENT
Start: 2025-01-31 | End: 2025-02-01 | Stop reason: HOSPADM

## 2025-01-31 RX ORDER — SODIUM CHLORIDE 0.9 % (FLUSH) 0.9 %
20 SYRINGE (ML) INJECTION AS NEEDED
Status: CANCELLED | OUTPATIENT
Start: 2025-01-31

## 2025-01-31 RX ORDER — HEPARIN SODIUM (PORCINE) LOCK FLUSH IV SOLN 100 UNIT/ML 100 UNIT/ML
500 SOLUTION INTRAVENOUS AS NEEDED
Status: CANCELLED | OUTPATIENT
Start: 2025-01-31

## 2025-01-31 RX ORDER — HEPARIN SODIUM (PORCINE) LOCK FLUSH IV SOLN 100 UNIT/ML 100 UNIT/ML
500 SOLUTION INTRAVENOUS AS NEEDED
Status: DISCONTINUED | OUTPATIENT
Start: 2025-01-31 | End: 2025-02-01 | Stop reason: HOSPADM

## 2025-01-31 RX ADMIN — HEPARIN 500 UNITS: 100 SYRINGE at 14:20

## 2025-01-31 RX ADMIN — SODIUM CHLORIDE 1000 ML: 9 INJECTION, SOLUTION INTRAVENOUS at 13:13

## 2025-01-31 RX ADMIN — Medication 20 ML: at 14:20

## 2025-02-03 ENCOUNTER — HOSPITAL ENCOUNTER (OUTPATIENT)
Dept: ONCOLOGY | Facility: HOSPITAL | Age: 45
Discharge: HOME | End: 2025-02-03
Admitting: INTERNAL MEDICINE
Payer: COMMERCIAL

## 2025-02-03 VITALS
OXYGEN SATURATION: 96 % | WEIGHT: 250 LBS | SYSTOLIC BLOOD PRESSURE: 117 MMHG | DIASTOLIC BLOOD PRESSURE: 73 MMHG | BODY MASS INDEX: 44.3 KG/M2 | HEART RATE: 103 BPM | TEMPERATURE: 97.3 F | HEIGHT: 63 IN | RESPIRATION RATE: 16 BRPM

## 2025-02-03 DIAGNOSIS — K76.0 HEPATIC STEATOSIS: ICD-10-CM

## 2025-02-03 DIAGNOSIS — C50.912 BREAST CANCER, STAGE 1, ESTROGEN RECEPTOR POSITIVE, LEFT: Primary | ICD-10-CM

## 2025-02-03 DIAGNOSIS — Z51.81 ENCOUNTER FOR MONITORING CARDIOTOXIC DRUG THERAPY: ICD-10-CM

## 2025-02-03 DIAGNOSIS — Z79.899 ENCOUNTER FOR MONITORING CARDIOTOXIC DRUG THERAPY: ICD-10-CM

## 2025-02-03 DIAGNOSIS — Z17.0 BREAST CANCER, STAGE 1, ESTROGEN RECEPTOR POSITIVE, LEFT: Primary | ICD-10-CM

## 2025-02-03 DIAGNOSIS — C50.912 BREAST CANCER, STAGE 2, LEFT: ICD-10-CM

## 2025-02-03 DIAGNOSIS — R19.7 DIARRHEA, UNSPECIFIED TYPE: ICD-10-CM

## 2025-02-03 DIAGNOSIS — R42 DIZZINESS: ICD-10-CM

## 2025-02-03 LAB
ADV 40+41 DNA STL QL NAA+NON-PROBE: NOT DETECTED
ALP BLD-CCNC: 53 U/L (ref 42–141)
ASTRO TYP 1-8 RNA STL QL NAA+NON-PROBE: NOT DETECTED
BASOPHILS # BLD AUTO: 0.08 10*3/MM3 (ref 0–0.2)
BASOPHILS NFR BLD AUTO: 0.4 % (ref 0–1.5)
BUN BLDA-MCNC: 10 MG/DL (ref 7–22)
C CAYETANENSIS DNA STL QL NAA+NON-PROBE: NOT DETECTED
C COLI+JEJ+UPSA DNA STL QL NAA+NON-PROBE: NOT DETECTED
CALCIUM BLD QL: 9.6 MG/DL (ref 8–10.3)
CHLORIDE BLDA-SCNC: 106 MMOL/L (ref 98–108)
CO2 BLDA-SCNC: 27 MMOL/L (ref 18–33)
CREAT BLDA-MCNC: 1 MG/DL (ref 0.6–1.2)
CRYPTOSP DNA STL QL NAA+NON-PROBE: NOT DETECTED
DEPRECATED RDW RBC AUTO: 62.6 FL (ref 37–54)
E HISTOLYT DNA STL QL NAA+NON-PROBE: NOT DETECTED
EAEC PAA PLAS AGGR+AATA ST NAA+NON-PRB: NOT DETECTED
EC STX1+STX2 GENES STL QL NAA+NON-PROBE: NOT DETECTED
EGFRCR SERPLBLD CKD-EPI 2021: 71.8 ML/MIN/1.73
EOSINOPHIL # BLD AUTO: 0 10*3/MM3 (ref 0–0.4)
EOSINOPHIL NFR BLD AUTO: 0 % (ref 0.3–6.2)
EPEC EAE GENE STL QL NAA+NON-PROBE: NOT DETECTED
ERYTHROCYTE [DISTWIDTH] IN BLOOD BY AUTOMATED COUNT: 17.9 % (ref 12.3–15.4)
ETEC LTA+ST1A+ST1B TOX ST NAA+NON-PROBE: NOT DETECTED
G LAMBLIA DNA STL QL NAA+NON-PROBE: NOT DETECTED
GLUCOSE BLDC GLUCOMTR-MCNC: 162 MG/DL (ref 73–118)
HCT VFR BLD AUTO: 34 % (ref 34–46.6)
HGB BLD-MCNC: 11.1 G/DL (ref 12–15.9)
LACTOFERRIN STL QL LA: POSITIVE
LYMPHOCYTES # BLD AUTO: 3.22 10*3/MM3 (ref 0.7–3.1)
LYMPHOCYTES NFR BLD AUTO: 17 % (ref 19.6–45.3)
MCH RBC QN AUTO: 33.6 PG (ref 26.6–33)
MCHC RBC AUTO-ENTMCNC: 32.6 G/DL (ref 31.5–35.7)
MCV RBC AUTO: 103 FL (ref 79–97)
MONOCYTES # BLD AUTO: 0.46 10*3/MM3 (ref 0.1–0.9)
MONOCYTES NFR BLD AUTO: 2.4 % (ref 5–12)
NEUTROPHILS NFR BLD AUTO: 15.17 10*3/MM3 (ref 1.7–7)
NEUTROPHILS NFR BLD AUTO: 80.2 % (ref 42.7–76)
NOROVIRUS GI+II RNA STL QL NAA+NON-PROBE: NOT DETECTED
P SHIGELLOIDES DNA STL QL NAA+NON-PROBE: NOT DETECTED
PLATELET # BLD AUTO: 355 10*3/MM3 (ref 140–450)
PMV BLD AUTO: 10 FL (ref 6–12)
POC ALBUMIN: 3.5 G/L (ref 3.3–5.5)
POC ALT (SGPT): 24 U/L (ref 10–47)
POC AST (SGOT): 23 U/L (ref 11–38)
POC TOTAL BILIRUBIN: 0.5 MG/DL (ref 0.2–1.6)
POC TOTAL PROTEIN: 6.9 G/DL (ref 6.4–8.1)
POTASSIUM BLDA-SCNC: 3.5 MMOL/L (ref 3.6–5.1)
RBC # BLD AUTO: 3.3 10*6/MM3 (ref 3.77–5.28)
RVA RNA STL QL NAA+NON-PROBE: NOT DETECTED
S ENT+BONG DNA STL QL NAA+NON-PROBE: NOT DETECTED
SAPO I+II+IV+V RNA STL QL NAA+NON-PROBE: NOT DETECTED
SHIGELLA SP+EIEC IPAH ST NAA+NON-PROBE: NOT DETECTED
SODIUM BLD-SCNC: 140 MMOL/L (ref 128–145)
V CHOL+PARA+VUL DNA STL QL NAA+NON-PROBE: NOT DETECTED
V CHOLERAE DNA STL QL NAA+NON-PROBE: NOT DETECTED
WBC NRBC COR # BLD AUTO: 18.93 10*3/MM3 (ref 3.4–10.8)
Y ENTEROCOL DNA STL QL NAA+NON-PROBE: NOT DETECTED

## 2025-02-03 PROCEDURE — 80053 COMPREHEN METABOLIC PANEL: CPT

## 2025-02-03 PROCEDURE — 25010000002 CARBOPLATIN PER 50 MG: Performed by: NURSE PRACTITIONER

## 2025-02-03 PROCEDURE — 87507 IADNA-DNA/RNA PROBE TQ 12-25: CPT | Performed by: INTERNAL MEDICINE

## 2025-02-03 PROCEDURE — 25010000002 PALONOSETRON 0.25 MG/5ML SOLUTION PREFILLED SYRINGE: Performed by: NURSE PRACTITIONER

## 2025-02-03 PROCEDURE — 25010000002 FOSAPREPITANT PER 1 MG: Performed by: NURSE PRACTITIONER

## 2025-02-03 PROCEDURE — 96413 CHEMO IV INFUSION 1 HR: CPT

## 2025-02-03 PROCEDURE — 25810000003 SODIUM CHLORIDE 0.9 % SOLUTION 250 ML FLEX CONT: Performed by: NURSE PRACTITIONER

## 2025-02-03 PROCEDURE — 96367 TX/PROPH/DG ADDL SEQ IV INF: CPT

## 2025-02-03 PROCEDURE — 96375 TX/PRO/DX INJ NEW DRUG ADDON: CPT

## 2025-02-03 PROCEDURE — 85025 COMPLETE CBC W/AUTO DIFF WBC: CPT | Performed by: INTERNAL MEDICINE

## 2025-02-03 PROCEDURE — 83630 LACTOFERRIN FECAL (QUAL): CPT | Performed by: INTERNAL MEDICINE

## 2025-02-03 PROCEDURE — 96417 CHEMO IV INFUS EACH ADDL SEQ: CPT

## 2025-02-03 PROCEDURE — 25010000002 DOCETAXEL 20 MG/ML SOLUTION 8 ML VIAL: Performed by: NURSE PRACTITIONER

## 2025-02-03 PROCEDURE — 25010000002 TRASTUZUMAB-ANNS 420 MG RECONSTITUTED SOLUTION 1 EACH VIAL: Performed by: NURSE PRACTITIONER

## 2025-02-03 PROCEDURE — 25010000002 HEPARIN LOCK FLUSH PER 10 UNITS: Performed by: INTERNAL MEDICINE

## 2025-02-03 PROCEDURE — 25010000002 PERTUZUMAB 420 MG/14ML SOLUTION 420 MG VIAL: Performed by: NURSE PRACTITIONER

## 2025-02-03 PROCEDURE — 25810000003 SODIUM CHLORIDE 0.9 % SOLUTION: Performed by: INTERNAL MEDICINE

## 2025-02-03 RX ORDER — PALONOSETRON 0.05 MG/ML
0.25 INJECTION, SOLUTION INTRAVENOUS ONCE
Status: CANCELLED | OUTPATIENT
Start: 2025-02-03

## 2025-02-03 RX ORDER — SODIUM CHLORIDE 0.9 % (FLUSH) 0.9 %
20 SYRINGE (ML) INJECTION AS NEEDED
Status: DISCONTINUED | OUTPATIENT
Start: 2025-02-03 | End: 2025-02-04 | Stop reason: HOSPADM

## 2025-02-03 RX ORDER — HYDROCORTISONE SODIUM SUCCINATE 100 MG/2ML
100 INJECTION INTRAMUSCULAR; INTRAVENOUS AS NEEDED
Status: CANCELLED | OUTPATIENT
Start: 2025-02-03

## 2025-02-03 RX ORDER — FAMOTIDINE 10 MG/ML
20 INJECTION, SOLUTION INTRAVENOUS AS NEEDED
Status: CANCELLED | OUTPATIENT
Start: 2025-02-03

## 2025-02-03 RX ORDER — SODIUM CHLORIDE 9 MG/ML
20 INJECTION, SOLUTION INTRAVENOUS ONCE
Status: DISCONTINUED | OUTPATIENT
Start: 2025-02-03 | End: 2025-02-04 | Stop reason: HOSPADM

## 2025-02-03 RX ORDER — SODIUM CHLORIDE 0.9 % (FLUSH) 0.9 %
20 SYRINGE (ML) INJECTION AS NEEDED
Status: CANCELLED | OUTPATIENT
Start: 2025-02-03

## 2025-02-03 RX ORDER — DIPHENHYDRAMINE HYDROCHLORIDE 50 MG/ML
50 INJECTION INTRAMUSCULAR; INTRAVENOUS AS NEEDED
Status: CANCELLED | OUTPATIENT
Start: 2025-02-03

## 2025-02-03 RX ORDER — PALONOSETRON 0.05 MG/ML
0.25 INJECTION, SOLUTION INTRAVENOUS ONCE
Status: COMPLETED | OUTPATIENT
Start: 2025-02-03 | End: 2025-02-03

## 2025-02-03 RX ORDER — SODIUM CHLORIDE 9 MG/ML
20 INJECTION, SOLUTION INTRAVENOUS ONCE
Status: CANCELLED | OUTPATIENT
Start: 2025-02-03

## 2025-02-03 RX ORDER — HEPARIN SODIUM (PORCINE) LOCK FLUSH IV SOLN 100 UNIT/ML 100 UNIT/ML
500 SOLUTION INTRAVENOUS AS NEEDED
Status: CANCELLED | OUTPATIENT
Start: 2025-02-03

## 2025-02-03 RX ORDER — HEPARIN SODIUM (PORCINE) LOCK FLUSH IV SOLN 100 UNIT/ML 100 UNIT/ML
500 SOLUTION INTRAVENOUS AS NEEDED
Status: DISCONTINUED | OUTPATIENT
Start: 2025-02-03 | End: 2025-02-04 | Stop reason: HOSPADM

## 2025-02-03 RX ADMIN — FOSAPREPITANT 100 ML: 150 INJECTION, POWDER, LYOPHILIZED, FOR SOLUTION INTRAVENOUS at 12:43

## 2025-02-03 RX ADMIN — PERTUZUMAB 420 MG: 30 INJECTION, SOLUTION, CONCENTRATE INTRAVENOUS at 10:53

## 2025-02-03 RX ADMIN — PALONOSETRON 0.25 MG: 0.25 INJECTION, SOLUTION INTRAVENOUS at 12:43

## 2025-02-03 RX ADMIN — CARBOPLATIN 900 MG: 10 INJECTION, SOLUTION INTRAVENOUS at 14:21

## 2025-02-03 RX ADMIN — SODIUM CHLORIDE 1000 ML: 9 INJECTION, SOLUTION INTRAVENOUS at 12:30

## 2025-02-03 RX ADMIN — HEPARIN 500 UNITS: 100 SYRINGE at 14:56

## 2025-02-03 RX ADMIN — Medication 20 ML: at 14:56

## 2025-02-03 RX ADMIN — DOCETAXEL 155 MG: 20 INJECTION, SOLUTION, CONCENTRATE INTRAVENOUS at 13:12

## 2025-02-03 RX ADMIN — TRASTUZUMAB-ANNS 680 MG: 420 INJECTION, POWDER, LYOPHILIZED, FOR SOLUTION INTRAVENOUS at 12:01

## 2025-02-03 NOTE — PROGRESS NOTES
Pt is here for C4 D1 perjeta, kanjinti, docetaxel, carboplatin. Using sterile technique, port accessed for blood collection and treatment. 10 ml blood wasted prior to collecting blood for ordered labs. Pt denies having any new complaints. As of today, diarrhea has resolved. Treatment tolerated well today. Pt to return to center tomorrow, 2/4/25, for fulphila injection.

## 2025-02-04 ENCOUNTER — HOSPITAL ENCOUNTER (OUTPATIENT)
Dept: ONCOLOGY | Facility: HOSPITAL | Age: 45
Discharge: HOME | End: 2025-02-04
Admitting: NURSE PRACTITIONER
Payer: COMMERCIAL

## 2025-02-04 DIAGNOSIS — Z17.0 BREAST CANCER, STAGE 1, ESTROGEN RECEPTOR POSITIVE, LEFT: Primary | ICD-10-CM

## 2025-02-04 DIAGNOSIS — C50.912 BREAST CANCER, STAGE 1, ESTROGEN RECEPTOR POSITIVE, LEFT: Primary | ICD-10-CM

## 2025-02-04 LAB
O+P SPEC MICRO: NORMAL
O+P STL CONC: NORMAL

## 2025-02-04 PROCEDURE — 25010000002 PEGFILGRASTIM-JMDB 6 MG/0.6ML SOLUTION PREFILLED SYRINGE: Performed by: NURSE PRACTITIONER

## 2025-02-04 PROCEDURE — 96372 THER/PROPH/DIAG INJ SC/IM: CPT

## 2025-02-04 RX ORDER — POTASSIUM CHLORIDE 1500 MG/1
40 TABLET, EXTENDED RELEASE ORAL ONCE
Qty: 2 TABLET | Refills: 1 | Status: SHIPPED | OUTPATIENT
Start: 2025-02-04 | End: 2025-02-04

## 2025-02-04 RX ORDER — METRONIDAZOLE 500 MG/1
500 TABLET ORAL EVERY 8 HOURS
Qty: 30 TABLET | Refills: 0 | Status: ON HOLD | OUTPATIENT
Start: 2025-02-04 | End: 2025-02-14

## 2025-02-04 RX ADMIN — PEGFILGRASTIM-JMDB 6 MG: 6 INJECTION SUBCUTANEOUS at 13:24

## 2025-02-06 ENCOUNTER — HOSPITAL ENCOUNTER (OUTPATIENT)
Dept: CARDIOLOGY | Facility: HOSPITAL | Age: 45
Discharge: HOME | End: 2025-02-06
Admitting: NURSE PRACTITIONER
Payer: COMMERCIAL

## 2025-02-06 VITALS
WEIGHT: 250 LBS | DIASTOLIC BLOOD PRESSURE: 77 MMHG | HEART RATE: 115 BPM | BODY MASS INDEX: 44.3 KG/M2 | SYSTOLIC BLOOD PRESSURE: 142 MMHG | HEIGHT: 63 IN

## 2025-02-06 DIAGNOSIS — Z51.81 ENCOUNTER FOR MONITORING CARDIOTOXIC DRUG THERAPY: ICD-10-CM

## 2025-02-06 DIAGNOSIS — Z79.899 ENCOUNTER FOR MONITORING CARDIOTOXIC DRUG THERAPY: ICD-10-CM

## 2025-02-06 PROCEDURE — 93306 TTE W/DOPPLER COMPLETE: CPT

## 2025-02-06 PROCEDURE — 93356 MYOCRD STRAIN IMG SPCKL TRCK: CPT

## 2025-02-07 LAB
AORTIC DIMENSIONLESS INDEX: 0.84 (DI)
AV MEAN PRESS GRAD SYS DOP V1V2: 3.9 MMHG
AV VMAX SYS DOP: 138.4 CM/SEC
BH CV ECHO LEFT VENTRICLE GLOBAL LONGITUDINAL STRAIN: -17.4 %
BH CV ECHO MEAS - ACS: 2.07 CM
BH CV ECHO MEAS - AO MAX PG: 7.7 MMHG
BH CV ECHO MEAS - AO ROOT DIAM: 3.4 CM
BH CV ECHO MEAS - AO V2 VTI: 19.9 CM
BH CV ECHO MEAS - AVA(I,D): 3.3 CM2
BH CV ECHO MEAS - EDV(CUBED): 65.8 ML
BH CV ECHO MEAS - EDV(MOD-SP2): 98.6 ML
BH CV ECHO MEAS - EDV(MOD-SP4): 88.7 ML
BH CV ECHO MEAS - EF(MOD-SP2): 57.7 %
BH CV ECHO MEAS - EF(MOD-SP4): 63.1 %
BH CV ECHO MEAS - ESV(CUBED): 16.9 ML
BH CV ECHO MEAS - ESV(MOD-SP2): 41.7 ML
BH CV ECHO MEAS - ESV(MOD-SP4): 32.8 ML
BH CV ECHO MEAS - FS: 36.5 %
BH CV ECHO MEAS - IVS/LVPW: 1.14 CM
BH CV ECHO MEAS - IVSD: 0.99 CM
BH CV ECHO MEAS - LA DIMENSION: 4.1 CM
BH CV ECHO MEAS - LAT PEAK E' VEL: 8 CM/SEC
BH CV ECHO MEAS - LV DIASTOLIC VOL/BSA (35-75): 41.7 CM2
BH CV ECHO MEAS - LV MASS(C)D: 116.2 GRAMS
BH CV ECHO MEAS - LV MAX PG: 4 MMHG
BH CV ECHO MEAS - LV MEAN PG: 2.06 MMHG
BH CV ECHO MEAS - LV SYSTOLIC VOL/BSA (12-30): 15.4 CM2
BH CV ECHO MEAS - LV V1 MAX: 100.1 CM/SEC
BH CV ECHO MEAS - LV V1 VTI: 16.5 CM
BH CV ECHO MEAS - LVIDD: 4 CM
BH CV ECHO MEAS - LVIDS: 2.6 CM
BH CV ECHO MEAS - LVOT AREA: 4 CM2
BH CV ECHO MEAS - LVOT DIAM: 2.25 CM
BH CV ECHO MEAS - LVPWD: 0.87 CM
BH CV ECHO MEAS - MED PEAK E' VEL: 8.7 CM/SEC
BH CV ECHO MEAS - MR MAX PG: 81.2 MMHG
BH CV ECHO MEAS - MR MAX VEL: 450 CM/SEC
BH CV ECHO MEAS - MV MAX PG: 2.37 MMHG
BH CV ECHO MEAS - MV MEAN PG: 0.96 MMHG
BH CV ECHO MEAS - MV V2 VTI: 20.1 CM
BH CV ECHO MEAS - MVA(VTI): 3.3 CM2
BH CV ECHO MEAS - PA ACC TIME: 0.1 SEC
BH CV ECHO MEAS - PA V2 MAX: 106.2 CM/SEC
BH CV ECHO MEAS - PI END-D VEL: 150.5 CM/SEC
BH CV ECHO MEAS - PULM A REVS DUR: 0.1 SEC
BH CV ECHO MEAS - PULM A REVS VEL: 25.4 CM/SEC
BH CV ECHO MEAS - PULM DIAS VEL: 38 CM/SEC
BH CV ECHO MEAS - PULM S/D: 1.5
BH CV ECHO MEAS - PULM SYS VEL: 57.2 CM/SEC
BH CV ECHO MEAS - QP/QS: 0.99
BH CV ECHO MEAS - RAP SYSTOLE: 3 MMHG
BH CV ECHO MEAS - RV MAX PG: 2 MMHG
BH CV ECHO MEAS - RV V1 MAX: 70.6 CM/SEC
BH CV ECHO MEAS - RV V1 VTI: 13.2 CM
BH CV ECHO MEAS - RVDD: 3.1 CM
BH CV ECHO MEAS - RVOT DIAM: 2.5 CM
BH CV ECHO MEAS - RVSP: 22 MMHG
BH CV ECHO MEAS - SV(LVOT): 65.4 ML
BH CV ECHO MEAS - SV(MOD-SP2): 56.9 ML
BH CV ECHO MEAS - SV(MOD-SP4): 56 ML
BH CV ECHO MEAS - SV(RVOT): 65 ML
BH CV ECHO MEAS - SVI(LVOT): 30.8 ML/M2
BH CV ECHO MEAS - SVI(MOD-SP2): 26.8 ML/M2
BH CV ECHO MEAS - SVI(MOD-SP4): 26.3 ML/M2
BH CV ECHO MEAS - TAPSE (>1.6): 2.03 CM
BH CV ECHO MEAS - TR MAX PG: 18.8 MMHG
BH CV ECHO MEAS - TR MAX VEL: 216.6 CM/SEC
LV EF BIPLANE MOD: 60 %

## 2025-02-10 ENCOUNTER — OFFICE VISIT (OUTPATIENT)
Dept: ONCOLOGY | Facility: CLINIC | Age: 45
End: 2025-02-10
Payer: COMMERCIAL

## 2025-02-10 ENCOUNTER — HOSPITAL ENCOUNTER (INPATIENT)
Facility: HOSPITAL | Age: 45
LOS: 5 days | Discharge: HOME | End: 2025-02-15
Attending: STUDENT IN AN ORGANIZED HEALTH CARE EDUCATION/TRAINING PROGRAM | Admitting: INTERNAL MEDICINE
Payer: COMMERCIAL

## 2025-02-10 ENCOUNTER — HOSPITAL ENCOUNTER (OUTPATIENT)
Dept: ONCOLOGY | Facility: HOSPITAL | Age: 45
Discharge: HOME | End: 2025-02-10
Payer: COMMERCIAL

## 2025-02-10 VITALS
OXYGEN SATURATION: 99 % | DIASTOLIC BLOOD PRESSURE: 104 MMHG | TEMPERATURE: 98.6 F | RESPIRATION RATE: 20 BRPM | WEIGHT: 236 LBS | BODY MASS INDEX: 41.82 KG/M2 | HEIGHT: 63 IN | SYSTOLIC BLOOD PRESSURE: 127 MMHG | HEART RATE: 150 BPM

## 2025-02-10 DIAGNOSIS — C50.912 BREAST CANCER, STAGE 1, ESTROGEN RECEPTOR POSITIVE, LEFT: ICD-10-CM

## 2025-02-10 DIAGNOSIS — R42 DIZZINESS: Primary | ICD-10-CM

## 2025-02-10 DIAGNOSIS — C50.912 BREAST CANCER, STAGE 1, ESTROGEN RECEPTOR POSITIVE, LEFT: Primary | ICD-10-CM

## 2025-02-10 DIAGNOSIS — Z17.0 BREAST CANCER, STAGE 1, ESTROGEN RECEPTOR POSITIVE, LEFT: Primary | ICD-10-CM

## 2025-02-10 DIAGNOSIS — Z17.0 BREAST CANCER, STAGE 1, ESTROGEN RECEPTOR POSITIVE, LEFT: ICD-10-CM

## 2025-02-10 PROBLEM — R11.2 NAUSEA AND VOMITING: Status: ACTIVE | Noted: 2025-02-10

## 2025-02-10 LAB
ALBUMIN SERPL-MCNC: 4.6 G/DL (ref 3.5–5.2)
ALBUMIN/GLOB SERPL: 1.3 G/DL
ALP SERPL-CCNC: 82 U/L (ref 39–117)
ALT SERPL W P-5'-P-CCNC: 27 U/L (ref 1–33)
ANION GAP SERPL CALCULATED.3IONS-SCNC: 18.9 MMOL/L (ref 5–15)
AST SERPL-CCNC: 16 U/L (ref 1–32)
BASOPHILS # BLD AUTO: 0.02 10*3/MM3 (ref 0–0.2)
BASOPHILS NFR BLD AUTO: 0.3 % (ref 0–1.5)
BILIRUB SERPL-MCNC: 0.6 MG/DL (ref 0–1.2)
BUN SERPL-MCNC: 26 MG/DL (ref 6–20)
BUN/CREAT SERPL: 20.5 (ref 7–25)
CALCIUM SPEC-SCNC: 9.7 MG/DL (ref 8.6–10.5)
CHLORIDE SERPL-SCNC: 92 MMOL/L (ref 98–107)
CO2 SERPL-SCNC: 21.1 MMOL/L (ref 22–29)
CREAT SERPL-MCNC: 1.27 MG/DL (ref 0.57–1)
D-LACTATE SERPL-SCNC: 1.1 MMOL/L (ref 0.5–2)
DEPRECATED RDW RBC AUTO: 54.4 FL (ref 37–54)
EGFRCR SERPLBLD CKD-EPI 2021: 53.9 ML/MIN/1.73
EOSINOPHIL # BLD AUTO: 0 10*3/MM3 (ref 0–0.4)
EOSINOPHIL NFR BLD AUTO: 0 % (ref 0.3–6.2)
ERYTHROCYTE [DISTWIDTH] IN BLOOD BY AUTOMATED COUNT: 15.1 % (ref 12.3–15.4)
GEN 5 1HR TROPONIN T REFLEX: 16 NG/L
GLOBULIN UR ELPH-MCNC: 3.6 GM/DL
GLUCOSE SERPL-MCNC: 150 MG/DL (ref 65–99)
HCT VFR BLD AUTO: 38.6 % (ref 34–46.6)
HGB BLD-MCNC: 13.2 G/DL (ref 12–15.9)
LYMPHOCYTES # BLD AUTO: 3.5 10*3/MM3 (ref 0.7–3.1)
LYMPHOCYTES NFR BLD AUTO: 44.1 % (ref 19.6–45.3)
MAGNESIUM SERPL-MCNC: 2 MG/DL (ref 1.6–2.6)
MCH RBC QN AUTO: 34.1 PG (ref 26.6–33)
MCHC RBC AUTO-ENTMCNC: 34.2 G/DL (ref 31.5–35.7)
MCV RBC AUTO: 99.7 FL (ref 79–97)
MONOCYTES # BLD AUTO: 1.42 10*3/MM3 (ref 0.1–0.9)
MONOCYTES NFR BLD AUTO: 17.9 % (ref 5–12)
NEUTROPHILS NFR BLD AUTO: 2.99 10*3/MM3 (ref 1.7–7)
NEUTROPHILS NFR BLD AUTO: 37.7 % (ref 42.7–76)
PHOSPHATE SERPL-MCNC: 3.6 MG/DL (ref 2.5–4.5)
PLATELET # BLD AUTO: 215 10*3/MM3 (ref 140–450)
PMV BLD AUTO: 11.4 FL (ref 6–12)
POTASSIUM SERPL-SCNC: 3.1 MMOL/L (ref 3.5–5.2)
PROT SERPL-MCNC: 8.2 G/DL (ref 6–8.5)
RBC # BLD AUTO: 3.87 10*6/MM3 (ref 3.77–5.28)
SODIUM SERPL-SCNC: 132 MMOL/L (ref 136–145)
TROPONIN T NUMERIC DELTA: 7 NG/L
TROPONIN T SERPL HS-MCNC: 9 NG/L
TSH SERPL DL<=0.05 MIU/L-ACNC: 1.31 UIU/ML (ref 0.27–4.2)
WBC NRBC COR # BLD AUTO: 7.93 10*3/MM3 (ref 3.4–10.8)
WHOLE BLOOD HOLD SPECIMEN: NORMAL

## 2025-02-10 PROCEDURE — 25010000002 ONDANSETRON PER 1 MG: Performed by: INTERNAL MEDICINE

## 2025-02-10 PROCEDURE — 25010000002 ENOXAPARIN PER 10 MG: Performed by: INTERNAL MEDICINE

## 2025-02-10 PROCEDURE — 25810000003 SODIUM CHLORIDE 0.9 % SOLUTION: Performed by: INTERNAL MEDICINE

## 2025-02-10 PROCEDURE — 84484 ASSAY OF TROPONIN QUANT: CPT | Performed by: INTERNAL MEDICINE

## 2025-02-10 PROCEDURE — 84100 ASSAY OF PHOSPHORUS: CPT | Performed by: INTERNAL MEDICINE

## 2025-02-10 PROCEDURE — 36591 DRAW BLOOD OFF VENOUS DEVICE: CPT

## 2025-02-10 PROCEDURE — 99215 OFFICE O/P EST HI 40 MIN: CPT | Performed by: INTERNAL MEDICINE

## 2025-02-10 PROCEDURE — 83605 ASSAY OF LACTIC ACID: CPT | Performed by: INTERNAL MEDICINE

## 2025-02-10 PROCEDURE — 80050 GENERAL HEALTH PANEL: CPT | Performed by: INTERNAL MEDICINE

## 2025-02-10 PROCEDURE — 83735 ASSAY OF MAGNESIUM: CPT | Performed by: INTERNAL MEDICINE

## 2025-02-10 RX ORDER — VILAZODONE HYDROCHLORIDE 10 MG/1
20 TABLET ORAL DAILY
Status: DISCONTINUED | OUTPATIENT
Start: 2025-02-11 | End: 2025-02-15 | Stop reason: HOSPADM

## 2025-02-10 RX ORDER — BUPROPION HYDROCHLORIDE 150 MG/1
150 TABLET ORAL DAILY
Status: DISCONTINUED | OUTPATIENT
Start: 2025-02-11 | End: 2025-02-15 | Stop reason: HOSPADM

## 2025-02-10 RX ORDER — SODIUM CHLORIDE 9 MG/ML
100 INJECTION, SOLUTION INTRAVENOUS CONTINUOUS
Status: DISPENSED | OUTPATIENT
Start: 2025-02-10 | End: 2025-02-11

## 2025-02-10 RX ORDER — DIPHENOXYLATE HYDROCHLORIDE AND ATROPINE SULFATE 2.5; .025 MG/1; MG/1
1 TABLET ORAL 3 TIMES DAILY
Status: DISCONTINUED | OUTPATIENT
Start: 2025-02-10 | End: 2025-02-15 | Stop reason: HOSPADM

## 2025-02-10 RX ORDER — ENOXAPARIN SODIUM 100 MG/ML
40 INJECTION SUBCUTANEOUS EVERY 12 HOURS
Status: DISCONTINUED | OUTPATIENT
Start: 2025-02-10 | End: 2025-02-15 | Stop reason: HOSPADM

## 2025-02-10 RX ORDER — PROPRANOLOL HYDROCHLORIDE 10 MG/1
10 TABLET ORAL 3 TIMES DAILY PRN
Status: DISCONTINUED | OUTPATIENT
Start: 2025-02-10 | End: 2025-02-15 | Stop reason: HOSPADM

## 2025-02-10 RX ORDER — PANTOPRAZOLE SODIUM 40 MG/1
40 TABLET, DELAYED RELEASE ORAL DAILY
Status: DISCONTINUED | OUTPATIENT
Start: 2025-02-11 | End: 2025-02-10

## 2025-02-10 RX ORDER — SODIUM CHLORIDE 0.9 % (FLUSH) 0.9 %
20 SYRINGE (ML) INJECTION AS NEEDED
Status: DISCONTINUED | OUTPATIENT
Start: 2025-02-10 | End: 2025-02-11 | Stop reason: HOSPADM

## 2025-02-10 RX ORDER — POLYETHYLENE GLYCOL 3350 17 G/17G
17 POWDER, FOR SOLUTION ORAL DAILY PRN
Status: DISCONTINUED | OUTPATIENT
Start: 2025-02-10 | End: 2025-02-15 | Stop reason: HOSPADM

## 2025-02-10 RX ORDER — SODIUM CHLORIDE 0.9 % (FLUSH) 0.9 %
20 SYRINGE (ML) INJECTION AS NEEDED
OUTPATIENT
Start: 2025-02-10

## 2025-02-10 RX ORDER — ONDANSETRON 2 MG/ML
4 INJECTION INTRAMUSCULAR; INTRAVENOUS EVERY 6 HOURS PRN
Status: DISCONTINUED | OUTPATIENT
Start: 2025-02-10 | End: 2025-02-15 | Stop reason: HOSPADM

## 2025-02-10 RX ORDER — BISACODYL 5 MG/1
5 TABLET, DELAYED RELEASE ORAL DAILY PRN
Status: DISCONTINUED | OUTPATIENT
Start: 2025-02-10 | End: 2025-02-15 | Stop reason: HOSPADM

## 2025-02-10 RX ORDER — OLANZAPINE 5 MG/1
5 TABLET ORAL NIGHTLY
Status: DISCONTINUED | OUTPATIENT
Start: 2025-02-10 | End: 2025-02-11

## 2025-02-10 RX ORDER — BISACODYL 10 MG
10 SUPPOSITORY, RECTAL RECTAL DAILY PRN
Status: DISCONTINUED | OUTPATIENT
Start: 2025-02-10 | End: 2025-02-15 | Stop reason: HOSPADM

## 2025-02-10 RX ORDER — CLOBETASOL PROPIONATE 0.5 MG/G
OINTMENT TOPICAL EVERY 12 HOURS SCHEDULED
Status: DISCONTINUED | OUTPATIENT
Start: 2025-02-10 | End: 2025-02-15 | Stop reason: HOSPADM

## 2025-02-10 RX ORDER — OXYCODONE HYDROCHLORIDE 5 MG/1
5 TABLET ORAL EVERY 6 HOURS PRN
Status: DISCONTINUED | OUTPATIENT
Start: 2025-02-10 | End: 2025-02-14

## 2025-02-10 RX ORDER — CALCIUM CARBONATE 500 MG/1
2 TABLET, CHEWABLE ORAL 3 TIMES DAILY PRN
Status: DISCONTINUED | OUTPATIENT
Start: 2025-02-10 | End: 2025-02-15 | Stop reason: HOSPADM

## 2025-02-10 RX ORDER — PANTOPRAZOLE SODIUM 40 MG/1
40 TABLET, DELAYED RELEASE ORAL
Status: DISCONTINUED | OUTPATIENT
Start: 2025-02-10 | End: 2025-02-15 | Stop reason: HOSPADM

## 2025-02-10 RX ORDER — SODIUM CHLORIDE 0.9 % (FLUSH) 0.9 %
10 SYRINGE (ML) INJECTION AS NEEDED
Status: DISCONTINUED | OUTPATIENT
Start: 2025-02-10 | End: 2025-02-15 | Stop reason: HOSPADM

## 2025-02-10 RX ORDER — HYDROMORPHONE HYDROCHLORIDE 1 MG/ML
0.5 INJECTION, SOLUTION INTRAMUSCULAR; INTRAVENOUS; SUBCUTANEOUS
Status: DISCONTINUED | OUTPATIENT
Start: 2025-02-10 | End: 2025-02-15 | Stop reason: HOSPADM

## 2025-02-10 RX ORDER — TRAZODONE HYDROCHLORIDE 50 MG/1
25 TABLET, FILM COATED ORAL ONCE
Status: COMPLETED | OUTPATIENT
Start: 2025-02-10 | End: 2025-02-10

## 2025-02-10 RX ORDER — HEPARIN SODIUM (PORCINE) LOCK FLUSH IV SOLN 100 UNIT/ML 100 UNIT/ML
500 SOLUTION INTRAVENOUS AS NEEDED
OUTPATIENT
Start: 2025-02-10

## 2025-02-10 RX ORDER — AMOXICILLIN 250 MG
2 CAPSULE ORAL 2 TIMES DAILY PRN
Status: DISCONTINUED | OUTPATIENT
Start: 2025-02-10 | End: 2025-02-15 | Stop reason: HOSPADM

## 2025-02-10 RX ORDER — SODIUM CHLORIDE 0.9 % (FLUSH) 0.9 %
10 SYRINGE (ML) INJECTION EVERY 12 HOURS SCHEDULED
Status: DISCONTINUED | OUTPATIENT
Start: 2025-02-10 | End: 2025-02-15 | Stop reason: HOSPADM

## 2025-02-10 RX ORDER — NALOXONE HCL 0.4 MG/ML
0.4 VIAL (ML) INJECTION
Status: DISCONTINUED | OUTPATIENT
Start: 2025-02-10 | End: 2025-02-15 | Stop reason: HOSPADM

## 2025-02-10 RX ORDER — SODIUM CHLORIDE 9 MG/ML
40 INJECTION, SOLUTION INTRAVENOUS AS NEEDED
Status: DISCONTINUED | OUTPATIENT
Start: 2025-02-10 | End: 2025-02-15 | Stop reason: HOSPADM

## 2025-02-10 RX ORDER — HEPARIN SODIUM (PORCINE) LOCK FLUSH IV SOLN 100 UNIT/ML 100 UNIT/ML
500 SOLUTION INTRAVENOUS AS NEEDED
Status: DISCONTINUED | OUTPATIENT
Start: 2025-02-10 | End: 2025-02-11 | Stop reason: HOSPADM

## 2025-02-10 RX ADMIN — ANTACID TABLETS 2 TABLET: 500 TABLET, CHEWABLE ORAL at 22:02

## 2025-02-10 RX ADMIN — SODIUM CHLORIDE 100 ML/HR: 9 INJECTION, SOLUTION INTRAVENOUS at 20:41

## 2025-02-10 RX ADMIN — PANTOPRAZOLE SODIUM 40 MG: 40 TABLET, DELAYED RELEASE ORAL at 22:02

## 2025-02-10 RX ADMIN — SODIUM CHLORIDE 2000 ML: 9 INJECTION, SOLUTION INTRAVENOUS at 20:41

## 2025-02-10 RX ADMIN — Medication 10 ML: at 20:41

## 2025-02-10 RX ADMIN — ONDANSETRON 4 MG: 2 INJECTION INTRAMUSCULAR; INTRAVENOUS at 20:40

## 2025-02-10 RX ADMIN — TRAZODONE HYDROCHLORIDE 25 MG: 50 TABLET ORAL at 22:03

## 2025-02-10 RX ADMIN — ENOXAPARIN SODIUM 40 MG: 100 INJECTION SUBCUTANEOUS at 22:02
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
## 2025-02-11 LAB
ALBUMIN SERPL-MCNC: 3.6 G/DL (ref 3.5–5.2)
ALBUMIN/GLOB SERPL: 1.2 G/DL
ALP SERPL-CCNC: 64 U/L (ref 39–117)
ALT SERPL W P-5'-P-CCNC: 19 U/L (ref 1–33)
ANION GAP SERPL CALCULATED.3IONS-SCNC: 12.5 MMOL/L (ref 5–15)
AST SERPL-CCNC: 12 U/L (ref 1–32)
BILIRUB SERPL-MCNC: 0.3 MG/DL (ref 0–1.2)
BUN SERPL-MCNC: 16 MG/DL (ref 6–20)
BUN/CREAT SERPL: 18.8 (ref 7–25)
CALCIUM SPEC-SCNC: 8.6 MG/DL (ref 8.6–10.5)
CHLORIDE SERPL-SCNC: 100 MMOL/L (ref 98–107)
CO2 SERPL-SCNC: 21.5 MMOL/L (ref 22–29)
CREAT SERPL-MCNC: 0.85 MG/DL (ref 0.57–1)
DEPRECATED RDW RBC AUTO: 56.4 FL (ref 37–54)
EGFRCR SERPLBLD CKD-EPI 2021: 87.3 ML/MIN/1.73
ERYTHROCYTE [DISTWIDTH] IN BLOOD BY AUTOMATED COUNT: 15.5 % (ref 12.3–15.4)
GLOBULIN UR ELPH-MCNC: 2.9 GM/DL
GLUCOSE SERPL-MCNC: 109 MG/DL (ref 65–99)
HCT VFR BLD AUTO: 29.2 % (ref 34–46.6)
HGB BLD-MCNC: 9.9 G/DL (ref 12–15.9)
LARGE PLATELETS: ABNORMAL
LYMPHOCYTES # BLD MANUAL: 3.26 10*3/MM3 (ref 0.7–3.1)
LYMPHOCYTES NFR BLD MANUAL: 10 % (ref 5–12)
MAGNESIUM SERPL-MCNC: 1.9 MG/DL (ref 1.6–2.6)
MCH RBC QN AUTO: 33.4 PG (ref 26.6–33)
MCHC RBC AUTO-ENTMCNC: 33.9 G/DL (ref 31.5–35.7)
MCV RBC AUTO: 98.6 FL (ref 79–97)
METAMYELOCYTES NFR BLD MANUAL: 1 % (ref 0–0)
MONOCYTES # BLD: 0.68 10*3/MM3 (ref 0.1–0.9)
NEUTROPHILS # BLD AUTO: 2.79 10*3/MM3 (ref 1.7–7)
NEUTROPHILS NFR BLD MANUAL: 36 % (ref 42.7–76)
NEUTS BAND NFR BLD MANUAL: 5 % (ref 0–5)
PHOSPHATE SERPL-MCNC: 2.5 MG/DL (ref 2.5–4.5)
PLATELET # BLD AUTO: 142 10*3/MM3 (ref 140–450)
PMV BLD AUTO: 11.1 FL (ref 6–12)
POTASSIUM SERPL-SCNC: 2.9 MMOL/L (ref 3.5–5.2)
POTASSIUM SERPL-SCNC: 3 MMOL/L (ref 3.5–5.2)
PROT SERPL-MCNC: 6.5 G/DL (ref 6–8.5)
RBC # BLD AUTO: 2.96 10*6/MM3 (ref 3.77–5.28)
RBC MORPH BLD: NORMAL
SCAN SLIDE: NORMAL
SODIUM SERPL-SCNC: 134 MMOL/L (ref 136–145)
VARIANT LYMPHS NFR BLD MANUAL: 42 % (ref 19.6–45.3)
VARIANT LYMPHS NFR BLD MANUAL: 6 % (ref 0–5)
WBC MORPH BLD: NORMAL
WBC NRBC COR # BLD AUTO: 6.8 10*3/MM3 (ref 3.4–10.8)

## 2025-02-11 PROCEDURE — 84132 ASSAY OF SERUM POTASSIUM: CPT | Performed by: INTERNAL MEDICINE

## 2025-02-11 PROCEDURE — 25810000003 SODIUM CHLORIDE 0.9 % SOLUTION: Performed by: INTERNAL MEDICINE

## 2025-02-11 PROCEDURE — 83735 ASSAY OF MAGNESIUM: CPT | Performed by: INTERNAL MEDICINE

## 2025-02-11 PROCEDURE — 80053 COMPREHEN METABOLIC PANEL: CPT | Performed by: INTERNAL MEDICINE

## 2025-02-11 PROCEDURE — 25010000002 ONDANSETRON PER 1 MG: Performed by: INTERNAL MEDICINE

## 2025-02-11 PROCEDURE — 85007 BL SMEAR W/DIFF WBC COUNT: CPT | Performed by: INTERNAL MEDICINE

## 2025-02-11 PROCEDURE — 25010000002 ENOXAPARIN PER 10 MG: Performed by: INTERNAL MEDICINE

## 2025-02-11 PROCEDURE — 85025 COMPLETE CBC W/AUTO DIFF WBC: CPT | Performed by: INTERNAL MEDICINE

## 2025-02-11 PROCEDURE — 84100 ASSAY OF PHOSPHORUS: CPT | Performed by: INTERNAL MEDICINE

## 2025-02-11 RX ORDER — POTASSIUM CHLORIDE 1500 MG/1
40 TABLET, EXTENDED RELEASE ORAL EVERY 4 HOURS
Status: COMPLETED | OUTPATIENT
Start: 2025-02-11 | End: 2025-02-11

## 2025-02-11 RX ORDER — POTASSIUM CHLORIDE 1500 MG/1
TABLET, EXTENDED RELEASE ORAL
Status: COMPLETED
Start: 2025-02-11 | End: 2025-02-11

## 2025-02-11 RX ORDER — ACETAMINOPHEN 325 MG/1
650 TABLET ORAL EVERY 6 HOURS PRN
Status: DISCONTINUED | OUTPATIENT
Start: 2025-02-11 | End: 2025-02-15 | Stop reason: HOSPADM

## 2025-02-11 RX ADMIN — BUPROPION HYDROCHLORIDE 150 MG: 150 TABLET, EXTENDED RELEASE ORAL at 08:13

## 2025-02-11 RX ADMIN — SODIUM CHLORIDE 100 ML/HR: 9 INJECTION, SOLUTION INTRAVENOUS at 19:04

## 2025-02-11 RX ADMIN — POTASSIUM CHLORIDE 40 MEQ: 20 TABLET, EXTENDED RELEASE ORAL at 16:26

## 2025-02-11 RX ADMIN — DIPHENOXYLATE HYDROCHLORIDE AND ATROPINE SULFATE 1 TABLET: 2.5; .025 TABLET ORAL at 20:07

## 2025-02-11 RX ADMIN — PANTOPRAZOLE SODIUM 40 MG: 40 TABLET, DELAYED RELEASE ORAL at 08:13

## 2025-02-11 RX ADMIN — DIPHENOXYLATE HYDROCHLORIDE AND ATROPINE SULFATE 1 TABLET: 2.5; .025 TABLET ORAL at 12:52

## 2025-02-11 RX ADMIN — POTASSIUM CHLORIDE 40 MEQ: 1500 TABLET, EXTENDED RELEASE ORAL at 16:26

## 2025-02-11 RX ADMIN — ENOXAPARIN SODIUM 40 MG: 100 INJECTION SUBCUTANEOUS at 20:07

## 2025-02-11 RX ADMIN — Medication 10 ML: at 19:02

## 2025-02-11 RX ADMIN — ONDANSETRON 4 MG: 2 INJECTION INTRAMUSCULAR; INTRAVENOUS at 12:52

## 2025-02-11 RX ADMIN — ACETAMINOPHEN 650 MG: 325 TABLET, FILM COATED ORAL at 21:19

## 2025-02-11 RX ADMIN — POTASSIUM CHLORIDE 40 MEQ: 20 TABLET, EXTENDED RELEASE ORAL at 16:02

## 2025-02-11 RX ADMIN — DIPHENOXYLATE HYDROCHLORIDE AND ATROPINE SULFATE 1 TABLET: 2.5; .025 TABLET ORAL at 16:02

## 2025-02-11 RX ADMIN — SODIUM CHLORIDE 100 ML/HR: 9 INJECTION, SOLUTION INTRAVENOUS at 05:56

## 2025-02-11 RX ADMIN — POTASSIUM CHLORIDE 40 MEQ: 20 TABLET, EXTENDED RELEASE ORAL at 02:12

## 2025-02-11 RX ADMIN — VILAZODONE HYDROCHLORIDE 20 MG: 10 TABLET, FILM COATED ORAL at 08:13

## 2025-02-11 RX ADMIN — ENOXAPARIN SODIUM 40 MG: 100 INJECTION SUBCUTANEOUS at 08:13

## 2025-02-11 RX ADMIN — POTASSIUM CHLORIDE 40 MEQ: 20 TABLET, EXTENDED RELEASE ORAL at 20:07

## 2025-02-11 RX ADMIN — ONDANSETRON 4 MG: 2 INJECTION INTRAMUSCULAR; INTRAVENOUS at 19:02

## 2025-02-11 RX ADMIN — Medication 10 ML: at 08:13

## 2025-02-11 RX ADMIN — POTASSIUM CHLORIDE 40 MEQ: 20 TABLET, EXTENDED RELEASE ORAL at 05:59

## 2025-02-11 NOTE — PLAN OF CARE
Goal Outcome Evaluation:  Plan of Care Reviewed With: patient        Progress: improving          Pt arrived to PCU from the ED just after shift change. She has been A&Ox4 and pleasant with care. Pt's family remains involved. She has had complaints of N/V/D and indigestion overnight. PRN pain medications admin per request and are helpful with issues. Pt was given a NS bolus followed by a continuous NS infusion. Initial set of labs resulted a decreased K+ of 3.1 noted. MD notified and K+ protocol was ordered and administered. Pt has been tachycardic since arrival but other vitals have been stable on RA.

## 2025-02-11 NOTE — H&P
[facility] Medicine Services  History & Physical    Patient Name: Mary Ann Fletcher  : 1980  MRN: [id number]  Primary Care Physician:  Leticia Gerber APRN  Date of admission: 2/10/2025  Date and Time of Service: 2/10/2025     Subjective      Chief Complaint: Nausea vomiting and diarrhea.    History of Present Illness:   This is a 55 years old female with past medical history of breast cancer currently on chemotherapy, anxiety, depression, obesity who was sent in from her outpatient oncologist visit with nausea, vomiting and diarrhea. her last chemo was 2/3 and the session was supposed to be in 3 weeks time however patient developed nausea, vomiting and diarrhea and has lost significant amount of weight.  Her oncologist sent her for further management of dehydration.    Dizziness on presentation showed a blood pressure of 108/76, heart rate 129, respiratory rate 20 saturates 92% and afebrile 37.4 °C    CBC showed WBC 7.93, hemoglobin 13.2, hematocrit 38.6 platelet 215, CMP, mag, Phos, lactic acid, troponin pending.      Review of Systems    Personal History     Past Medical History:   Diagnosis Date    Anxiety     Cancer     Left Breast         Dr Saldivar    Depression     Obesity     Most adult life       Past Surgical History:   Procedure Laterality Date    BREAST BIOPSY  10/31    Left side    PORTACATH PLACEMENT Right 2024    Procedure: INSERTION OF PORTACATH RIGHT INTERNAL JUGULAR;  Surgeon: Gonzalez Vanessa MD;  Location: [facility];  Service: General;  Laterality: Right;    TONSILLECTOMY      US GUIDED LYMPH NODE BIOPSY  10/31/2024       Family History: family history includes Breast cancer in her maternal aunt; Depression in her mother and sister; Diabetes in her maternal grandmother and mother; Esophageal cancer in her maternal grandmother. Otherwise pertinent FHx was reviewed and not pertinent to current issue.    Social History:  reports that she has quit smoking. Her smoking use  included cigarettes. She started smoking about 3 years ago. She has been exposed to tobacco smoke. She has never used smokeless tobacco. She reports current alcohol use of about 1.0 standard drink of alcohol per week. She reports that she does not use drugs.    Home Medications:  Prior to Admission Medications       Prescriptions Last Dose Informant Patient Reported? Taking?    buPROPion XL (Wellbutrin XL) 150 MG 24 hr tablet   No No    Take 1 tablet by mouth Daily.    clobetasol (TEMOVATE) 0.05 % external solution   Yes No    Apply  topically to the appropriate area as directed.    dexAMETHasone (DECADRON) 4 MG tablet   No No    Take 2 tablets oral twice a day for 3 consecutive days beginning the day before chemotherapy and continue for 6 doses.    diphenoxylate-atropine (LOMOTIL) 2.5-0.025 MG per tablet   No No    Take 1 tablet by mouth 3 (Three) Times a Day.    lidocaine-prilocaine (EMLA) 2.5-2.5 % cream   No No    Apply 1 Application topically to the appropriate area as directed Take As Directed. Apply to port 1 hour prior to access    metroNIDAZOLE (Flagyl) 500 MG tablet   No No    Take 1 tablet by mouth Every 8 (Eight) Hours for 10 days.    Patient not taking:  Reported on 2/10/2025    OLANZapine (ZyPREXA) 5 MG tablet   No No    Take 1 tablet by mouth Every Night. Take nightly for 4 starting night of chemotherapy.    ondansetron (ZOFRAN) 8 MG tablet   No No    Take 1 tablet by mouth 3 (Three) Times a Day As Needed for Nausea or Vomiting.    pantoprazole (Protonix) 40 MG EC tablet   No No    Take 1 tablet by mouth Daily.    propranolol (INDERAL) 10 MG tablet   No No    TAKE 1 TABLET BY MOUTH 3 (THREE) TIMES A DAY AS NEEDED (ANXIETY).    vilazodone (VIIBRYD) 20 MG tablet tablet   No No    Take 1 tablet by mouth Daily.              Allergies:  Allergies   Allergen Reactions    Penicillins Hives     Beta lactam allergy details  Antibiotic reaction: hives  Age at reaction: infant  Dose to reaction time:  unknown  Reason for antibiotic: unknown  Epinephrine required for reaction?: no  Tolerated antibiotics: unknown           Objective      Vitals:   Temp:  [98.6 °F (37 °C)-99.3 °F (37.4 °C)] 99.3 °F (37.4 °C)  Heart Rate:  [129-150] 129  Resp:  [20] 20  BP: (108-127)/() 108/76  Body mass index is 41.16 kg/m².  Physical Exam    Appearance: No apparent distress, nontoxic-appearing.   HEENT/Neck: Neck is supple, Extraocular movements intact, dry mucous membranes  Cardiovascular: Tachycardic with regular rhythm, No murmurs  Pulmonary: Clear to auscultation Bilaterally. No wheeze, crackles  Abdomen: BS+, Soft, nontender, nondistended.   Ext: No Cyanosis, Clubbing, Edema.    Neuro: Nonfocal, Alert & Oriented x 3        Diagnostic Data:  Lab Results (last 24 hours)       ** No results found for the last 24 hours. **             Imaging Results (Last 24 Hours)       ** No results found for the last 24 hours. **              Assessment & Plan      This is a 45 y.o. female with a past medical history of breast cancer on chemotherapy, anxiety and depression who presented with nausea, vomiting and diarrhea.    Nausea vomiting  Diarrhea  -continue volume resuscitation, keep map above 65.  -Likely secondary to the chemotherapy, no leukocytosis and she is afebrile.  Will hold off testing for C. difficile.  -Low threshold to check for c.diff if she continues to have diarrhea  -Continue gentle volume resuscitation, monitor electrolytes.  -Zofran as needed for nausea      Breast cancer.  -She follows up with oncology and is currently on chemotherapy.  -Management per oncology.      Anxiety and depression  -Continue home meds.            VTE Prophylaxis: Lovenox  Full code  Inpatient level of care  Pharmacologic VTE prophylaxis orders are present.        The patient desires to be as follows:    CODE STATUS:    Code Status (Patient has no pulse and is not breathing): CPR (Attempt to Resuscitate)  Medical Interventions (Patient has  pulse or is breathing): Full Support          I discussed the patient's findings and my recommendations with patient and nursing staff.      Signature:     This document has been electronically signed by Olga Dunn MD on February 10, 2025 20:11 [facility]

## 2025-02-11 NOTE — CASE MANAGEMENT/SOCIAL WORK
Discharge Planning Assessment   [facility]     Patient Name: Mary Ann Fletcher  MRN: [id number]  Today's Date: 2/11/2025    Admit Date: 2/10/2025    Plan: Anticipate routine home with family.   Discharge Needs Assessment       Row Name 02/11/25 1403       Living Environment    People in Home sibling(s)    Name(s) of People in Home SisterSusan Kumar    Current Living Arrangements home    Potentially Unsafe Housing Conditions none    In the past 12 months has the electric, gas, oil, or water company threatened to shut off services in your home? No    Primary Care Provided by self    Provides Primary Care For no one    Family Caregiver if Needed sibling(s);parent(s)    Family Caregiver Names Reported that her mom has also been staying with her (Demetris Saldana)    Quality of Family Relationships helpful;involved;supportive    Able to Return to Prior Arrangements yes       Resource/Environmental Concerns    Resource/Environmental Concerns none    Transportation Concerns none       Transportation Needs    In the past 12 months, has lack of transportation kept you from medical appointments or from getting medications? no    In the past 12 months, has lack of transportation kept you from meetings, work, or from getting things needed for daily living? No       Food Insecurity    Within the past 12 months, you worried that your food would run out before you got the money to buy more. Never true    Within the past 12 months, the food you bought just didn't last and you didn't have money to get more. Never true       Transition Planning    Patient/Family Anticipates Transition to home with family    Patient/Family Anticipated Services at Transition none    Transportation Anticipated car, drives self;family or friend will provide       Discharge Needs Assessment    Readmission Within the Last 30 Days no previous admission in last 30 days    Equipment Currently Used at Home none    Concerns to be Addressed denies needs/concerns at this time     Anticipated Changes Related to Illness none    Equipment Needed After Discharge none    Provided Post Acute Provider List? N/A                   Discharge Plan       Row Name 02/11/25 1404       Plan    Plan Anticipate routine home with family.    Patient/Family in Agreement with Plan yes    Plan Comments CM met with patient at bedside to discuss dc planning. PCP and pharmacy confirmed, reported no trouble affording food/medications, and declined needs at this time for any DME/HH/PT services.                 Expected Discharge Date and Time       Expected Discharge Date Expected Discharge Time    Feb 12, 2025            Demographic Summary       Row Name 02/11/25 1403       General Information    Admission Type inpatient    Arrived From emergency department    Referral Source admission list    Reason for Consult discharge planning    Preferred Language English       Contact Information    Permission Granted to Share Info With                    Functional Status       Row Name 02/11/25 1403       Functional Status    Usual Activity Tolerance good    Current Activity Tolerance good       Functional Status, IADL    Medications independent    Meal Preparation independent    Housekeeping independent    Laundry independent    Shopping independent             Letty Laguerre RN     Office Phone: [phone]  Office Cell: [phone]

## 2025-02-11 NOTE — PAYOR COMM NOTE
"This is clinical for Mary Ann Diana.    CORRECT PROVIDER IS NOT IN THIS SYSTEM AS AN OPTION.   ATTENDING: Dar Hudson NPI : [id number]     TID: [id number], ADDRESS: [address] [place] [address], [address]  PH: [phone]  FX: [phone]    AUTHORIZATION PENDING: [id number]   PLEASE FAX OR CALL DETERMINATION TO CONTACT BELOW:   THANK YOU.      Maria E Luz RN, CCM  Utilization Nurse  [address] [address]  Ph [phone]  Fx [phone]     Mary Ann Diana (45 y.o. Female)       Date of Birth   1980    Social Security Number       Address   [address] [place] [address] [place] [address]    Home Phone   [phone]    MRN   [id number]       Confucianism   None    Marital Status   Single                            Admission Date   2/10/25    Admission Type   Urgent    Admitting Provider   Olga Dunn MD    Attending Provider   Dar Hudson MD    Department, Room/Bed   [facility] PROGRESS CARE, 2132/1       Discharge Date       Discharge Disposition       Discharge Destination                                 Attending Provider: Dar Hudson MD    Allergies: Penicillins    Isolation: None   Infection: None   Code Status: CPR    Ht: 160 cm (63\")   Wt: 105 kg (232 lb 5.8 oz)    Admission Cmt: None   Principal Problem: Nausea and vomiting [R11.2]                   Active Insurance as of 2/10/2025       Primary Coverage       Payor Plan Insurance Group Employer/Plan Group    [facility] [employer] [facility] PPO [id number]       Payor Plan Address Payor Plan Phone Number Payor Plan Fax Number Effective Dates    [address] [phone]  1/1/2023 - None Entered    [facility] [id number]         Subscriber Name Subscriber Birth Date Member ID       MARY ANN DIANA 1980 [id number]                     Emergency Contacts        (Rel.) Home Phone Work Phone Mobile Phone    jolly diana (Sister) [phone] -- [phone]    Demetris Diana " Shana (Mother) -- -- [phone]                 History & Physical        Olga Dunn MD at 02/10/25 2011              [facility] Medicine Services  History & Physical    Patient Name: Mary Ann Fletcher  : 1980  MRN: [id number]  Primary Care Physician:  Leticia Gerber APRN  Date of admission: 2/10/2025  Date and Time of Service: 2/10/2025     Subjective      Chief Complaint: Nausea vomiting and diarrhea.    History of Present Illness:   This is a 55 years old female with past medical history of breast cancer currently on chemotherapy, anxiety, depression, obesity who was sent in from her outpatient oncologist visit with nausea, vomiting and diarrhea. her last chemo was 2/3 and the session was supposed to be in 3 weeks time however patient developed nausea, vomiting and diarrhea and has lost significant amount of weight.  Her oncologist sent her for further management of dehydration.    Dizziness on presentation showed a blood pressure of 108/76, heart rate 129, respiratory rate 20 saturates 92% and afebrile 37.4 °C    CBC showed WBC 7.93, hemoglobin 13.2, hematocrit 38.6 platelet 215, CMP, mag, Phos, lactic acid, troponin pending.      Review of Systems    Personal History     Past Medical History:   Diagnosis Date    Anxiety     Cancer     Left Breast         Dr Saldivar    Depression     Obesity     Most adult life       Past Surgical History:   Procedure Laterality Date    BREAST BIOPSY  10/31    Left side    PORTACATH PLACEMENT Right 2024    Procedure: INSERTION OF PORTACATH RIGHT INTERNAL JUGULAR;  Surgeon: Gonzalez Vanessa MD;  Location: [facility];  Service: General;  Laterality: Right;    TONSILLECTOMY      US GUIDED LYMPH NODE BIOPSY  10/31/2024       Family History: family history includes Breast cancer in her maternal aunt; Depression in her mother and sister; Diabetes in her maternal grandmother and mother; Esophageal cancer in her maternal grandmother. Otherwise pertinent  FHx was reviewed and not pertinent to current issue.    Social History:  reports that she has quit smoking. Her smoking use included cigarettes. She started smoking about 3 years ago. She has been exposed to tobacco smoke. She has never used smokeless tobacco. She reports current alcohol use of about 1.0 standard drink of alcohol per week. She reports that she does not use drugs.    Home Medications:  Prior to Admission Medications       Prescriptions Last Dose Informant Patient Reported? Taking?    buPROPion XL (Wellbutrin XL) 150 MG 24 hr tablet   No No    Take 1 tablet by mouth Daily.    clobetasol (TEMOVATE) 0.05 % external solution   Yes No    Apply  topically to the appropriate area as directed.    dexAMETHasone (DECADRON) 4 MG tablet   No No    Take 2 tablets oral twice a day for 3 consecutive days beginning the day before chemotherapy and continue for 6 doses.    diphenoxylate-atropine (LOMOTIL) 2.5-0.025 MG per tablet   No No    Take 1 tablet by mouth 3 (Three) Times a Day.    lidocaine-prilocaine (EMLA) 2.5-2.5 % cream   No No    Apply 1 Application topically to the appropriate area as directed Take As Directed. Apply to port 1 hour prior to access    metroNIDAZOLE (Flagyl) 500 MG tablet   No No    Take 1 tablet by mouth Every 8 (Eight) Hours for 10 days.    Patient not taking:  Reported on 2/10/2025    OLANZapine (ZyPREXA) 5 MG tablet   No No    Take 1 tablet by mouth Every Night. Take nightly for 4 starting night of chemotherapy.    ondansetron (ZOFRAN) 8 MG tablet   No No    Take 1 tablet by mouth 3 (Three) Times a Day As Needed for Nausea or Vomiting.    pantoprazole (Protonix) 40 MG EC tablet   No No    Take 1 tablet by mouth Daily.    propranolol (INDERAL) 10 MG tablet   No No    TAKE 1 TABLET BY MOUTH 3 (THREE) TIMES A DAY AS NEEDED (ANXIETY).    vilazodone (VIIBRYD) 20 MG tablet tablet   No No    Take 1 tablet by mouth Daily.              Allergies:  Allergies   Allergen Reactions    Penicillins  Hives     Beta lactam allergy details  Antibiotic reaction: hives  Age at reaction: infant  Dose to reaction time: unknown  Reason for antibiotic: unknown  Epinephrine required for reaction?: no  Tolerated antibiotics: unknown           Objective      Vitals:   Temp:  [98.6 °F (37 °C)-99.3 °F (37.4 °C)] 99.3 °F (37.4 °C)  Heart Rate:  [129-150] 129  Resp:  [20] 20  BP: (108-127)/() 108/76  Body mass index is 41.16 kg/m².  Physical Exam    Appearance: No apparent distress, nontoxic-appearing.   HEENT/Neck: Neck is supple, Extraocular movements intact, dry mucous membranes  Cardiovascular: Tachycardic with regular rhythm, No murmurs  Pulmonary: Clear to auscultation Bilaterally. No wheeze, crackles  Abdomen: BS+, Soft, nontender, nondistended.   Ext: No Cyanosis, Clubbing, Edema.    Neuro: Nonfocal, Alert & Oriented x 3        Diagnostic Data:  Lab Results (last 24 hours)       ** No results found for the last 24 hours. **             Imaging Results (Last 24 Hours)       ** No results found for the last 24 hours. **              Assessment & Plan      This is a 45 y.o. female with a past medical history of breast cancer on chemotherapy, anxiety and depression who presented with nausea, vomiting and diarrhea.    Nausea vomiting  Diarrhea  -continue volume resuscitation, keep map above 65.  -Likely secondary to the chemotherapy, no leukocytosis and she is afebrile.  Will hold off testing for C. difficile.  -Low threshold to check for c.diff if she continues to have diarrhea  -Continue gentle volume resuscitation, monitor electrolytes.  -Zofran as needed for nausea      Breast cancer.  -She follows up with oncology and is currently on chemotherapy.  -Management per oncology.      Anxiety and depression  -Continue home meds.            VTE Prophylaxis: Lovenox  Full code  Inpatient level of care  Pharmacologic VTE prophylaxis orders are present.        The patient desires to be as follows:    CODE STATUS:    Code  Status (Patient has no pulse and is not breathing): CPR (Attempt to Resuscitate)  Medical Interventions (Patient has pulse or is breathing): Full Support          I discussed the patient's findings and my recommendations with patient and nursing staff.      Signature:     This document has been electronically signed by Olga Dunn MD on February 10, 2025 20:11 [facility]    Electronically signed by Olga Dunn MD at 02/10/25 6831       Emergency Department Notes    No notes of this type exist for this encounter.       Operative/Procedure Notes (last 24 hours)  Notes from 02/10/25 1047 through 02/11/25 1047   No notes of this type exist for this encounter.       Physician Progress Notes (last 24 hours)  Notes from 02/10/25 1047 through 02/11/25 1047   No notes of this type exist for this encounter.       Consult Notes (last 24 hours)  Notes from 02/10/25 1047 through 02/11/25 1047   No notes of this type exist for this encounter.

## 2025-02-11 NOTE — PROGRESS NOTES
[facility] MEDICINE SERVICE  DAILY PROGRESS NOTE    NAME: Mary Ann Fletcher  : 1980  MRN: [id number]      LOS: 1 day     PROVIDER OF SERVICE: Dar Hudson MD    Chief Complaint: Nausea and vomiting    Subjective:     Interval History:  Patient states her diarrhea and nausea are much better although still present. She did tolerate a small amount of breakfast today.         Review of Systems:   Review of Systems    Objective:     Vital Signs  Temp:  [97.7 °F (36.5 °C)-99.3 °F (37.4 °C)] 98.5 °F (36.9 °C)  Heart Rate:  [] 97  Resp:  [16-21] 17  BP: ()/(67-85) 96/70   Body mass index is 41.16 kg/m².    Physical Exam  Physical Exam  General Appearance:  Alert, cooperative, no distress, appears stated age  Head:  Normocephalic, without obvious abnormality, atraumatic  Eyes:  PERRL, conjunctiva/corneas clear, EOM's intact, fundi benign, both eyes  Ears:  Normal TM's and external ear canals, both ears  Nose: Nares normal, septum midline, mucosa normal, no drainage or sinus tenderness  Throat: Lips, mucosa, and tongue normal; teeth and gums normal  Neck: Supple, symmetrical, trachea midline, no adenopathy, thyroid: not enlarged, symmetric, no tenderness/mass/nodules, no carotid bruit or JVD  Lungs:   Clear to auscultation bilaterally, respirations unlabored  Heart:  Regular rate and rhythm, S1, S2 normal, no murmur, rub or gallop  Abdomen:  Soft, non-tender, bowel sounds active all four quadrants,  no masses, no organomegaly  Extremities: Extremities normal, atraumatic, no cyanosis or edema  Pulses: 2+ and symmetric  Skin: Skin color, texture, turgor normal, no rashes or lesions  Neurologic: Normal         Diagnostic Data    Results from last 7 days   Lab Units 25  1259 25  0606   WBC 10*3/mm3  --  6.80   HEMOGLOBIN g/dL  --  9.9*   HEMATOCRIT %  --  29.2*   PLATELETS 10*3/mm3  --  142   GLUCOSE mg/dL  --  109*   CREATININE mg/dL  --  0.85   BUN mg/dL  --  16   SODIUM mmol/L  --  134*    POTASSIUM mmol/L 2.9* 3.0*   AST (SGOT) U/L  --  12   ALT (SGPT) U/L  --  19   ALK PHOS U/L  --  64   BILIRUBIN mg/dL  --  0.3   ANION GAP mmol/L  --  12.5       No radiology results for the last day      I reviewed the patient's new clinical results.    Assessment/Plan:     Active and Resolved Problems  Active Hospital Problems    Diagnosis  POA    **Nausea and vomiting [R11.2]  Yes      Resolved Hospital Problems   No resolved problems to display.       Nausea vomiting  Diarrhea  -continue volume resuscitation, keep map above 65  -Likely secondary to the chemotherapy as she has had similar symptoms from her previous chemotherapy sessions  -Low threshold to check for c.diff if she continues to have diarrhea  -Continue supportive care  -Continue lomotil for diarrhea  -Zofran as needed for nausea  -Diet as tolerated     Breast cancer.  -She follows up with oncology and is currently on chemotherapy.  -Management per oncology     Anxiety and depression  -Continue home meds.    VTE Prophylaxis:  Pharmacologic VTE prophylaxis orders are present.             Disposition Planning:     Barriers to Discharge:Improvement in symptoms  Anticipated Date of Discharge: 2/12  Place of Discharge: Home      Time: 40 minutes     Code Status and Medical Interventions: CPR (Attempt to Resuscitate); Full Support   Ordered at: 02/10/25 2011     Code Status (Patient has no pulse and is not breathing):    CPR (Attempt to Resuscitate)     Medical Interventions (Patient has pulse or is breathing):    Full Support       Signature: Electronically signed by Dar Hudson MD, 02/11/25, 17:10 EST.  [facility]ist Team

## 2025-02-12 LAB
ANION GAP SERPL CALCULATED.3IONS-SCNC: 10.5 MMOL/L (ref 5–15)
BUN SERPL-MCNC: 8 MG/DL (ref 6–20)
BUN/CREAT SERPL: 10.1 (ref 7–25)
CALCIUM SPEC-SCNC: 8.6 MG/DL (ref 8.6–10.5)
CHLORIDE SERPL-SCNC: 102 MMOL/L (ref 98–107)
CO2 SERPL-SCNC: 21.5 MMOL/L (ref 22–29)
CREAT SERPL-MCNC: 0.79 MG/DL (ref 0.57–1)
EGFRCR SERPLBLD CKD-EPI 2021: 95.3 ML/MIN/1.73
GLUCOSE SERPL-MCNC: 120 MG/DL (ref 65–99)
MAGNESIUM SERPL-MCNC: 1.8 MG/DL (ref 1.6–2.6)
POTASSIUM SERPL-SCNC: 3.1 MMOL/L (ref 3.5–5.2)
POTASSIUM SERPL-SCNC: 3.1 MMOL/L (ref 3.5–5.2)
POTASSIUM SERPL-SCNC: 3.3 MMOL/L (ref 3.5–5.2)
SODIUM SERPL-SCNC: 134 MMOL/L (ref 136–145)

## 2025-02-12 PROCEDURE — 80048 BASIC METABOLIC PNL TOTAL CA: CPT | Performed by: INTERNAL MEDICINE

## 2025-02-12 PROCEDURE — 84132 ASSAY OF SERUM POTASSIUM: CPT | Performed by: INTERNAL MEDICINE

## 2025-02-12 PROCEDURE — 25010000002 ONDANSETRON PER 1 MG: Performed by: INTERNAL MEDICINE

## 2025-02-12 PROCEDURE — 25010000002 POTASSIUM CHLORIDE 10 MEQ/100ML SOLUTION: Performed by: INTERNAL MEDICINE

## 2025-02-12 PROCEDURE — 25010000002 ENOXAPARIN PER 10 MG: Performed by: INTERNAL MEDICINE

## 2025-02-12 PROCEDURE — 83735 ASSAY OF MAGNESIUM: CPT

## 2025-02-12 RX ORDER — MAGNESIUM SULFATE 1 G/100ML
1 INJECTION INTRAVENOUS ONCE
Status: DISCONTINUED | OUTPATIENT
Start: 2025-02-12 | End: 2025-02-12

## 2025-02-12 RX ORDER — POTASSIUM CHLORIDE 1.5 G/1.58G
40 POWDER, FOR SOLUTION ORAL EVERY 4 HOURS
Status: COMPLETED | OUTPATIENT
Start: 2025-02-12 | End: 2025-02-13

## 2025-02-12 RX ORDER — POTASSIUM CHLORIDE 7.45 MG/ML
10 INJECTION INTRAVENOUS
Status: COMPLETED | OUTPATIENT
Start: 2025-02-12 | End: 2025-02-12

## 2025-02-12 RX ADMIN — POTASSIUM CHLORIDE 10 MEQ: 7.46 INJECTION, SOLUTION INTRAVENOUS at 04:53

## 2025-02-12 RX ADMIN — DIPHENOXYLATE HYDROCHLORIDE AND ATROPINE SULFATE 1 TABLET: 2.5; .025 TABLET ORAL at 20:11

## 2025-02-12 RX ADMIN — ONDANSETRON 4 MG: 2 INJECTION INTRAMUSCULAR; INTRAVENOUS at 11:12

## 2025-02-12 RX ADMIN — ENOXAPARIN SODIUM 40 MG: 100 INJECTION SUBCUTANEOUS at 07:18

## 2025-02-12 RX ADMIN — POTASSIUM CHLORIDE 40 MEQ: 1.5 POWDER, FOR SOLUTION ORAL at 20:11

## 2025-02-12 RX ADMIN — Medication 10 ML: at 20:12

## 2025-02-12 RX ADMIN — VILAZODONE HYDROCHLORIDE 20 MG: 10 TABLET, FILM COATED ORAL at 07:18

## 2025-02-12 RX ADMIN — POTASSIUM CHLORIDE 10 MEQ: 7.46 INJECTION, SOLUTION INTRAVENOUS at 02:48

## 2025-02-12 RX ADMIN — POTASSIUM CHLORIDE 10 MEQ: 7.46 INJECTION, SOLUTION INTRAVENOUS at 06:05

## 2025-02-12 RX ADMIN — ACETAMINOPHEN 650 MG: 325 TABLET, FILM COATED ORAL at 07:24

## 2025-02-12 RX ADMIN — DIPHENOXYLATE HYDROCHLORIDE AND ATROPINE SULFATE 1 TABLET: 2.5; .025 TABLET ORAL at 16:49

## 2025-02-12 RX ADMIN — Medication 10 ML: at 07:21

## 2025-02-12 RX ADMIN — POTASSIUM CHLORIDE 10 MEQ: 7.46 INJECTION, SOLUTION INTRAVENOUS at 07:18

## 2025-02-12 RX ADMIN — ACETAMINOPHEN 650 MG: 325 TABLET, FILM COATED ORAL at 20:22

## 2025-02-12 RX ADMIN — DIPHENOXYLATE HYDROCHLORIDE AND ATROPINE SULFATE 1 TABLET: 2.5; .025 TABLET ORAL at 07:18

## 2025-02-12 RX ADMIN — PANTOPRAZOLE SODIUM 40 MG: 40 TABLET, DELAYED RELEASE ORAL at 07:18

## 2025-02-12 RX ADMIN — ENOXAPARIN SODIUM 40 MG: 100 INJECTION SUBCUTANEOUS at 20:11

## 2025-02-12 RX ADMIN — POTASSIUM CHLORIDE 40 MEQ: 1.5 POWDER, FOR SOLUTION ORAL at 16:49

## 2025-02-12 RX ADMIN — CLOBETASOL PROPIONATE: 0.5 OINTMENT TOPICAL at 07:25

## 2025-02-12 RX ADMIN — POTASSIUM CHLORIDE 10 MEQ: 7.46 INJECTION, SOLUTION INTRAVENOUS at 09:38

## 2025-02-12 RX ADMIN — POTASSIUM CHLORIDE 10 MEQ: 7.46 INJECTION, SOLUTION INTRAVENOUS at 03:48

## 2025-02-12 RX ADMIN — BUPROPION HYDROCHLORIDE 150 MG: 150 TABLET, EXTENDED RELEASE ORAL at 07:19

## 2025-02-12 NOTE — PLAN OF CARE
Goal Outcome Evaluation:  Plan of Care Reviewed With: patient        Progress: no change        Pt has had intermittent complaints of nausea and a HA over night. PRN meds admin per request (see MAR). Pt did have a couple episodes of loose stools. K+ rechecked and it was still low at 3.1. Replacement protocol initiated once again. HR remains tachy but other VSS on RA.

## 2025-02-12 NOTE — CONSULTS
"Nutrition Services    Patient Name: Mary Ann Fletcher  YOB: 1980  MRN: [id number]  Admission date: 2/10/2025    RD to add Boost Original BID (Provides 480 kcals, 20 g protein if consumed)       NUTRITION SCREENING      Trending Narrative: 2/12: Nutrition screening r/t malnutrition screening tool score of 3. Pt presented to ED on 2/10 from her outpatient oncologist visit with N/V/D. Pt's last chemo session was 2/3. Oncologist sent pt to ED r/t concern for dehydration. Pt has continued to have loose stools but improving. Pt is tolerating po diet better today. RD will add ONS to supplement po intake due to variable intake.        PO Diet: Diet: Regular/House; Fluid Consistency: Thin (IDDSI 0)   PO Supplements: None    Trending PO Intake:  2/12: 50% average po intake x last 4 documented meals        Nutritionally-Pertinent Medications RDN Reviewed, C/W clinical course         Labs (reviewed below): Hyponatremia - management per attending  Hypokalemia - replaced today     Results from last 7 days   Lab Units 02/12/25  1118 02/12/25  0205 02/11/25  1259 02/11/25  0606 02/10/25  2052   SODIUM mmol/L 134*  --   --  134* 132*   POTASSIUM mmol/L 3.3* 3.1* 2.9* 3.0* 3.1*   CHLORIDE mmol/L 102  --   --  100 92*   CO2 mmol/L 21.5*  --   --  21.5* 21.1*   BUN mg/dL 8  --   --  16 26*   CREATININE mg/dL 0.79  --   --  0.85 1.27*   CALCIUM mg/dL 8.6  --   --  8.6 9.7   BILIRUBIN mg/dL  --   --   --  0.3 0.6   ALK PHOS U/L  --   --   --  64 82   ALT (SGPT) U/L  --   --   --  19 27   AST (SGOT) U/L  --   --   --  12 16   GLUCOSE mg/dL 120*  --   --  109* 150*     Results from last 7 days   Lab Units 02/12/25  1118 02/11/25  0606 02/10/25  2052   MAGNESIUM mg/dL 1.8 1.9 2.0   PHOSPHORUS mg/dL  --  2.5 3.6   HEMOGLOBIN g/dL  --  9.9*  --    HEMATOCRIT %  --  29.2*  --      No results found for: \"HGBA1C\"       GI Function:  Last documented BM 2/12       Skin: No PI documented        Weight Review: Estimated body mass index is " "41.43 kg/m² as calculated from the following:    Height as of this encounter: 160 cm (63\").    Weight as of this encounter: 106 kg (233 lb 14.5 oz).    Comment:   2/12: 233#  Current weight c/w 3 months ago  Fluctuation in weight during that time.    Wt Readings from Last 30 Encounters:   02/12/25 0500 106 kg (233 lb 14.5 oz)   02/11/25 0500 105 kg (232 lb 5.8 oz)   02/10/25 1937 105 kg (232 lb 5.8 oz)   02/10/25 1337 107 kg (236 lb)   02/06/25 0823 113 kg (250 lb)   02/03/25 0932 113 kg (250 lb)   01/31/25 1302 114 kg (250 lb 9.6 oz)   01/29/25 1512 113 kg (248 lb 9.6 oz)   01/27/25 1302 112 kg (247 lb)   01/24/25 1304 112 kg (247 lb)   01/22/25 1329 112 kg (248 lb)   01/20/25 1306 111 kg (245 lb 4.8 oz)   01/16/25 1036 115 kg (253 lb 8 oz)   01/13/25 0800 113 kg (248 lb 8 oz)   01/03/25 1135 108 kg (239 lb)   01/02/25 0905 108 kg (237 lb 9.6 oz)   12/23/24 0745 111 kg (244 lb 9.6 oz)   12/10/24 1053 108 kg (238 lb)   12/02/24 0750 110 kg (242 lb 8 oz)   11/25/24 0657 107 kg (236 lb)   11/21/24 1026 106 kg (234 lb)   11/19/24 0727 107 kg (235 lb)   11/15/24 1507 107 kg (235 lb)   11/13/24 1403 105 kg (232 lb)   11/07/24 0925 103 kg (228 lb)   09/30/24 1341 104 kg (228 lb 4.8 oz)   09/19/24 1250 103 kg (227 lb 1.6 oz)   09/11/24 1320 104 kg (228 lb 9.6 oz)          --       Nutrition Problem Statement: Inadequate po intake related to poor appetite and  hypermetabolism in the setting of cancer as evidenced by po intake < 50%.         Nutrition Intervention: Continue to encourage good po intake.     RD to add Boost Original BID (Provides 480 kcals, 20 g protein if consumed)             Monitoring/Evaluation Per protocol, I&O, PO intake, Supplement intake, Pertinent labs, Weight, Skin status, GI status, Symptoms, POC/GOC, Swallow function, Hemodynamic stability            RD to follow up per protocol.    Electronically signed by:  Deloris Stevenson RD  02/12/25 14:42 EST      "

## 2025-02-12 NOTE — CASE MANAGEMENT/SOCIAL WORK
Continued Stay Note  [facility]     Patient Name: Mary Ann Fletcher  MRN: [id number]  Today's Date: 2/12/2025    Admit Date: 2/10/2025    Plan: Anticipate routine home with family.   Discharge Plan       Row Name 02/12/25 9467       Plan    Plan Comments DC Barriers: Hypokalemia (K 3.1).                Letty Laguerre RN     Office Phone: [phone]  Office Cell: [phone]

## 2025-02-12 NOTE — PROGRESS NOTES
[facility] MEDICINE SERVICE  DAILY PROGRESS NOTE    NAME: Mary Ann Fletcher  : 1980  MRN: [id number]      LOS: 2 days     PROVIDER OF SERVICE: Gilles Fernández MD    Chief Complaint: Nausea and vomiting    Subjective:     Interval History:  History taken from: patient    Still with significant watery diarrhea. Had episode this morning. Overall decreased slightly in frequency, however, still significant watery diarrhea. Non bloody.   No vomiting.      Review of Systems:   Review of Systems   Constitutional:  Negative for chills and fever.   HENT:  Negative for congestion, ear discharge, ear pain, sinus pain and sore throat.    Respiratory:  Negative for apnea, cough, chest tightness, shortness of breath and wheezing.    Cardiovascular:  Negative for chest pain and palpitations.   Gastrointestinal:  Positive for diarrhea and nausea. Negative for abdominal pain, constipation and vomiting.   Endocrine: Negative for cold intolerance and heat intolerance.   Genitourinary:  Negative for dysuria, flank pain, hematuria and urgency.   Musculoskeletal:  Negative for arthralgias, back pain, joint swelling, myalgias and neck pain.   Skin: Negative.    Neurological: Negative.    Hematological:  Does not bruise/bleed easily.   Psychiatric/Behavioral:  Negative for agitation, confusion, hallucinations, sleep disturbance and suicidal ideas. The patient is not nervous/anxious.        Objective:     Vital Signs  Temp:  [98 °F (36.7 °C)-99.2 °F (37.3 °C)] 98.7 °F (37.1 °C)  Heart Rate:  [] 113  Resp:  [16-19] 17  BP: ()/(57-85) 115/73   Body mass index is 41.43 kg/m².    Physical Exam  Physical Exam  Constitutional:       General: She is not in acute distress.     Appearance: Normal appearance.   HENT:      Head: Normocephalic and atraumatic.      Nose: Nose normal.      Mouth/Throat:      Mouth: Mucous membranes are moist.   Eyes:      Extraocular Movements: Extraocular movements intact.       Conjunctiva/sclera: Conjunctivae normal.   Cardiovascular:      Rate and Rhythm: Normal rate and regular rhythm.   Pulmonary:      Effort: Pulmonary effort is normal.      Breath sounds: Normal breath sounds.   Abdominal:      General: Abdomen is flat. Bowel sounds are normal.      Palpations: Abdomen is soft.   Musculoskeletal:         General: Normal range of motion.      Cervical back: Normal range of motion and neck supple.   Skin:     General: Skin is warm and dry.   Neurological:      General: No focal deficit present.      Mental Status: She is alert.   Psychiatric:         Mood and Affect: Mood normal.            Diagnostic Data    Results from last 7 days   Lab Units 02/12/25  0205 02/11/25  1259 02/11/25  0606   WBC 10*3/mm3  --   --  6.80   HEMOGLOBIN g/dL  --   --  9.9*   HEMATOCRIT %  --   --  29.2*   PLATELETS 10*3/mm3  --   --  142   GLUCOSE mg/dL  --   --  109*   CREATININE mg/dL  --   --  0.85   BUN mg/dL  --   --  16   SODIUM mmol/L  --   --  134*   POTASSIUM mmol/L 3.1*   < > 3.0*   AST (SGOT) U/L  --   --  12   ALT (SGPT) U/L  --   --  19   ALK PHOS U/L  --   --  64   BILIRUBIN mg/dL  --   --  0.3   ANION GAP mmol/L  --   --  12.5    < > = values in this interval not displayed.       No radiology results for the last day      I reviewed the patient's new clinical results.    Assessment/Plan:     Active and Resolved Problems  Active Hospital Problems    Diagnosis  POA    **Nausea and vomiting [R11.2]  Yes      Resolved Hospital Problems   No resolved problems to display.     Nausea vomiting  Diarrhea    -Likely secondary to the chemotherapy as she has had similar symptoms from her previous chemotherapy sessions  -Will check c.diff if worsens  -Continue supportive care  -replace potassium and magnesium  -Continue lomotil for diarrhea  -Zofran as needed for nausea  -Diet as tolerated     Breast cancer.  -She follows up with oncology and is currently on chemotherapy.  -Management per oncology      Anxiety and depression  -Continue home meds.     VTE Prophylaxis:  Pharmacologic VTE prophylaxis orders are present.                 Disposition Planning:     Barriers to Discharge: improvement in diarrhea  Anticipated Date of Discharge: 2/13  Place of Discharge: home      Time: 35 minutes     Code Status and Medical Interventions: CPR (Attempt to Resuscitate); Full Support   Ordered at: 02/10/25 2011     Code Status (Patient has no pulse and is not breathing):    CPR (Attempt to Resuscitate)     Medical Interventions (Patient has pulse or is breathing):    Full Support       Signature: Electronically signed by Gilles Fernández MD, 02/12/25, 09:45 EST.  [facility], operated by [facility] Hospitalist Team

## 2025-02-13 LAB
ANION GAP SERPL CALCULATED.3IONS-SCNC: 10.8 MMOL/L (ref 5–15)
BASOPHILS # BLD AUTO: 0.02 10*3/MM3 (ref 0–0.2)
BASOPHILS NFR BLD AUTO: 0.3 % (ref 0–1.5)
BUN SERPL-MCNC: 5 MG/DL (ref 6–20)
BUN/CREAT SERPL: 6 (ref 7–25)
CALCIUM SPEC-SCNC: 8.5 MG/DL (ref 8.6–10.5)
CHLORIDE SERPL-SCNC: 104 MMOL/L (ref 98–107)
CO2 SERPL-SCNC: 23.2 MMOL/L (ref 22–29)
CREAT SERPL-MCNC: 0.83 MG/DL (ref 0.57–1)
DEPRECATED RDW RBC AUTO: 60.1 FL (ref 37–54)
EGFRCR SERPLBLD CKD-EPI 2021: 89.8 ML/MIN/1.73
EOSINOPHIL # BLD AUTO: 0 10*3/MM3 (ref 0–0.4)
EOSINOPHIL NFR BLD AUTO: 0 % (ref 0.3–6.2)
ERYTHROCYTE [DISTWIDTH] IN BLOOD BY AUTOMATED COUNT: 16.1 % (ref 12.3–15.4)
GLUCOSE SERPL-MCNC: 105 MG/DL (ref 65–99)
HCT VFR BLD AUTO: 26.3 % (ref 34–46.6)
HGB BLD-MCNC: 8.4 G/DL (ref 12–15.9)
IMM GRANULOCYTES # BLD AUTO: 0.09 10*3/MM3 (ref 0–0.05)
IMM GRANULOCYTES NFR BLD AUTO: 1.4 % (ref 0–0.5)
LYMPHOCYTES # BLD AUTO: 1.9 10*3/MM3 (ref 0.7–3.1)
LYMPHOCYTES NFR BLD AUTO: 28.9 % (ref 19.6–45.3)
MCH RBC QN AUTO: 32.6 PG (ref 26.6–33)
MCHC RBC AUTO-ENTMCNC: 31.9 G/DL (ref 31.5–35.7)
MCV RBC AUTO: 101.9 FL (ref 79–97)
MONOCYTES # BLD AUTO: 0.32 10*3/MM3 (ref 0.1–0.9)
MONOCYTES NFR BLD AUTO: 4.9 % (ref 5–12)
NEUTROPHILS NFR BLD AUTO: 4.25 10*3/MM3 (ref 1.7–7)
NEUTROPHILS NFR BLD AUTO: 64.5 % (ref 42.7–76)
NRBC BLD AUTO-RTO: 0 /100 WBC (ref 0–0.2)
PLATELET # BLD AUTO: 128 10*3/MM3 (ref 140–450)
PMV BLD AUTO: 11.1 FL (ref 6–12)
POTASSIUM SERPL-SCNC: 2.8 MMOL/L (ref 3.5–5.2)
POTASSIUM SERPL-SCNC: 3 MMOL/L (ref 3.5–5.2)
POTASSIUM SERPL-SCNC: 3.2 MMOL/L (ref 3.5–5.2)
RBC # BLD AUTO: 2.58 10*6/MM3 (ref 3.77–5.28)
SODIUM SERPL-SCNC: 138 MMOL/L (ref 136–145)
WBC NRBC COR # BLD AUTO: 6.58 10*3/MM3 (ref 3.4–10.8)

## 2025-02-13 PROCEDURE — 99222 1ST HOSP IP/OBS MODERATE 55: CPT | Performed by: SURGERY

## 2025-02-13 PROCEDURE — 80048 BASIC METABOLIC PNL TOTAL CA: CPT | Performed by: INTERNAL MEDICINE

## 2025-02-13 PROCEDURE — 25010000002 ENOXAPARIN PER 10 MG: Performed by: INTERNAL MEDICINE

## 2025-02-13 PROCEDURE — 84132 ASSAY OF SERUM POTASSIUM: CPT | Performed by: INTERNAL MEDICINE

## 2025-02-13 PROCEDURE — 25810000003 SODIUM CHLORIDE 0.9 % SOLUTION: Performed by: INTERNAL MEDICINE

## 2025-02-13 PROCEDURE — 25010000002 ONDANSETRON PER 1 MG: Performed by: INTERNAL MEDICINE

## 2025-02-13 PROCEDURE — 85025 COMPLETE CBC W/AUTO DIFF WBC: CPT | Performed by: INTERNAL MEDICINE

## 2025-02-13 PROCEDURE — 25010000002 MAGNESIUM SULFATE IN D5W 1G/100ML (PREMIX) 1-5 GM/100ML-% SOLUTION: Performed by: INTERNAL MEDICINE

## 2025-02-13 RX ORDER — POTASSIUM CHLORIDE 1500 MG/1
40 TABLET, EXTENDED RELEASE ORAL EVERY 4 HOURS
Status: DISPENSED | OUTPATIENT
Start: 2025-02-13 | End: 2025-02-14

## 2025-02-13 RX ORDER — SULFAMETHOXAZOLE AND TRIMETHOPRIM 800; 160 MG/1; MG/1
1 TABLET ORAL EVERY 12 HOURS SCHEDULED
Status: DISCONTINUED | OUTPATIENT
Start: 2025-02-13 | End: 2025-02-15 | Stop reason: HOSPADM

## 2025-02-13 RX ORDER — POTASSIUM CHLORIDE 1.5 G/1.58G
40 POWDER, FOR SOLUTION ORAL EVERY 4 HOURS
Status: COMPLETED | OUTPATIENT
Start: 2025-02-13 | End: 2025-02-13

## 2025-02-13 RX ORDER — MAGNESIUM SULFATE 1 G/100ML
1 INJECTION INTRAVENOUS ONCE
Status: COMPLETED | OUTPATIENT
Start: 2025-02-13 | End: 2025-02-13

## 2025-02-13 RX ADMIN — POTASSIUM CHLORIDE 40 MEQ: 1.5 POWDER, FOR SOLUTION ORAL at 01:21

## 2025-02-13 RX ADMIN — PANTOPRAZOLE SODIUM 40 MG: 40 TABLET, DELAYED RELEASE ORAL at 09:07

## 2025-02-13 RX ADMIN — SULFAMETHOXAZOLE AND TRIMETHOPRIM 1 TABLET: 800; 160 TABLET ORAL at 20:12

## 2025-02-13 RX ADMIN — ENOXAPARIN SODIUM 40 MG: 100 INJECTION SUBCUTANEOUS at 09:07

## 2025-02-13 RX ADMIN — BUPROPION HYDROCHLORIDE 150 MG: 150 TABLET, EXTENDED RELEASE ORAL at 09:08

## 2025-02-13 RX ADMIN — SODIUM CHLORIDE 500 ML: 9 INJECTION, SOLUTION INTRAVENOUS at 14:32

## 2025-02-13 RX ADMIN — POTASSIUM CHLORIDE 40 MEQ: 1.5 FOR SOLUTION ORAL at 10:51

## 2025-02-13 RX ADMIN — VILAZODONE HYDROCHLORIDE 20 MG: 10 TABLET, FILM COATED ORAL at 09:08

## 2025-02-13 RX ADMIN — ACETAMINOPHEN 650 MG: 325 TABLET, FILM COATED ORAL at 20:37

## 2025-02-13 RX ADMIN — DIPHENOXYLATE HYDROCHLORIDE AND ATROPINE SULFATE 1 TABLET: 2.5; .025 TABLET ORAL at 20:12

## 2025-02-13 RX ADMIN — ENOXAPARIN SODIUM 40 MG: 100 INJECTION SUBCUTANEOUS at 20:12

## 2025-02-13 RX ADMIN — DIPHENOXYLATE HYDROCHLORIDE AND ATROPINE SULFATE 1 TABLET: 2.5; .025 TABLET ORAL at 09:08

## 2025-02-13 RX ADMIN — POTASSIUM CHLORIDE 40 MEQ: 1500 TABLET, EXTENDED RELEASE ORAL at 23:20

## 2025-02-13 RX ADMIN — POTASSIUM CHLORIDE 40 MEQ: 1.5 FOR SOLUTION ORAL at 16:24

## 2025-02-13 RX ADMIN — ONDANSETRON 4 MG: 2 INJECTION INTRAMUSCULAR; INTRAVENOUS at 20:37

## 2025-02-13 RX ADMIN — Medication 10 ML: at 20:14

## 2025-02-13 RX ADMIN — MAGNESIUM SULFATE HEPTAHYDRATE 1 G: 1 INJECTION, SOLUTION INTRAVENOUS at 12:11

## 2025-02-13 RX ADMIN — DIPHENOXYLATE HYDROCHLORIDE AND ATROPINE SULFATE 1 TABLET: 2.5; .025 TABLET ORAL at 16:24

## 2025-02-13 RX ADMIN — Medication 10 ML: at 09:11

## 2025-02-13 RX ADMIN — SODIUM CHLORIDE 500 ML: 9 INJECTION, SOLUTION INTRAVENOUS at 12:12

## 2025-02-13 NOTE — NURSING NOTE
"85-year-old female history significant of breast cancer and currently on chemo patient presented to the hospital with nausea vomiting and diarrhea.  A consult was received to assess patient's labial area.  Patient reports that she has had intermittently some \"cysts \"come up in her perineal area usually along the groin.  States that she was told that this was a side effect of her chemo however the one that has started on her labial area is not improving.  Not going away.  She reports discomfort.  Indeed there is a hard red raised area approximately 5 cm x 2-1/2 cm.  The area is quite indurated surrounding this there is odor.  It has started draining a small amount, tan purulent exudate.  No recommendations from wound care other than to continue with the warm compresses can protect some of the skin with a zinc oxide barrier consider a surgical consult for possible I&D.  "

## 2025-02-13 NOTE — CONSULTS
Nutrition Services    Patient Name:  Mary Ann Fletcher  YOB: 1980  MRN: [id number]  Admit Date:  2/10/2025    Nursing admission screening consult received. RD completed Nutrition screening yesterday (2/12). RD added Boost Original BID (Provides 480 kcals, 20 g protein if consumed). See previous note for full nutrition screening report. RD will continue to monitor per protocol.     Electronically signed by:  Deloris Stevenson RD  02/13/25 07:03 EST

## 2025-02-13 NOTE — PLAN OF CARE
Goal Outcome Evaluation:  Plan of Care Reviewed With: patient        Progress: no change        Pt with decreased complaints of nausea overnight however she does cont to have diarrhea. K+ replacement is ongoing. Recheck to be drawn soon. Pt did have new c/o a cyst to her arlet area r/t side effects from her chemo. Area was cleaned, cream was applied, and a WCN consult was placed for further evaluation.

## 2025-02-13 NOTE — CASE MANAGEMENT/SOCIAL WORK
Continued Stay Note  [facility]     Patient Name: Mary Ann Fletcher  MRN: [id number]  Today's Date: 2/13/2025    Admit Date: 2/10/2025    Plan: Anticipate routine home with family.   Discharge Plan       Row Name 02/13/25 1223       Plan    Plan Anticipate routine home with family.    Plan Comments Barrier to D/C: replacing electrolytes (K 3.2), general surgery consult.                      Expected Discharge Date and Time       Expected Discharge Date Expected Discharge Time    Feb 14, 2025           Phone communication or documentation only - no physical contact with patient or family.     BRANDON WorthingtonN, RN    [place] [id number]    Office: [phone]  Fax: [phone]

## 2025-02-13 NOTE — PROGRESS NOTES
[facility] MEDICINE SERVICE  DAILY PROGRESS NOTE    NAME: Mary Ann Fletcher  : 1980  MRN: [id number]      LOS: 3 days     PROVIDER OF SERVICE: Gilles Fernández MD    Chief Complaint: Nausea and vomiting    Subjective:     Interval History:  History taken from: patient    Diarrhea improved. Less frequent. Ambulating ok, noted to be tachycardic        Review of Systems:   Review of Systems   Constitutional:  Negative for chills and fever.   HENT:  Negative for congestion, ear discharge, ear pain, sinus pain and sore throat.    Respiratory:  Negative for apnea, cough, chest tightness, shortness of breath and wheezing.    Cardiovascular:  Negative for chest pain and palpitations.   Gastrointestinal:  Positive for diarrhea. Negative for abdominal pain, blood in stool, constipation, nausea and vomiting.   Endocrine: Negative for cold intolerance and heat intolerance.   Genitourinary:  Negative for dysuria, flank pain, hematuria and urgency.   Musculoskeletal:  Negative for arthralgias, back pain, joint swelling, myalgias and neck pain.   Skin: Negative.    Neurological: Negative.    Hematological:  Does not bruise/bleed easily.   Psychiatric/Behavioral:  Negative for agitation, confusion, hallucinations, sleep disturbance and suicidal ideas. The patient is not nervous/anxious.        Objective:     Vital Signs  Temp:  [97.7 °F (36.5 °C)-98.8 °F (37.1 °C)] 97.8 °F (36.6 °C)  Heart Rate:  [] 99  Resp:  [15-23] 22  BP: (113-155)/(64-89) 155/89   Body mass index is 42.84 kg/m².    Physical Exam  Physical Exam  Constitutional:       General: She is not in acute distress.     Appearance: Normal appearance.   HENT:      Head: Normocephalic and atraumatic.      Nose: Nose normal.      Mouth/Throat:      Mouth: Mucous membranes are moist.   Eyes:      Extraocular Movements: Extraocular movements intact.      Conjunctiva/sclera: Conjunctivae normal.   Cardiovascular:      Rate and Rhythm: Normal rate and  regular rhythm.   Pulmonary:      Effort: Pulmonary effort is normal.      Breath sounds: Normal breath sounds.   Abdominal:      General: Abdomen is flat. Bowel sounds are normal.      Palpations: Abdomen is soft.   Musculoskeletal:         General: Normal range of motion.      Cervical back: Normal range of motion and neck supple.   Skin:     General: Skin is warm and dry.   Neurological:      General: No focal deficit present.      Mental Status: She is alert.   Psychiatric:         Mood and Affect: Mood normal.            Diagnostic Data    Results from last 7 days   Lab Units 02/13/25  1411 02/13/25  0610 02/11/25  1259 02/11/25  0606   WBC 10*3/mm3  --  6.58  --  6.80   HEMOGLOBIN g/dL  --  8.4*  --  9.9*   HEMATOCRIT %  --  26.3*  --  29.2*   PLATELETS 10*3/mm3  --  128*  --  142   GLUCOSE mg/dL  --  105*   < > 109*   CREATININE mg/dL  --  0.83   < > 0.85   BUN mg/dL  --  5*   < > 16   SODIUM mmol/L  --  138   < > 134*   POTASSIUM mmol/L 3.0* 3.2*   < > 3.0*   AST (SGOT) U/L  --   --   --  12   ALT (SGPT) U/L  --   --   --  19   ALK PHOS U/L  --   --   --  64   BILIRUBIN mg/dL  --   --   --  0.3   ANION GAP mmol/L  --  10.8   < > 12.5    < > = values in this interval not displayed.       No radiology results for the last day      I reviewed the patient's new clinical results.    Assessment/Plan:     Active and Resolved Problems  Active Hospital Problems    Diagnosis  POA    **Nausea and vomiting [R11.2]  Yes      Resolved Hospital Problems   No resolved problems to display.       Nausea vomiting  Diarrhea   -improving  -Likely secondary to the chemotherapy as she has had similar symptoms from her previous chemotherapy sessions  -Will check c.diff if worsens  -Continue supportive care  -replace potassium and magnesium  -Continue lomotil for diarrhea  -Zofran as needed for nausea  -Diet as tolerated  -given iv fluid boluses today-patient tachycardic, orthostatic vitals ok. Replace mg and K    Labial  lesion/groin abscess- Gen surgery to evaluate.      Breast cancer.  -She follows up with oncology and is currently on chemotherapy.  -Management per oncology     Anxiety and depression  -Continue home meds.       VTE Prophylaxis:  Pharmacologic VTE prophylaxis orders are present.             Disposition Planning:     Barriers to Discharge:surg eval, iv fluids  Anticipated Date of Discharge: 2/14  Place of Discharge: home      Time: 45 minutes     Code Status and Medical Interventions: CPR (Attempt to Resuscitate); Full Support   Ordered at: 02/10/25 2011     Code Status (Patient has no pulse and is not breathing):    CPR (Attempt to Resuscitate)     Medical Interventions (Patient has pulse or is breathing):    Full Support       Signature: Electronically signed by Gilles Fernández MD, 02/13/25, 17:03 EST.  [facility]

## 2025-02-14 ENCOUNTER — ANESTHESIA (OUTPATIENT)
Dept: PERIOP | Facility: HOSPITAL | Age: 45
End: 2025-02-14
Payer: COMMERCIAL

## 2025-02-14 ENCOUNTER — ANESTHESIA EVENT (OUTPATIENT)
Dept: PERIOP | Facility: HOSPITAL | Age: 45
End: 2025-02-14
Payer: COMMERCIAL

## 2025-02-14 LAB
ANION GAP SERPL CALCULATED.3IONS-SCNC: 9.4 MMOL/L (ref 5–15)
B-HCG UR QL: NEGATIVE
BASOPHILS # BLD AUTO: 0.02 10*3/MM3 (ref 0–0.2)
BASOPHILS NFR BLD AUTO: 0.3 % (ref 0–1.5)
BUN SERPL-MCNC: 4 MG/DL (ref 6–20)
BUN/CREAT SERPL: 5.2 (ref 7–25)
CALCIUM SPEC-SCNC: 8.4 MG/DL (ref 8.6–10.5)
CHLORIDE SERPL-SCNC: 106 MMOL/L (ref 98–107)
CO2 SERPL-SCNC: 24.6 MMOL/L (ref 22–29)
CREAT SERPL-MCNC: 0.77 MG/DL (ref 0.57–1)
DEPRECATED RDW RBC AUTO: 60 FL (ref 37–54)
EGFRCR SERPLBLD CKD-EPI 2021: 98.3 ML/MIN/1.73
EOSINOPHIL # BLD AUTO: 0 10*3/MM3 (ref 0–0.4)
EOSINOPHIL NFR BLD AUTO: 0 % (ref 0.3–6.2)
ERYTHROCYTE [DISTWIDTH] IN BLOOD BY AUTOMATED COUNT: 16.3 % (ref 12.3–15.4)
GLUCOSE SERPL-MCNC: 107 MG/DL (ref 65–99)
HCT VFR BLD AUTO: 27.3 % (ref 34–46.6)
HGB BLD-MCNC: 9.1 G/DL (ref 12–15.9)
IMM GRANULOCYTES # BLD AUTO: 0.04 10*3/MM3 (ref 0–0.05)
IMM GRANULOCYTES NFR BLD AUTO: 0.7 % (ref 0–0.5)
LYMPHOCYTES # BLD AUTO: 2.31 10*3/MM3 (ref 0.7–3.1)
LYMPHOCYTES NFR BLD AUTO: 38.5 % (ref 19.6–45.3)
MCH RBC QN AUTO: 33.8 PG (ref 26.6–33)
MCHC RBC AUTO-ENTMCNC: 33.3 G/DL (ref 31.5–35.7)
MCV RBC AUTO: 101.5 FL (ref 79–97)
MONOCYTES # BLD AUTO: 0.2 10*3/MM3 (ref 0.1–0.9)
MONOCYTES NFR BLD AUTO: 3.3 % (ref 5–12)
NEUTROPHILS NFR BLD AUTO: 3.43 10*3/MM3 (ref 1.7–7)
NEUTROPHILS NFR BLD AUTO: 57.2 % (ref 42.7–76)
NRBC BLD AUTO-RTO: 0 /100 WBC (ref 0–0.2)
PLATELET # BLD AUTO: 187 10*3/MM3 (ref 140–450)
PMV BLD AUTO: 11.9 FL (ref 6–12)
POTASSIUM SERPL-SCNC: 3.3 MMOL/L (ref 3.5–5.2)
RBC # BLD AUTO: 2.69 10*6/MM3 (ref 3.77–5.28)
SODIUM SERPL-SCNC: 140 MMOL/L (ref 136–145)
WBC NRBC COR # BLD AUTO: 6 10*3/MM3 (ref 3.4–10.8)

## 2025-02-14 PROCEDURE — 25810000003 LACTATED RINGERS PER 1000 ML

## 2025-02-14 PROCEDURE — 25010000002 ONDANSETRON PER 1 MG: Performed by: SURGERY

## 2025-02-14 PROCEDURE — 0J9C0ZZ DRAINAGE OF PELVIC REGION SUBCUTANEOUS TISSUE AND FASCIA, OPEN APPROACH: ICD-10-PCS | Performed by: SURGERY

## 2025-02-14 PROCEDURE — 25010000002 BUPIVACAINE (PF) 0.25 % SOLUTION: Performed by: SURGERY

## 2025-02-14 PROCEDURE — 80048 BASIC METABOLIC PNL TOTAL CA: CPT | Performed by: INTERNAL MEDICINE

## 2025-02-14 PROCEDURE — 25010000002 KETOROLAC TROMETHAMINE PER 15 MG

## 2025-02-14 PROCEDURE — 25810000003 SODIUM CHLORIDE 0.9 % SOLUTION: Performed by: SURGERY

## 2025-02-14 PROCEDURE — 25010000002 MAGNESIUM SULFATE IN D5W 1G/100ML (PREMIX) 1-5 GM/100ML-% SOLUTION: Performed by: INTERNAL MEDICINE

## 2025-02-14 PROCEDURE — 25010000002 HYDROMORPHONE 1 MG/ML SOLUTION

## 2025-02-14 PROCEDURE — 25010000002 ONDANSETRON PER 1 MG

## 2025-02-14 PROCEDURE — 25010000002 FENTANYL CITRATE (PF) 100 MCG/2ML SOLUTION

## 2025-02-14 PROCEDURE — 25010000002 PROPOFOL 10 MG/ML EMULSION

## 2025-02-14 PROCEDURE — 81025 URINE PREGNANCY TEST: CPT | Performed by: SURGERY

## 2025-02-14 PROCEDURE — 10061 I&D ABSCESS COMP/MULTIPLE: CPT | Performed by: SURGERY

## 2025-02-14 PROCEDURE — 25010000002 CLINDAMYCIN PER 300 MG: Performed by: SURGERY

## 2025-02-14 PROCEDURE — 25010000002 DEXAMETHASONE PER 1 MG

## 2025-02-14 PROCEDURE — 25010000002 MIDAZOLAM PER 1 MG

## 2025-02-14 PROCEDURE — 25010000002 ENOXAPARIN PER 10 MG: Performed by: SURGERY

## 2025-02-14 PROCEDURE — 85025 COMPLETE CBC W/AUTO DIFF WBC: CPT | Performed by: INTERNAL MEDICINE

## 2025-02-14 RX ORDER — OXYCODONE HYDROCHLORIDE 5 MG/1
5 TABLET ORAL ONCE AS NEEDED
Status: DISCONTINUED | OUTPATIENT
Start: 2025-02-14 | End: 2025-02-14 | Stop reason: HOSPADM

## 2025-02-14 RX ORDER — DEXMEDETOMIDINE HYDROCHLORIDE 100 UG/ML
INJECTION, SOLUTION INTRAVENOUS AS NEEDED
Status: DISCONTINUED | OUTPATIENT
Start: 2025-02-14 | End: 2025-02-14 | Stop reason: SURG

## 2025-02-14 RX ORDER — PROPOFOL 10 MG/ML
VIAL (ML) INTRAVENOUS AS NEEDED
Status: DISCONTINUED | OUTPATIENT
Start: 2025-02-14 | End: 2025-02-14 | Stop reason: SURG

## 2025-02-14 RX ORDER — FENTANYL CITRATE 50 UG/ML
INJECTION, SOLUTION INTRAMUSCULAR; INTRAVENOUS AS NEEDED
Status: DISCONTINUED | OUTPATIENT
Start: 2025-02-14 | End: 2025-02-14 | Stop reason: SURG

## 2025-02-14 RX ORDER — DIPHENHYDRAMINE HYDROCHLORIDE 50 MG/ML
12.5 INJECTION INTRAMUSCULAR; INTRAVENOUS ONCE AS NEEDED
Status: DISCONTINUED | OUTPATIENT
Start: 2025-02-14 | End: 2025-02-14 | Stop reason: HOSPADM

## 2025-02-14 RX ORDER — DEXAMETHASONE SODIUM PHOSPHATE 4 MG/ML
INJECTION, SOLUTION INTRA-ARTICULAR; INTRALESIONAL; INTRAMUSCULAR; INTRAVENOUS; SOFT TISSUE AS NEEDED
Status: DISCONTINUED | OUTPATIENT
Start: 2025-02-14 | End: 2025-02-14 | Stop reason: SURG

## 2025-02-14 RX ORDER — BUPIVACAINE HYDROCHLORIDE 2.5 MG/ML
INJECTION, SOLUTION EPIDURAL; INFILTRATION; INTRACAUDAL AS NEEDED
Status: DISCONTINUED | OUTPATIENT
Start: 2025-02-14 | End: 2025-02-14 | Stop reason: HOSPADM

## 2025-02-14 RX ORDER — CLINDAMYCIN PHOSPHATE 900 MG/50ML
900 INJECTION, SOLUTION INTRAVENOUS ONCE
Status: COMPLETED | OUTPATIENT
Start: 2025-02-14 | End: 2025-02-14

## 2025-02-14 RX ORDER — OXYCODONE HYDROCHLORIDE 5 MG/1
10 TABLET ORAL EVERY 4 HOURS PRN
Status: DISCONTINUED | OUTPATIENT
Start: 2025-02-14 | End: 2025-02-14 | Stop reason: HOSPADM

## 2025-02-14 RX ORDER — NALOXONE HCL 0.4 MG/ML
0.4 VIAL (ML) INJECTION AS NEEDED
Status: DISCONTINUED | OUTPATIENT
Start: 2025-02-14 | End: 2025-02-14 | Stop reason: HOSPADM

## 2025-02-14 RX ORDER — IPRATROPIUM BROMIDE AND ALBUTEROL SULFATE 2.5; .5 MG/3ML; MG/3ML
3 SOLUTION RESPIRATORY (INHALATION) ONCE AS NEEDED
Status: DISCONTINUED | OUTPATIENT
Start: 2025-02-14 | End: 2025-02-14 | Stop reason: HOSPADM

## 2025-02-14 RX ORDER — EPHEDRINE SULFATE 5 MG/ML
5 INJECTION INTRAVENOUS ONCE AS NEEDED
Status: DISCONTINUED | OUTPATIENT
Start: 2025-02-14 | End: 2025-02-14 | Stop reason: HOSPADM

## 2025-02-14 RX ORDER — SODIUM CHLORIDE 9 MG/ML
50 INJECTION, SOLUTION INTRAVENOUS ONCE
Status: DISCONTINUED | OUTPATIENT
Start: 2025-02-14 | End: 2025-02-14 | Stop reason: HOSPADM

## 2025-02-14 RX ORDER — POTASSIUM CHLORIDE 1.5 G/1.58G
20 POWDER, FOR SOLUTION ORAL 2 TIMES DAILY
Status: DISCONTINUED | OUTPATIENT
Start: 2025-02-14 | End: 2025-02-15 | Stop reason: HOSPADM

## 2025-02-14 RX ORDER — KETOROLAC TROMETHAMINE 30 MG/ML
INJECTION, SOLUTION INTRAMUSCULAR; INTRAVENOUS AS NEEDED
Status: DISCONTINUED | OUTPATIENT
Start: 2025-02-14 | End: 2025-02-14 | Stop reason: SURG

## 2025-02-14 RX ORDER — LABETALOL HYDROCHLORIDE 5 MG/ML
5 INJECTION, SOLUTION INTRAVENOUS
Status: DISCONTINUED | OUTPATIENT
Start: 2025-02-14 | End: 2025-02-14 | Stop reason: HOSPADM

## 2025-02-14 RX ORDER — DIPHENHYDRAMINE HYDROCHLORIDE 50 MG/ML
12.5 INJECTION INTRAMUSCULAR; INTRAVENOUS
Status: DISCONTINUED | OUTPATIENT
Start: 2025-02-14 | End: 2025-02-14 | Stop reason: HOSPADM

## 2025-02-14 RX ORDER — MAGNESIUM SULFATE 1 G/100ML
1 INJECTION INTRAVENOUS ONCE
Status: COMPLETED | OUTPATIENT
Start: 2025-02-14 | End: 2025-02-14

## 2025-02-14 RX ORDER — ONDANSETRON 2 MG/ML
INJECTION INTRAMUSCULAR; INTRAVENOUS AS NEEDED
Status: DISCONTINUED | OUTPATIENT
Start: 2025-02-14 | End: 2025-02-14 | Stop reason: SURG

## 2025-02-14 RX ORDER — SODIUM CHLORIDE, SODIUM LACTATE, POTASSIUM CHLORIDE, AND CALCIUM CHLORIDE .6; .31; .03; .02 G/100ML; G/100ML; G/100ML; G/100ML
20 INJECTION, SOLUTION INTRAVENOUS CONTINUOUS
Status: DISPENSED | OUTPATIENT
Start: 2025-02-14 | End: 2025-02-15

## 2025-02-14 RX ORDER — ONDANSETRON 2 MG/ML
4 INJECTION INTRAMUSCULAR; INTRAVENOUS ONCE AS NEEDED
Status: DISCONTINUED | OUTPATIENT
Start: 2025-02-14 | End: 2025-02-14 | Stop reason: HOSPADM

## 2025-02-14 RX ORDER — OXYCODONE HYDROCHLORIDE 5 MG/1
5 TABLET ORAL EVERY 6 HOURS PRN
Status: DISCONTINUED | OUTPATIENT
Start: 2025-02-14 | End: 2025-02-15 | Stop reason: HOSPADM

## 2025-02-14 RX ORDER — MIDAZOLAM HYDROCHLORIDE 1 MG/ML
INJECTION, SOLUTION INTRAMUSCULAR; INTRAVENOUS AS NEEDED
Status: DISCONTINUED | OUTPATIENT
Start: 2025-02-14 | End: 2025-02-14 | Stop reason: SURG

## 2025-02-14 RX ORDER — SODIUM CHLORIDE, SODIUM LACTATE, POTASSIUM CHLORIDE, CALCIUM CHLORIDE 600; 310; 30; 20 MG/100ML; MG/100ML; MG/100ML; MG/100ML
INJECTION, SOLUTION INTRAVENOUS CONTINUOUS PRN
Status: DISCONTINUED | OUTPATIENT
Start: 2025-02-14 | End: 2025-02-14 | Stop reason: SURG

## 2025-02-14 RX ORDER — FLUMAZENIL 0.1 MG/ML
0.2 INJECTION INTRAVENOUS AS NEEDED
Status: DISCONTINUED | OUTPATIENT
Start: 2025-02-14 | End: 2025-02-14 | Stop reason: HOSPADM

## 2025-02-14 RX ORDER — HYDRALAZINE HYDROCHLORIDE 20 MG/ML
5 INJECTION INTRAMUSCULAR; INTRAVENOUS
Status: DISCONTINUED | OUTPATIENT
Start: 2025-02-14 | End: 2025-02-14 | Stop reason: HOSPADM

## 2025-02-14 RX ADMIN — POTASSIUM CHLORIDE 20 MEQ: 1.5 FOR SOLUTION ORAL at 20:05

## 2025-02-14 RX ADMIN — KETOROLAC TROMETHAMINE 30 MG: 30 INJECTION, SOLUTION INTRAMUSCULAR at 09:52

## 2025-02-14 RX ADMIN — ENOXAPARIN SODIUM 40 MG: 100 INJECTION SUBCUTANEOUS at 20:05

## 2025-02-14 RX ADMIN — SULFAMETHOXAZOLE AND TRIMETHOPRIM 1 TABLET: 800; 160 TABLET ORAL at 15:13

## 2025-02-14 RX ADMIN — SODIUM CHLORIDE 50 ML/HR: 9 INJECTION, SOLUTION INTRAVENOUS at 08:15

## 2025-02-14 RX ADMIN — ONDANSETRON 4 MG: 2 INJECTION, SOLUTION INTRAMUSCULAR; INTRAVENOUS at 09:17

## 2025-02-14 RX ADMIN — Medication 10 ML: at 12:21

## 2025-02-14 RX ADMIN — ONDANSETRON 4 MG: 2 INJECTION INTRAMUSCULAR; INTRAVENOUS at 12:19

## 2025-02-14 RX ADMIN — DIPHENOXYLATE HYDROCHLORIDE AND ATROPINE SULFATE 1 TABLET: 2.5; .025 TABLET ORAL at 15:13

## 2025-02-14 RX ADMIN — SULFAMETHOXAZOLE AND TRIMETHOPRIM 1 TABLET: 800; 160 TABLET ORAL at 20:05

## 2025-02-14 RX ADMIN — SODIUM CHLORIDE, SODIUM LACTATE, POTASSIUM CHLORIDE, AND CALCIUM CHLORIDE: .6; .31; .03; .02 INJECTION, SOLUTION INTRAVENOUS at 09:17

## 2025-02-14 RX ADMIN — SODIUM CHLORIDE, SODIUM LACTATE, POTASSIUM CHLORIDE, AND CALCIUM CHLORIDE 20 ML/HR: .6; .31; .03; .02 INJECTION, SOLUTION INTRAVENOUS at 08:32

## 2025-02-14 RX ADMIN — Medication 20 MG: at 09:34

## 2025-02-14 RX ADMIN — PROPOFOL 150 MG: 10 INJECTION, EMULSION INTRAVENOUS at 09:22

## 2025-02-14 RX ADMIN — DEXMEDETOMIDINE HYDROCHLORIDE 6 MCG: 100 INJECTION, SOLUTION, CONCENTRATE INTRAVENOUS at 09:45

## 2025-02-14 RX ADMIN — MAGNESIUM SULFATE HEPTAHYDRATE 1 G: 1 INJECTION, SOLUTION INTRAVENOUS at 15:13

## 2025-02-14 RX ADMIN — POTASSIUM CHLORIDE 20 MEQ: 1.5 FOR SOLUTION ORAL at 15:13

## 2025-02-14 RX ADMIN — HYDROMORPHONE HYDROCHLORIDE 1 MG: 1 INJECTION, SOLUTION INTRAMUSCULAR; INTRAVENOUS; SUBCUTANEOUS at 09:46

## 2025-02-14 RX ADMIN — DIPHENOXYLATE HYDROCHLORIDE AND ATROPINE SULFATE 1 TABLET: 2.5; .025 TABLET ORAL at 20:05

## 2025-02-14 RX ADMIN — POTASSIUM CHLORIDE 40 MEQ: 1500 TABLET, EXTENDED RELEASE ORAL at 03:49

## 2025-02-14 RX ADMIN — DEXAMETHASONE SODIUM PHOSPHATE 8 MG: 4 INJECTION, SOLUTION INTRAMUSCULAR; INTRAVENOUS at 09:00

## 2025-02-14 RX ADMIN — FENTANYL CITRATE 50 MCG: 50 INJECTION, SOLUTION INTRAMUSCULAR; INTRAVENOUS at 09:38

## 2025-02-14 RX ADMIN — DEXMEDETOMIDINE HYDROCHLORIDE 4 MCG: 100 INJECTION, SOLUTION, CONCENTRATE INTRAVENOUS at 09:52

## 2025-02-14 RX ADMIN — MIDAZOLAM 2 MG: 1 INJECTION INTRAMUSCULAR; INTRAVENOUS at 09:17

## 2025-02-14 RX ADMIN — CLINDAMYCIN PHOSPHATE 900 MG: 900 INJECTION, SOLUTION INTRAVENOUS at 09:13

## 2025-02-14 RX ADMIN — Medication 10 ML: at 20:06

## 2025-02-14 RX ADMIN — FENTANYL CITRATE 50 MCG: 50 INJECTION, SOLUTION INTRAMUSCULAR; INTRAVENOUS at 09:22

## 2025-02-14 NOTE — CONSULTS
General Surgery Consult Note      Name: Mary Ann Fletcher ADMIT: 2/10/2025   : 1980  PCP: Leticia Gerber APRN    MRN: [id number] LOS: 3 days   AGE/SEX: 45 y.o. female  ROOM:    [facility]      Patient Care Team:  Leticia Gerber APRN as PCP - General (Nurse Practitioner)  Yanet Lantigua MD as Consulting Physician (Dermatology)  Nora Saldivar MD as Consulting Physician (Hematology and Oncology)  Gonzalez Vanessa MD as Consulting Physician (Breast Surgery)  No chief complaint on file.      HPI  45 y.o. female with breast cancer currently receiving chemotherapy who presents with a 3-day history of nausea and vomiting.  She was also noted to have a right labial/groin abscess.  She reports she started having significant pain in the right groin on 2025.  Progressive worsening however over the last day has somewhat improved since it is started draining some.    Past Medical History:   Diagnosis Date    Anxiety     Cancer     Left Breast         Dr Saldivar    Depression     Obesity     Most adult life     Past Surgical History:   Procedure Laterality Date    BREAST BIOPSY  10/31    Left side    PORTACATH PLACEMENT Right 2024    Procedure: INSERTION OF PORTACATH RIGHT INTERNAL JUGULAR;  Surgeon: Gonzalez Vanessa MD;  Location: [facility];  Service: General;  Laterality: Right;    TONSILLECTOMY      US GUIDED LYMPH NODE BIOPSY  10/31/2024     Family History   Problem Relation Age of Onset    Depression Mother     Diabetes Mother     Depression Sister         Sister    Diabetes Maternal Grandmother         Passed away    Esophageal cancer Maternal Grandmother     Breast cancer Maternal Aunt         Dx 50s       Social History     Tobacco Use    Smoking status: Former     Types: Cigarettes     Start date:      Passive exposure: Past    Smokeless tobacco: Never    Tobacco comments:     On and off for many years early twenties. Quit for kultiple years. Then sporadically last  few years.   Vaping Use    Vaping status: Never Used   Substance Use Topics    Alcohol use: Yes     Alcohol/week: 1.0 standard drink of alcohol     Types: 1 Cans of beer per week     Comment: One beer maybe 1 a month.    Drug use: Never     Medications Prior to Admission   Medication Sig Dispense Refill Last Dose/Taking    buPROPion XL (Wellbutrin XL) 150 MG 24 hr tablet Take 1 tablet by mouth Daily. 90 tablet 1 Past Week    clobetasol (TEMOVATE) 0.05 % external solution Apply  topically to the appropriate area as directed.   Past Month    dexAMETHasone (DECADRON) 4 MG tablet Take 2 tablets oral twice a day for 3 consecutive days beginning the day before chemotherapy and continue for 6 doses. 12 tablet 5 Past Week    diphenoxylate-atropine (LOMOTIL) 2.5-0.025 MG per tablet Take 1 tablet by mouth 3 (Three) Times a Day. 40 tablet 1 Past Week    lidocaine-prilocaine (EMLA) 2.5-2.5 % cream Apply 1 Application topically to the appropriate area as directed Take As Directed. Apply to port 1 hour prior to access 30 g 3 Past Week    OLANZapine (ZyPREXA) 5 MG tablet Take 1 tablet by mouth Every Night. Take nightly for 4 starting night of chemotherapy. 4 tablet 5 Past Week    ondansetron (ZOFRAN) 8 MG tablet Take 1 tablet by mouth 3 (Three) Times a Day As Needed for Nausea or Vomiting. 30 tablet 5 Past Week    pantoprazole (Protonix) 40 MG EC tablet Take 1 tablet by mouth Daily. 30 tablet 6 Past Week    propranolol (INDERAL) 10 MG tablet TAKE 1 TABLET BY MOUTH 3 (THREE) TIMES A DAY AS NEEDED (ANXIETY). 90 tablet 0 Past Month    vilazodone (VIIBRYD) 20 MG tablet tablet Take 1 tablet by mouth Daily. 90 tablet 1 Past Week    metroNIDAZOLE (Flagyl) 500 MG tablet Take 1 tablet by mouth Every 8 (Eight) Hours for 10 days. 30 tablet 0      buPROPion XL, 150 mg, Oral, Daily  clobetasol, , Topical, Q12H  diphenoxylate-atropine, 1 tablet, Oral, TID  enoxaparin, 40 mg, Subcutaneous, Q12H  pantoprazole, 40 mg, Oral, QAM AC  sodium  chloride, 10 mL, Intravenous, Q12H  vilazodone, 20 mg, Oral, Daily           acetaminophen    senna-docusate sodium **AND** polyethylene glycol **AND** bisacodyl **AND** bisacodyl    calcium carbonate    HYDROmorphone **AND** naloxone    ondansetron    oxyCODONE    Potassium Replacement - Follow Nurse / BPA Driven Protocol    propranolol    sodium chloride    sodium chloride  Penicillins    Review of Systems:   As above in HPI    Vitals:  Temp:  [97.3 °F (36.3 °C)-98.8 °F (37.1 °C)] 97.3 °F (36.3 °C)  Heart Rate:  [] 90  Resp:  [15-23] 20  BP: (113-155)/(64-89) 120/73     Physical Exam:   No acute distress, alert  Nonlabored respirations  Right groin region with erythema and induration, fluctuance, area of pinpoint purulent drainage and underlying abscess    Labs:  Results from last 7 days   Lab Units 02/13/25  0610 02/11/25  0606 02/10/25  1316   WBC 10*3/mm3 6.58 6.80 7.93   HEMOGLOBIN g/dL 8.4* 9.9* 13.2   HEMATOCRIT % 26.3* 29.2* 38.6   PLATELETS 10*3/mm3 128* 142 215   MONOCYTES % %  --  10.0  --      Results from last 7 days   Lab Units 02/13/25  1411 02/13/25  0610 02/12/25  1437 02/12/25  1118 02/11/25  1259 02/11/25  0606 02/10/25  2052   SODIUM mmol/L  --  138  --  134*  --  134* 132*   POTASSIUM mmol/L 3.0* 3.2* 3.1* 3.3*   < > 3.0* 3.1*   CHLORIDE mmol/L  --  104  --  102  --  100 92*   CO2 mmol/L  --  23.2  --  21.5*  --  21.5* 21.1*   BUN mg/dL  --  5*  --  8  --  16 26*   CREATININE mg/dL  --  0.83  --  0.79  --  0.85 1.27*   CALCIUM mg/dL  --  8.5*  --  8.6  --  8.6 9.7   BILIRUBIN mg/dL  --   --   --   --   --  0.3 0.6   ALK PHOS U/L  --   --   --   --   --  64 82   ALT (SGPT) U/L  --   --   --   --   --  19 27   AST (SGOT) U/L  --   --   --   --   --  12 16   GLUCOSE mg/dL  --  105*  --  120*  --  109* 150*    < > = values in this interval not displayed.     Imaging:  None applicable    Assessment and Plan:  45 y.o. female with breast cancer receiving chemotherapy admitted for nausea and  vomiting also with right groin abscess.    - Given likely underlying abscess I do feel that unroofing/I&D would be beneficial to allow for better source control  - The procedure along with associated risk, benefits, terms were discussed with the patient; risk discussed include but are not limited to bleeding, infection, poor wound healing  - Patient expressed understanding is agreeable to proceed with I&D in the OR on 2/14/2025  -Will also start p.o. Bactrim as patient is immunocompromised    This note was created using Dragon Voice Recognition software.    Janey Kaminski MD  02/13/25  19:19 EST

## 2025-02-14 NOTE — PAYOR COMM NOTE
"This is clinical for Mary Ann Diana.  Reference/Auth#: [id number]     EXTENDED AUTHORIZATION PENDING:     Please call or fax determination to contact below.   Thank you.    Maria E Luz RN, CCM  Utilization Review Nurse  [id number] [place] [address]   [phone]  Fx [phone]         Mary Ann Diana (45 y.o. Female)       Date of Birth   1980    Social Security Number       Address   [address] [place] [address]    Home Phone   [phone]    MRN   [id number]       Holiness   None    Marital Status   Single                            Admission Date   2/10/25    Admission Type   Urgent    Admitting Provider   Olga Dunn MD    Attending Provider   Gilles Fernández MD    Department, Room/Bed   [facility] PROGRESS CARE, 2132/1       Discharge Date       Discharge Disposition       Discharge Destination                                 Attending Provider: Gilles Fernández MD    Allergies: Penicillins    Isolation: None   Infection: None   Code Status: CPR    Ht: 160 cm (63\")   Wt: 110 kg (242 lb 4.6 oz)    Admission Cmt: None   Principal Problem: Nausea and vomiting [R11.2]                   Active Insurance as of 2/10/2025       Primary Coverage       Payor Plan Insurance Group Employer/Plan Group    [facility] [employer] [facility] [employer] BLUE [facility] PPO [id number]       Payor Plan Address Payor Plan Phone Number Payor Plan Fax Number Effective Dates    [address] [phone]  1/1/2023 - None Entered    [facility] [id number]         Subscriber Name Subscriber Birth Date Member ID       MARY ANN DIANA 1980 [id number]                     Emergency Contacts        (Rel.) Home Phone Work Phone Mobile Phone    jolly diana (Sister) [phone] -- [phone]    Demetris Diana (Mother) -- -- [phone]                 Operative/Procedure Notes (last 24 hours)        Janey Kaminski MD at 02/14/25 0942        "   Operative Report    Patient Name:  Mary Ann Fletcher  YOB: 1980    Date of Surgery:  2/14/2025    Pre-op Diagnosis:   Right groin abscess       Post-op Diagnosis:   Right groin abscess    Procedure(s):  Incision and drainage of right groin abscess    Staff:  Surgeon(s):  Janey Kaminski MD    Circulator: Karlee Reyes RN  Scrub Person: Soumya Montez    Anesthesia: General    Estimated Blood Loss: 10 mL    Implants:    Nothing was implanted during the procedure    Specimen:          None    Findings: Underlying abscess containing purulent fluid approximately 3 cm x 3 cm abscess cavity.    Complications: None immediate    CLINICAL INDICATIONS:  The patient is a 45 year old female with an abscess of the right groin requiring incision and drainage/debridement for source control of the infection.  The surgery along with the risks, benefits, and alternatives to surgery were discussed.  The patient verbalized understanding and wished to proceed with surgery.  Informed consent was obtained.    DESCRIPTION OF PROCEDURE:  The patient was brought to the operating room.  The patient was induced and her airway was secured by the Anesthesia team.  Her right leg was then placed in a stirrup to adequately expose the abscess.  The patient's right groin region was then prepped and draped in the usual sterile fashion.  A time out was performed.    We began by infiltrating the overlying skin and subcutaneous tissues with local anesthetic.  We then made an incision over the area of fluctuance dissecting down with electrocautery into the abscess cavity.  Once the abscess cavity was entered purulent fluid was drained.  A finger was inserted to gently break down loculations and ensure the abscess cavity did not track significantly more.  It did track anteriorly and posteriorly some.  The abscess cavity was irrigated.  Areas of bleeding were cauterized for hemostasis.  Once we are satisfied with adequate hemostasis the  abscess cavity was packed with quarter inch iodoform and then covered with dry dressings.      The patient tolerated the procedure well.  She was subsequently awakened by anesthesia and then transferred to the recovery room in stable condition.  At the completion of the procedure all needle, instrument, and sponge counts were correct.      Janey Kaminski MD     Date: 2025  Time: 09:57 EST    This note was created using Dragon Voice Recognition software.     Electronically signed by Janey Kaminski MD at 25 1001          Physician Progress Notes (last 24 hours)        Gilles Fernández MD at 25 1333              [facility] MEDICINE SERVICE  DAILY PROGRESS NOTE    NAME: Mary Ann Fletcher  : 1980  MRN: [id number]      LOS: 4 days     PROVIDER OF SERVICE: Gilles Fernández MD    Chief Complaint: Nausea and vomiting    Subjective:     Interval History:  History taken from: patient    Diarrhea improving. Patient notes worsened lesion on groin/labial region which grew in size over few days. Seen yesterday by surgery and underwent I/D today. Afebrile        Review of Systems:   Review of Systems   Constitutional:  Negative for chills and fever.   HENT:  Negative for congestion, ear discharge, ear pain, sinus pain and sore throat.    Respiratory:  Negative for apnea, cough, chest tightness, shortness of breath and wheezing.    Cardiovascular:  Negative for chest pain and palpitations.   Gastrointestinal:  Negative for abdominal pain, blood in stool, constipation, diarrhea, nausea and vomiting.   Endocrine: Negative for cold intolerance and heat intolerance.   Genitourinary:  Negative for dysuria, flank pain, hematuria and urgency.   Musculoskeletal:  Negative for arthralgias, back pain, joint swelling, myalgias and neck pain.   Skin: Negative.    Neurological: Negative.    Hematological:  Does not bruise/bleed easily.   Psychiatric/Behavioral:  Negative for agitation, confusion, hallucinations,  sleep disturbance and suicidal ideas. The patient is not nervous/anxious.        Objective:     Vital Signs  Temp:  [97.3 °F (36.3 °C)-98.7 °F (37.1 °C)] 97.6 °F (36.4 °C)  Heart Rate:  [] 97  Resp:  [12-20] 16  BP: (104-139)/(60-82) 115/70  Flow (L/min) (Oxygen Therapy):  [4-6] 4   Body mass index is 42.92 kg/m².    Physical Exam  Physical Exam  Constitutional:       General: She is not in acute distress.     Appearance: Normal appearance.   HENT:      Head: Normocephalic and atraumatic.      Nose: Nose normal.      Mouth/Throat:      Mouth: Mucous membranes are moist.   Eyes:      Extraocular Movements: Extraocular movements intact.      Conjunctiva/sclera: Conjunctivae normal.   Cardiovascular:      Rate and Rhythm: Normal rate and regular rhythm.   Pulmonary:      Effort: Pulmonary effort is normal.      Breath sounds: Normal breath sounds.   Abdominal:      General: Abdomen is flat. Bowel sounds are normal.      Palpations: Abdomen is soft.   Musculoskeletal:         General: Normal range of motion.      Cervical back: Normal range of motion and neck supple.   Skin:     General: Skin is warm and dry.   Neurological:      General: No focal deficit present.      Mental Status: She is alert.   Psychiatric:         Mood and Affect: Mood normal.            Diagnostic Data    Results from last 7 days   Lab Units 02/14/25  0457 02/14/25  0359 02/11/25  1259 02/11/25  0606   WBC 10*3/mm3  --  6.00   < > 6.80   HEMOGLOBIN g/dL  --  9.1*   < > 9.9*   HEMATOCRIT %  --  27.3*   < > 29.2*   PLATELETS 10*3/mm3  --  187   < > 142   GLUCOSE mg/dL 107*  --    < > 109*   CREATININE mg/dL 0.77  --    < > 0.85   BUN mg/dL 4*  --    < > 16   SODIUM mmol/L 140  --    < > 134*   POTASSIUM mmol/L 3.3*  --    < > 3.0*   AST (SGOT) U/L  --   --   --  12   ALT (SGPT) U/L  --   --   --  19   ALK PHOS U/L  --   --   --  64   BILIRUBIN mg/dL  --   --   --  0.3   ANION GAP mmol/L 9.4  --    < > 12.5    < > = values in this interval not  displayed.       No radiology results for the last day      I reviewed the patient's new clinical results.    Assessment/Plan:     Active and Resolved Problems  Active Hospital Problems    Diagnosis  POA    **Nausea and vomiting [R11.2]  Yes      Resolved Hospital Problems   No resolved problems to display.       Nausea vomiting  Diarrhea   -improving  -Likely secondary to the chemotherapy as she has had similar symptoms from her previous chemotherapy sessions  -Continue supportive care  -replace potassium and magnesium  -Continue lomotil for diarrhea  -Zofran as needed for nausea  -Diet as tolerated       Labial lesion/groin abscess- s/p I/D today in OR. Cult pending. Bactrim     Breast cancer.  -She follows up with oncology and is currently on chemotherapy.  -Management per oncology     Anxiety and depression  -Continue home meds.    VTE Prophylaxis:  Pharmacologic & mechanical VTE prophylaxis orders are present.             Disposition Planning:     Barriers to Discharge:jose rushing  Anticipated Date of Discharge: 2/15  Place of Discharge: home      Time: 35 minutes     Code Status and Medical Interventions: CPR (Attempt to Resuscitate); Full Support   Ordered at: 02/10/25 2011     Code Status (Patient has no pulse and is not breathing):    CPR (Attempt to Resuscitate)     Medical Interventions (Patient has pulse or is breathing):    Full Support       Signature: Electronically signed by Gilles Fernández MD, 25, 13:33 EST.  [facility] Hospitalist Team     Electronically signed by Gilles Fernández MD at 25 1336       Gilles Fernández MD at 25 1703              [facility] MEDICINE SERVICE  DAILY PROGRESS NOTE    NAME: Mary Ann Fletcher  : 1980  MRN: [id number]      LOS: 3 days     PROVIDER OF SERVICE: Gilles Fernández MD    Chief Complaint: Nausea and vomiting    Subjective:     Interval History:  History taken from: patient    Diarrhea improved. Less frequent.  Ambulating ok, noted to be tachycardic        Review of Systems:   Review of Systems   Constitutional:  Negative for chills and fever.   HENT:  Negative for congestion, ear discharge, ear pain, sinus pain and sore throat.    Respiratory:  Negative for apnea, cough, chest tightness, shortness of breath and wheezing.    Cardiovascular:  Negative for chest pain and palpitations.   Gastrointestinal:  Positive for diarrhea. Negative for abdominal pain, blood in stool, constipation, nausea and vomiting.   Endocrine: Negative for cold intolerance and heat intolerance.   Genitourinary:  Negative for dysuria, flank pain, hematuria and urgency.   Musculoskeletal:  Negative for arthralgias, back pain, joint swelling, myalgias and neck pain.   Skin: Negative.    Neurological: Negative.    Hematological:  Does not bruise/bleed easily.   Psychiatric/Behavioral:  Negative for agitation, confusion, hallucinations, sleep disturbance and suicidal ideas. The patient is not nervous/anxious.        Objective:     Vital Signs  Temp:  [97.7 °F (36.5 °C)-98.8 °F (37.1 °C)] 97.8 °F (36.6 °C)  Heart Rate:  [] 99  Resp:  [15-23] 22  BP: (113-155)/(64-89) 155/89   Body mass index is 42.84 kg/m².    Physical Exam  Physical Exam  Constitutional:       General: She is not in acute distress.     Appearance: Normal appearance.   HENT:      Head: Normocephalic and atraumatic.      Nose: Nose normal.      Mouth/Throat:      Mouth: Mucous membranes are moist.   Eyes:      Extraocular Movements: Extraocular movements intact.      Conjunctiva/sclera: Conjunctivae normal.   Cardiovascular:      Rate and Rhythm: Normal rate and regular rhythm.   Pulmonary:      Effort: Pulmonary effort is normal.      Breath sounds: Normal breath sounds.   Abdominal:      General: Abdomen is flat. Bowel sounds are normal.      Palpations: Abdomen is soft.   Musculoskeletal:         General: Normal range of motion.      Cervical back: Normal range of motion and neck  supple.   Skin:     General: Skin is warm and dry.   Neurological:      General: No focal deficit present.      Mental Status: She is alert.   Psychiatric:         Mood and Affect: Mood normal.            Diagnostic Data    Results from last 7 days   Lab Units 02/13/25  1411 02/13/25  0610 02/11/25  1259 02/11/25  0606   WBC 10*3/mm3  --  6.58  --  6.80   HEMOGLOBIN g/dL  --  8.4*  --  9.9*   HEMATOCRIT %  --  26.3*  --  29.2*   PLATELETS 10*3/mm3  --  128*  --  142   GLUCOSE mg/dL  --  105*   < > 109*   CREATININE mg/dL  --  0.83   < > 0.85   BUN mg/dL  --  5*   < > 16   SODIUM mmol/L  --  138   < > 134*   POTASSIUM mmol/L 3.0* 3.2*   < > 3.0*   AST (SGOT) U/L  --   --   --  12   ALT (SGPT) U/L  --   --   --  19   ALK PHOS U/L  --   --   --  64   BILIRUBIN mg/dL  --   --   --  0.3   ANION GAP mmol/L  --  10.8   < > 12.5    < > = values in this interval not displayed.       No radiology results for the last day      I reviewed the patient's new clinical results.    Assessment/Plan:     Active and Resolved Problems  Active Hospital Problems    Diagnosis  POA    **Nausea and vomiting [R11.2]  Yes      Resolved Hospital Problems   No resolved problems to display.       Nausea vomiting  Diarrhea   -improving  -Likely secondary to the chemotherapy as she has had similar symptoms from her previous chemotherapy sessions  -Will check c.diff if worsens  -Continue supportive care  -replace potassium and magnesium  -Continue lomotil for diarrhea  -Zofran as needed for nausea  -Diet as tolerated  -given iv fluid boluses today-patient tachycardic, orthostatic vitals ok. Replace mg and K    Labial lesion/groin abscess- Gen surgery to evaluate.      Breast cancer.  -She follows up with oncology and is currently on chemotherapy.  -Management per oncology     Anxiety and depression  -Continue home meds.       VTE Prophylaxis:  Pharmacologic VTE prophylaxis orders are present.             Disposition Planning:     Barriers to  Discharge:surg eval, iv fluids  Anticipated Date of Discharge:   Place of Discharge: home      Time: 45 minutes     Code Status and Medical Interventions: CPR (Attempt to Resuscitate); Full Support   Ordered at: 02/10/25 2011     Code Status (Patient has no pulse and is not breathing):    CPR (Attempt to Resuscitate)     Medical Interventions (Patient has pulse or is breathing):    Full Support       Signature: Electronically signed by Gilles Fernández MD, 25, 17:03 EST.  [facility] Hospitalist Team     Electronically signed by Gilles Fernández MD at 25 0629          Consult Notes (last 24 hours)        Janey Kaminski MD at 25 1918        Consult Orders    1. Inpatient General Surgery Consult [[id number]] ordered by Gilles Fernández MD at 25 1118                   General Surgery Consult Note      Name: Mary Ann Fletcher ADMIT: 2/10/2025   : 1980  PCP: Leticia Gerber APRN    MRN: [id number] LOS: 3 days   AGE/SEX: 45 y.o. female  ROOM: [address] [place] [address]      Patient Care Team:  Leticia Gerber APRN as PCP - General (Nurse Practitioner)  Yanet Lantigua MD as Consulting Physician (Dermatology)  Nora Saldivar MD as Consulting Physician (Hematology and Oncology)  Gonzalez Vanessa MD as Consulting Physician (Breast Surgery)  No chief complaint on file.      HPI  45 y.o. female with breast cancer currently receiving chemotherapy who presents with a 3-day history of nausea and vomiting.  She was also noted to have a right labial/groin abscess.  She reports she started having significant pain in the right groin on 2025.  Progressive worsening however over the last day has somewhat improved since it is started draining some.    Past Medical History:   Diagnosis Date    Anxiety     Cancer     Left Breast         Dr Saldivar    Depression     Obesity     Most adult life     Past Surgical History:   Procedure Laterality Date    BREAST BIOPSY  10/31    Left  side    PORTACATH PLACEMENT Right 11/25/2024    Procedure: INSERTION OF PORTACATH RIGHT INTERNAL JUGULAR;  Surgeon: Gonzalez Vanessa MD;  Location: [facility] MAIN OR;  Service: General;  Laterality: Right;    TONSILLECTOMY      US GUIDED LYMPH NODE BIOPSY  10/31/2024     Family History   Problem Relation Age of Onset    Depression Mother     Diabetes Mother     Depression Sister         Sister    Diabetes Maternal Grandmother         Passed away    Esophageal cancer Maternal Grandmother     Breast cancer Maternal Aunt         Dx 50s       Social History     Tobacco Use    Smoking status: Former     Types: Cigarettes     Start date: 2022     Passive exposure: Past    Smokeless tobacco: Never    Tobacco comments:     On and off for many years early twenties. Quit for kultiple years. Then sporadically last few years.   Vaping Use    Vaping status: Never Used   Substance Use Topics    Alcohol use: Yes     Alcohol/week: 1.0 standard drink of alcohol     Types: 1 Cans of beer per week     Comment: One beer maybe 1 a month.    Drug use: Never     Medications Prior to Admission   Medication Sig Dispense Refill Last Dose/Taking    buPROPion XL (Wellbutrin XL) 150 MG 24 hr tablet Take 1 tablet by mouth Daily. 90 tablet 1 Past Week    clobetasol (TEMOVATE) 0.05 % external solution Apply  topically to the appropriate area as directed.   Past Month    dexAMETHasone (DECADRON) 4 MG tablet Take 2 tablets oral twice a day for 3 consecutive days beginning the day before chemotherapy and continue for 6 doses. 12 tablet 5 Past Week    diphenoxylate-atropine (LOMOTIL) 2.5-0.025 MG per tablet Take 1 tablet by mouth 3 (Three) Times a Day. 40 tablet 1 Past Week    lidocaine-prilocaine (EMLA) 2.5-2.5 % cream Apply 1 Application topically to the appropriate area as directed Take As Directed. Apply to port 1 hour prior to access 30 g 3 Past Week    OLANZapine (ZyPREXA) 5 MG tablet Take 1 tablet by mouth Every Night. Take nightly for 4  starting night of chemotherapy. 4 tablet 5 Past Week    ondansetron (ZOFRAN) 8 MG tablet Take 1 tablet by mouth 3 (Three) Times a Day As Needed for Nausea or Vomiting. 30 tablet 5 Past Week    pantoprazole (Protonix) 40 MG EC tablet Take 1 tablet by mouth Daily. 30 tablet 6 Past Week    propranolol (INDERAL) 10 MG tablet TAKE 1 TABLET BY MOUTH 3 (THREE) TIMES A DAY AS NEEDED (ANXIETY). 90 tablet 0 Past Month    vilazodone (VIIBRYD) 20 MG tablet tablet Take 1 tablet by mouth Daily. 90 tablet 1 Past Week    metroNIDAZOLE (Flagyl) 500 MG tablet Take 1 tablet by mouth Every 8 (Eight) Hours for 10 days. 30 tablet 0      buPROPion XL, 150 mg, Oral, Daily  clobetasol, , Topical, Q12H  diphenoxylate-atropine, 1 tablet, Oral, TID  enoxaparin, 40 mg, Subcutaneous, Q12H  pantoprazole, 40 mg, Oral, QAM AC  sodium chloride, 10 mL, Intravenous, Q12H  vilazodone, 20 mg, Oral, Daily           acetaminophen    senna-docusate sodium **AND** polyethylene glycol **AND** bisacodyl **AND** bisacodyl    calcium carbonate    HYDROmorphone **AND** naloxone    ondansetron    oxyCODONE    Potassium Replacement - Follow Nurse / BPA Driven Protocol    propranolol    sodium chloride    sodium chloride  Penicillins    Review of Systems:   As above in HPI    Vitals:  Temp:  [97.3 °F (36.3 °C)-98.8 °F (37.1 °C)] 97.3 °F (36.3 °C)  Heart Rate:  [] 90  Resp:  [15-23] 20  BP: (113-155)/(64-89) 120/73     Physical Exam:   No acute distress, alert  Nonlabored respirations  Right groin region with erythema and induration, fluctuance, area of pinpoint purulent drainage and underlying abscess    Labs:  Results from last 7 days   Lab Units 02/13/25  0610 02/11/25  0606 02/10/25  1316   WBC 10*3/mm3 6.58 6.80 7.93   HEMOGLOBIN g/dL 8.4* 9.9* 13.2   HEMATOCRIT % 26.3* 29.2* 38.6   PLATELETS 10*3/mm3 128* 142 215   MONOCYTES % %  --  10.0  --      Results from last 7 days   Lab Units 02/13/25  1411 02/13/25  0610 02/12/25  1437 02/12/25  1118  02/11/25  1259 02/11/25  0606 02/10/25  2052   SODIUM mmol/L  --  138  --  134*  --  134* 132*   POTASSIUM mmol/L 3.0* 3.2* 3.1* 3.3*   < > 3.0* 3.1*   CHLORIDE mmol/L  --  104  --  102  --  100 92*   CO2 mmol/L  --  23.2  --  21.5*  --  21.5* 21.1*   BUN mg/dL  --  5*  --  8  --  16 26*   CREATININE mg/dL  --  0.83  --  0.79  --  0.85 1.27*   CALCIUM mg/dL  --  8.5*  --  8.6  --  8.6 9.7   BILIRUBIN mg/dL  --   --   --   --   --  0.3 0.6   ALK PHOS U/L  --   --   --   --   --  64 82   ALT (SGPT) U/L  --   --   --   --   --  19 27   AST (SGOT) U/L  --   --   --   --   --  12 16   GLUCOSE mg/dL  --  105*  --  120*  --  109* 150*    < > = values in this interval not displayed.     Imaging:  None applicable    Assessment and Plan:  45 y.o. female with breast cancer receiving chemotherapy admitted for nausea and vomiting also with right groin abscess.    - Given likely underlying abscess I do feel that unroofing/I&D would be beneficial to allow for better source control  - The procedure along with associated risk, benefits, terms were discussed with the patient; risk discussed include but are not limited to bleeding, infection, poor wound healing  - Patient expressed understanding is agreeable to proceed with I&D in the OR on 2/14/2025  -Will also start p.o. Bactrim as patient is immunocompromised    This note was created using Dragon Voice Recognition software.    Janey Kaminski MD  02/13/25  19:19 EST     Electronically signed by Janey Kaminski MD at 02/13/25 6811

## 2025-02-14 NOTE — ANESTHESIA POSTPROCEDURE EVALUATION
Patient: Mary Ann Fletcher    Procedure Summary       Date: 02/14/25 Room / Location: [facility] OR [address] [place] [address] MAIN OR    Anesthesia Start: 0917 Anesthesia Stop: 1001    Procedure: INCISION AND DRAINAGE groin abscess (Right) Diagnosis:     Surgeons: Janey Kaminski MD Provider: Castillo Chandra MD    Anesthesia Type: general ASA Status: 3            Anesthesia Type: general    Vitals  Vitals Value Taken Time   /70 02/14/25 1051   Temp 97.6 °F (36.4 °C) 02/14/25 1051   Pulse 98 02/14/25 1052   Resp 16 02/14/25 1051   SpO2 94 % 02/14/25 1052   Vitals shown include unfiled device data.        Post Anesthesia Care and Evaluation    Patient location during evaluation: PACU  Patient participation: complete - patient participated  Level of consciousness: awake  Pain scale: See nurse's notes for pain score.  Pain management: adequate    Airway patency: patent  Anesthetic complications: No anesthetic complications  PONV Status: none  Cardiovascular status: acceptable  Respiratory status: acceptable and spontaneous ventilation  Hydration status: acceptable    Comments: Patient seen and examined postoperatively; vital signs stable; SpO2 greater than or equal to 90%; cardiopulmonary status stable; nausea/vomiting adequately controlled; pain adequately controlled; no apparent anesthesia complications; patient discharged from anesthesia care when discharge criteria were met

## 2025-02-14 NOTE — PLAN OF CARE
Goal Outcome Evaluation:           Pt has been NPO since midnight and consent has been signed. Pt still c/o nausea and has a headache as well. K+ rechecked and it was still low at 2.8. Replacement protocol initiated once again. HR remains tachy but other VSS on RA.

## 2025-02-14 NOTE — PROGRESS NOTES
[facility] MEDICINE SERVICE  DAILY PROGRESS NOTE    NAME: Mary Ann Fletcher  : 1980  MRN: [id number]      LOS: 4 days     PROVIDER OF SERVICE: Gilles Fernández MD    Chief Complaint: Nausea and vomiting    Subjective:     Interval History:  History taken from: patient    Diarrhea improving. Patient notes worsened lesion on groin/labial region which grew in size over few days. Seen yesterday by surgery and underwent I/D today. Afebrile        Review of Systems:   Review of Systems   Constitutional:  Negative for chills and fever.   HENT:  Negative for congestion, ear discharge, ear pain, sinus pain and sore throat.    Respiratory:  Negative for apnea, cough, chest tightness, shortness of breath and wheezing.    Cardiovascular:  Negative for chest pain and palpitations.   Gastrointestinal:  Negative for abdominal pain, blood in stool, constipation, diarrhea, nausea and vomiting.   Endocrine: Negative for cold intolerance and heat intolerance.   Genitourinary:  Negative for dysuria, flank pain, hematuria and urgency.   Musculoskeletal:  Negative for arthralgias, back pain, joint swelling, myalgias and neck pain.   Skin: Negative.    Neurological: Negative.    Hematological:  Does not bruise/bleed easily.   Psychiatric/Behavioral:  Negative for agitation, confusion, hallucinations, sleep disturbance and suicidal ideas. The patient is not nervous/anxious.        Objective:     Vital Signs  Temp:  [97.3 °F (36.3 °C)-98.7 °F (37.1 °C)] 97.6 °F (36.4 °C)  Heart Rate:  [] 97  Resp:  [12-20] 16  BP: (104-139)/(60-82) 115/70  Flow (L/min) (Oxygen Therapy):  [4-6] 4   Body mass index is 42.92 kg/m².    Physical Exam  Physical Exam  Constitutional:       General: She is not in acute distress.     Appearance: Normal appearance.   HENT:      Head: Normocephalic and atraumatic.      Nose: Nose normal.      Mouth/Throat:      Mouth: Mucous membranes are moist.   Eyes:      Extraocular Movements: Extraocular  movements intact.      Conjunctiva/sclera: Conjunctivae normal.   Cardiovascular:      Rate and Rhythm: Normal rate and regular rhythm.   Pulmonary:      Effort: Pulmonary effort is normal.      Breath sounds: Normal breath sounds.   Abdominal:      General: Abdomen is flat. Bowel sounds are normal.      Palpations: Abdomen is soft.   Musculoskeletal:         General: Normal range of motion.      Cervical back: Normal range of motion and neck supple.   Skin:     General: Skin is warm and dry.   Neurological:      General: No focal deficit present.      Mental Status: She is alert.   Psychiatric:         Mood and Affect: Mood normal.            Diagnostic Data    Results from last 7 days   Lab Units 02/14/25  0457 02/14/25  0359 02/11/25  1259 02/11/25  0606   WBC 10*3/mm3  --  6.00   < > 6.80   HEMOGLOBIN g/dL  --  9.1*   < > 9.9*   HEMATOCRIT %  --  27.3*   < > 29.2*   PLATELETS 10*3/mm3  --  187   < > 142   GLUCOSE mg/dL 107*  --    < > 109*   CREATININE mg/dL 0.77  --    < > 0.85   BUN mg/dL 4*  --    < > 16   SODIUM mmol/L 140  --    < > 134*   POTASSIUM mmol/L 3.3*  --    < > 3.0*   AST (SGOT) U/L  --   --   --  12   ALT (SGPT) U/L  --   --   --  19   ALK PHOS U/L  --   --   --  64   BILIRUBIN mg/dL  --   --   --  0.3   ANION GAP mmol/L 9.4  --    < > 12.5    < > = values in this interval not displayed.       No radiology results for the last day      I reviewed the patient's new clinical results.    Assessment/Plan:     Active and Resolved Problems  Active Hospital Problems    Diagnosis  POA    **Nausea and vomiting [R11.2]  Yes      Resolved Hospital Problems   No resolved problems to display.       Nausea vomiting  Diarrhea   -improving  -Likely secondary to the chemotherapy as she has had similar symptoms from her previous chemotherapy sessions  -Continue supportive care  -replace potassium and magnesium  -Continue lomotil for diarrhea  -Zofran as needed for nausea  -Diet as tolerated       Labial  lesion/groin abscess- s/p I/D today in OR. Cult pending. Bactrim     Breast cancer.  -She follows up with oncology and is currently on chemotherapy.  -Management per oncology     Anxiety and depression  -Continue home meds.    VTE Prophylaxis:  Pharmacologic & mechanical VTE prophylaxis orders are present.             Disposition Planning:     Barriers to Discharge:jose rushing  Anticipated Date of Discharge: 2/15  Place of Discharge: home      Time: 35 minutes     Code Status and Medical Interventions: CPR (Attempt to Resuscitate); Full Support   Ordered at: 02/10/25 2011     Code Status (Patient has no pulse and is not breathing):    CPR (Attempt to Resuscitate)     Medical Interventions (Patient has pulse or is breathing):    Full Support       Signature: Electronically signed by Gilles Fernández MD, 02/14/25, 13:33 EST.  [facility] Hospitalist Team

## 2025-02-14 NOTE — CASE MANAGEMENT/SOCIAL WORK
Continued Stay Note  [facility]     Patient Name: Mary Ann Fletcher  MRN: [id number]  Today's Date: 2/14/2025    Admit Date: 2/10/2025    Plan: Anticipate routine home with family. Watch IV abx needs.   Discharge Plan       Row Name 02/14/25 1236       Plan    Plan Anticipate routine home with family. Watch IV abx needs.    Plan Comments Barrier to D/C: IV clindamycin, I&D groin abscess today, replacing electrolytes.                      Expected Discharge Date and Time       Expected Discharge Date Expected Discharge Time    Feb 16, 2025           Phone communication or documentation only - no physical contact with patient or family.     Zaria Scott, [place], [place] [address]    Office: [phone]  Fax: [phone]

## 2025-02-14 NOTE — OP NOTE
Operative Report    Patient Name:  Mary Ann Fletcher  YOB: 1980    Date of Surgery:  2/14/2025    Pre-op Diagnosis:   Right groin abscess       Post-op Diagnosis:   Right groin abscess    Procedure(s):  Incision and drainage of right groin abscess    Staff:  Surgeon(s):  Janey Kaminski MD    Circulator: Karlee Reyes RN  Scrub Person: Soumya Montez    Anesthesia: General    Estimated Blood Loss: 10 mL    Implants:    Nothing was implanted during the procedure    Specimen:          None    Findings: Underlying abscess containing purulent fluid approximately 3 cm x 3 cm abscess cavity.    Complications: None immediate    CLINICAL INDICATIONS:  The patient is a 45 year old female with an abscess of the right groin requiring incision and drainage/debridement for source control of the infection.  The surgery along with the risks, benefits, and alternatives to surgery were discussed.  The patient verbalized understanding and wished to proceed with surgery.  Informed consent was obtained.    DESCRIPTION OF PROCEDURE:  The patient was brought to the operating room.  The patient was induced and her airway was secured by the Anesthesia team.  Her right leg was then placed in a stirrup to adequately expose the abscess.  The patient's right groin region was then prepped and draped in the usual sterile fashion.  A time out was performed.    We began by infiltrating the overlying skin and subcutaneous tissues with local anesthetic.  We then made an incision over the area of fluctuance dissecting down with electrocautery into the abscess cavity.  Once the abscess cavity was entered purulent fluid was drained.  A finger was inserted to gently break down loculations and ensure the abscess cavity did not track significantly more.  It did track anteriorly and posteriorly some.  The abscess cavity was irrigated.  Areas of bleeding were cauterized for hemostasis.  Once we are satisfied with adequate hemostasis the  abscess cavity was packed with quarter inch iodoform and then covered with dry dressings.      The patient tolerated the procedure well.  She was subsequently awakened by anesthesia and then transferred to the recovery room in stable condition.  At the completion of the procedure all needle, instrument, and sponge counts were correct.      Janey Kaminski MD     Date: 2/14/2025  Time: 09:57 EST    This note was created using Dragon Voice Recognition software.

## 2025-02-14 NOTE — ANESTHESIA PROCEDURE NOTES
Airway  Urgency: elective    Date/Time: 2/14/2025 9:27 AM  Airway not difficult    General Information and Staff    Patient location during procedure: OR  Anesthesiologist: Castillo Chandra MD  CRNA/CAA: Jordana Tran CRNA    Indications and Patient Condition  Indications for airway management: airway protection    Preoxygenated: yes  Mask difficulty assessment: 0 - not attempted    Final Airway Details  Final airway type: supraglottic airway      Successful airway: LMA and I-gel  Size 4     Number of attempts at approach: 1  Assessment: lips, teeth, and gum same as pre-op and atraumatic intubation

## 2025-02-14 NOTE — ANESTHESIA PREPROCEDURE EVALUATION
Anesthesia Evaluation     NPO Solid Status: > 8 hours  NPO Liquid Status: > 8 hours           Airway   Mallampati: I  TM distance: >3 FB  Neck ROM: full  No difficulty expected  Dental - normal exam     Pulmonary - normal exam   Cardiovascular - normal exam        Neuro/Psych  (+) dizziness/light headedness, psychiatric history  GI/Hepatic/Renal/Endo    (+) obesity, morbid obesity, liver disease    ROS Comment: 45 y.o. female with breast cancer currently receiving chemotherapy who presents with a 3-day history of nausea and vomiting.  She was also noted to have a right labial/groin abscess.  She reports she started having significant pain in the right groin on 2/9/2025.  Progressive worsening however over the last day has somewhat improved since it is started draining some.    Musculoskeletal     Abdominal  - normal exam    Bowel sounds: normal.   Substance History      OB/GYN          Other   arthritis,   history of cancer    ROS/Med Hx Other: PORT PLACEMENT 11/2024 IGEL4  NAUSEA/VOMITING ON ADMISSION HAS SINCE RESOLVED. EATING NORMALLY YESTERDAY WITHOUT NV.   TREATMENT RELATED DIARRHEA. HYPOKALEMIA. IMPROVING WITH SUPPLEMENTATION              Anesthesia Plan    ASA 3     general     (CONSIDER K SUPPLEMENT INTRAOP)  intravenous induction     Anesthetic plan, risks, benefits, and alternatives have been provided, discussed and informed consent has been obtained with: patient.  Pre-procedure education provided  Plan discussed with CRNA.    CODE STATUS:    Code Status (Patient has no pulse and is not breathing): CPR (Attempt to Resuscitate)  Medical Interventions (Patient has pulse or is breathing): Full Support

## 2025-02-14 NOTE — PLAN OF CARE
Problem: Adult Inpatient Plan of Care  Goal: Absence of Hospital-Acquired Illness or Injury  Intervention: Identify and Manage Fall Risk  Recent Flowsheet Documentation  Taken 2/14/2025 1600 by Melony Escalante LPN  Safety Promotion/Fall Prevention:   safety round/check completed   room organization consistent   clutter free environment maintained   assistive device/personal items within reach  Taken 2/14/2025 1400 by Melony Escalante LPN  Safety Promotion/Fall Prevention:   safety round/check completed   room organization consistent   assistive device/personal items within reach   clutter free environment maintained  Taken 2/14/2025 1200 by Melony Escalante LPN  Safety Promotion/Fall Prevention:   safety round/check completed   room organization consistent   assistive device/personal items within reach   clutter free environment maintained  Intervention: Prevent Skin Injury  Recent Flowsheet Documentation  Taken 2/14/2025 1600 by Melony Escalante LPN  Body Position: position changed independently  Skin Protection: transparent dressing maintained  Taken 2/14/2025 1200 by Melony Escalante LPN  Body Position: position changed independently  Skin Protection: transparent dressing maintained  Taken 2/14/2025 0800 by Melony Escalante LPN  Body Position: position changed independently  Skin Protection: transparent dressing maintained  Intervention: Prevent and Manage VTE (Venous Thromboembolism) Risk  Recent Flowsheet Documentation  Taken 2/14/2025 1600 by Melony Escalante LPN  VTE Prevention/Management:   bilateral   patient refused intervention   SCDs (sequential compression devices) off  Taken 2/14/2025 1200 by Melony Escalante LPN  VTE Prevention/Management: (up ad effie)   patient refused intervention   SCDs (sequential compression devices) off   bilateral  Taken 2/14/2025 0800 by Melony Escalante LPN  VTE Prevention/Management:   patient refused intervention   SCDs (sequential compression devices) off    bilateral  Intervention: Prevent Infection  Recent Flowsheet Documentation  Taken 2/14/2025 1600 by Melony Escalante LPN  Infection Prevention: environmental surveillance performed  Taken 2/14/2025 1400 by Melony Escalante LPN  Infection Prevention:   rest/sleep promoted   environmental surveillance performed   hand hygiene promoted   single patient room provided  Taken 2/14/2025 1200 by Melony Escalante LPN  Infection Prevention: environmental surveillance performed  Taken 2/14/2025 0800 by Melony Escalante LPN  Infection Prevention: environmental surveillance performed  Goal: Optimal Comfort and Wellbeing  Intervention: Provide Person-Centered Care  Recent Flowsheet Documentation  Taken 2/14/2025 1600 by Melony Escalante LPN  Trust Relationship/Rapport:   care explained   questions encouraged   questions answered  Taken 2/14/2025 1200 by Melony Escalante LPN  Trust Relationship/Rapport:   care explained   questions answered   questions encouraged  Taken 2/14/2025 0800 by Melony Escalante LPN  Trust Relationship/Rapport:   care explained   questions encouraged   questions answered   Goal Outcome Evaluation:

## 2025-02-15 ENCOUNTER — READMISSION MANAGEMENT (OUTPATIENT)
Dept: CALL CENTER | Facility: HOSPITAL | Age: 45
End: 2025-02-15
Payer: COMMERCIAL

## 2025-02-15 VITALS
DIASTOLIC BLOOD PRESSURE: 71 MMHG | RESPIRATION RATE: 20 BRPM | HEIGHT: 63 IN | WEIGHT: 250.66 LBS | BODY MASS INDEX: 44.41 KG/M2 | OXYGEN SATURATION: 97 % | TEMPERATURE: 97.6 F | HEART RATE: 94 BPM | SYSTOLIC BLOOD PRESSURE: 117 MMHG

## 2025-02-15 LAB
ANION GAP SERPL CALCULATED.3IONS-SCNC: 8.8 MMOL/L (ref 5–15)
BASOPHILS # BLD AUTO: 0.01 10*3/MM3 (ref 0–0.2)
BASOPHILS NFR BLD AUTO: 0.1 % (ref 0–1.5)
BUN SERPL-MCNC: 5 MG/DL (ref 6–20)
BUN/CREAT SERPL: 6.3 (ref 7–25)
CALCIUM SPEC-SCNC: 8.9 MG/DL (ref 8.6–10.5)
CHLORIDE SERPL-SCNC: 104 MMOL/L (ref 98–107)
CO2 SERPL-SCNC: 26.2 MMOL/L (ref 22–29)
CREAT SERPL-MCNC: 0.79 MG/DL (ref 0.57–1)
DEPRECATED RDW RBC AUTO: 60.2 FL (ref 37–54)
EGFRCR SERPLBLD CKD-EPI 2021: 95.3 ML/MIN/1.73
EOSINOPHIL # BLD AUTO: 0 10*3/MM3 (ref 0–0.4)
EOSINOPHIL NFR BLD AUTO: 0 % (ref 0.3–6.2)
ERYTHROCYTE [DISTWIDTH] IN BLOOD BY AUTOMATED COUNT: 16.2 % (ref 12.3–15.4)
GLUCOSE SERPL-MCNC: 123 MG/DL (ref 65–99)
HCT VFR BLD AUTO: 26.1 % (ref 34–46.6)
HGB BLD-MCNC: 8.4 G/DL (ref 12–15.9)
IMM GRANULOCYTES # BLD AUTO: 0.06 10*3/MM3 (ref 0–0.05)
IMM GRANULOCYTES NFR BLD AUTO: 0.8 % (ref 0–0.5)
LYMPHOCYTES # BLD AUTO: 2.39 10*3/MM3 (ref 0.7–3.1)
LYMPHOCYTES NFR BLD AUTO: 30.4 % (ref 19.6–45.3)
MCH RBC QN AUTO: 32.8 PG (ref 26.6–33)
MCHC RBC AUTO-ENTMCNC: 32.2 G/DL (ref 31.5–35.7)
MCV RBC AUTO: 102 FL (ref 79–97)
MONOCYTES # BLD AUTO: 0.27 10*3/MM3 (ref 0.1–0.9)
MONOCYTES NFR BLD AUTO: 3.4 % (ref 5–12)
NEUTROPHILS NFR BLD AUTO: 5.14 10*3/MM3 (ref 1.7–7)
NEUTROPHILS NFR BLD AUTO: 65.3 % (ref 42.7–76)
NRBC BLD AUTO-RTO: 0 /100 WBC (ref 0–0.2)
PLATELET # BLD AUTO: 126 10*3/MM3 (ref 140–450)
PMV BLD AUTO: 10.6 FL (ref 6–12)
POTASSIUM SERPL-SCNC: 3.6 MMOL/L (ref 3.5–5.2)
RBC # BLD AUTO: 2.56 10*6/MM3 (ref 3.77–5.28)
SODIUM SERPL-SCNC: 139 MMOL/L (ref 136–145)
WBC NRBC COR # BLD AUTO: 7.87 10*3/MM3 (ref 3.4–10.8)

## 2025-02-15 PROCEDURE — 80048 BASIC METABOLIC PNL TOTAL CA: CPT | Performed by: INTERNAL MEDICINE

## 2025-02-15 PROCEDURE — 85025 COMPLETE CBC W/AUTO DIFF WBC: CPT | Performed by: INTERNAL MEDICINE

## 2025-02-15 PROCEDURE — 25010000002 ENOXAPARIN PER 10 MG: Performed by: SURGERY

## 2025-02-15 PROCEDURE — 25010000002 HEPARIN LOCK FLUSH PER 10 UNITS: Performed by: INTERNAL MEDICINE

## 2025-02-15 RX ORDER — SULFAMETHOXAZOLE AND TRIMETHOPRIM 800; 160 MG/1; MG/1
1 TABLET ORAL EVERY 12 HOURS SCHEDULED
Qty: 10 TABLET | Refills: 0 | Status: SHIPPED | OUTPATIENT
Start: 2025-02-15 | End: 2025-02-20

## 2025-02-15 RX ORDER — POTASSIUM CHLORIDE 1.5 G/1.58G
40 POWDER, FOR SOLUTION ORAL EVERY 4 HOURS
Status: COMPLETED | OUTPATIENT
Start: 2025-02-15 | End: 2025-02-15

## 2025-02-15 RX ORDER — HEPARIN SODIUM (PORCINE) LOCK FLUSH IV SOLN 100 UNIT/ML 100 UNIT/ML
5 SOLUTION INTRAVENOUS AS NEEDED
Status: DISCONTINUED | OUTPATIENT
Start: 2025-02-15 | End: 2025-02-15 | Stop reason: HOSPADM

## 2025-02-15 RX ADMIN — SULFAMETHOXAZOLE AND TRIMETHOPRIM 1 TABLET: 800; 160 TABLET ORAL at 09:44

## 2025-02-15 RX ADMIN — POTASSIUM CHLORIDE 40 MEQ: 1.5 FOR SOLUTION ORAL at 07:01

## 2025-02-15 RX ADMIN — VILAZODONE HYDROCHLORIDE 20 MG: 10 TABLET, FILM COATED ORAL at 09:44

## 2025-02-15 RX ADMIN — ACETAMINOPHEN 650 MG: 325 TABLET, FILM COATED ORAL at 07:07

## 2025-02-15 RX ADMIN — POTASSIUM CHLORIDE 20 MEQ: 1.5 FOR SOLUTION ORAL at 09:44

## 2025-02-15 RX ADMIN — Medication 10 ML: at 09:46

## 2025-02-15 RX ADMIN — PANTOPRAZOLE SODIUM 40 MG: 40 TABLET, DELAYED RELEASE ORAL at 09:44

## 2025-02-15 RX ADMIN — BUPROPION HYDROCHLORIDE 150 MG: 150 TABLET, EXTENDED RELEASE ORAL at 09:44

## 2025-02-15 RX ADMIN — DIPHENOXYLATE HYDROCHLORIDE AND ATROPINE SULFATE 1 TABLET: 2.5; .025 TABLET ORAL at 09:44

## 2025-02-15 RX ADMIN — Medication 500 UNITS: at 13:20

## 2025-02-15 RX ADMIN — POTASSIUM CHLORIDE 40 MEQ: 1.5 FOR SOLUTION ORAL at 12:07

## 2025-02-15 RX ADMIN — ENOXAPARIN SODIUM 40 MG: 100 INJECTION SUBCUTANEOUS at 09:44

## 2025-02-15 NOTE — PROGRESS NOTES
[facility] MEDICINE SERVICE  DAILY PROGRESS NOTE    NAME: Mary Ann Fletcher  : 1980  MRN: [id number]      LOS: 5 days     PROVIDER OF SERVICE: Branden Becerra MD    Chief Complaint: Nausea and vomiting    Subjective:     Interval History:  History taken from: patient            Review of Systems:   Review of Systems   Constitutional:  Negative for chills and fever.   HENT:  Negative for congestion, ear discharge, ear pain, sinus pain and sore throat.    Respiratory:  Negative for apnea, cough, chest tightness, shortness of breath and wheezing.    Cardiovascular:  Negative for chest pain and palpitations.   Gastrointestinal:  Negative for abdominal pain, blood in stool, constipation, diarrhea, nausea and vomiting.   Endocrine: Negative for cold intolerance and heat intolerance.   Genitourinary:  Negative for dysuria, flank pain, hematuria and urgency.   Musculoskeletal:  Negative for arthralgias, back pain, joint swelling, myalgias and neck pain.   Skin: Negative.    Neurological: Negative.    Hematological:  Does not bruise/bleed easily.   Psychiatric/Behavioral:  Negative for agitation, confusion, hallucinations, sleep disturbance and suicidal ideas. The patient is not nervous/anxious.        Objective:     Vital Signs  Temp:  [97.1 °F (36.2 °C)-97.9 °F (36.6 °C)] 97.1 °F (36.2 °C)  Heart Rate:  [] 100  Resp:  [16-21] 21  BP: (101-117)/(50-86) 117/71  Flow (L/min) (Oxygen Therapy):  [4] 4   Body mass index is 44.4 kg/m².    Physical Exam  Physical Exam  Constitutional:       General: She is not in acute distress.     Appearance: Normal appearance.   HENT:      Head: Normocephalic and atraumatic.      Nose: Nose normal.      Mouth/Throat:      Mouth: Mucous membranes are moist.   Eyes:      Extraocular Movements: Extraocular movements intact.      Conjunctiva/sclera: Conjunctivae normal.   Cardiovascular:      Rate and Rhythm: Normal rate and regular rhythm.   Pulmonary:      Effort: Pulmonary effort is  normal.      Breath sounds: Normal breath sounds.   Abdominal:      General: Abdomen is flat. Bowel sounds are normal.      Palpations: Abdomen is soft.   Musculoskeletal:         General: Normal range of motion.      Cervical back: Normal range of motion and neck supple.   Skin:     General: Skin is warm and dry.   Neurological:      General: No focal deficit present.      Mental Status: She is alert.   Psychiatric:         Mood and Affect: Mood normal.            Diagnostic Data    Results from last 7 days   Lab Units 02/15/25  0452 02/11/25  1259 02/11/25  0606   WBC 10*3/mm3 7.87   < > 6.80   HEMOGLOBIN g/dL 8.4*   < > 9.9*   HEMATOCRIT % 26.1*   < > 29.2*   PLATELETS 10*3/mm3 126*   < > 142   GLUCOSE mg/dL 123*   < > 109*   CREATININE mg/dL 0.79   < > 0.85   BUN mg/dL 5*   < > 16   SODIUM mmol/L 139   < > 134*   POTASSIUM mmol/L 3.6   < > 3.0*   AST (SGOT) U/L  --   --  12   ALT (SGPT) U/L  --   --  19   ALK PHOS U/L  --   --  64   BILIRUBIN mg/dL  --   --  0.3   ANION GAP mmol/L 8.8   < > 12.5    < > = values in this interval not displayed.       No radiology results for the last day      I reviewed the patient's new clinical results.    Assessment/Plan:     Active and Resolved Problems  Active Hospital Problems    Diagnosis  POA    **Nausea and vomiting [R11.2]  Yes      Resolved Hospital Problems   No resolved problems to display.       Nausea vomiting  Diarrhea   -improving  -Likely secondary to the chemotherapy as she has had similar symptoms from her previous chemotherapy sessions  -Continue supportive care  -replace potassium and magnesium  -Continue lomotil for diarrhea  -Zofran as needed for nausea  -Diet as tolerated       Labial lesion/groin abscess- s/p I/D today in OR on 2/14/25      Breast cancer.  -She follows up with oncology and is currently on chemotherapy.  -Management per oncology     Anxiety and depression  -Continue home meds.    VTE Prophylaxis:  Pharmacologic & mechanical VTE prophylaxis  orders are present.             Disposition Planning:     Barriers to Discharge:cult abcess  Anticipated Date of Discharge: 2/15  Place of Discharge: home      Time: 35 minutes     Code Status and Medical Interventions: CPR (Attempt to Resuscitate); Full Support   Ordered at: 02/10/25 2011     Code Status (Patient has no pulse and is not breathing):    CPR (Attempt to Resuscitate)     Medical Interventions (Patient has pulse or is breathing):    Full Support       Signature: Electronically signed by Branden Becerra MD, 02/15/25, 11:38 EST.  [facility]

## 2025-02-15 NOTE — PROGRESS NOTES
General Surgery Progress Note    Name: Mary Ann Fletcher ADMIT: 2/10/2025   : 1980  PCP: Leticia Gerber APRN    MRN: [id number] LOS: 5 days   AGE/SEX: 45 y.o. female  ROOM:    [facility]    No chief complaint on file.    Subjective     Patient seen and examined with Dr. Kaminski.  Vital signs stable, afebrile.  Reports some discomfort in right groin from surgery.  Denies fevers and chills.  Tolerating regular diet without nausea or vomiting.  White count remains normal    Objective     Scheduled Medications:   buPROPion XL, 150 mg, Oral, Daily  clobetasol, , Topical, Q12H  diphenoxylate-atropine, 1 tablet, Oral, TID  enoxaparin, 40 mg, Subcutaneous, Q12H  pantoprazole, 40 mg, Oral, QAM AC  potassium chloride, 20 mEq, Oral, BID  sodium chloride, 10 mL, Intravenous, Q12H  sulfamethoxazole-trimethoprim, 1 tablet, Oral, Q12H  vilazodone, 20 mg, Oral, Daily        Active Infusions:       As Needed Medications:    acetaminophen    senna-docusate sodium **AND** polyethylene glycol **AND** bisacodyl **AND** bisacodyl    calcium carbonate    heparin    HYDROmorphone **AND** naloxone    ondansetron    oxyCODONE    Potassium Replacement - Follow Nurse / BPA Driven Protocol    propranolol    sodium chloride    sodium chloride    Vital Signs  Vital Signs Patient Vitals for the past 24 hrs:   BP Temp Temp src Pulse Resp SpO2 Weight   02/15/25 1300 -- 97.6 °F (36.4 °C) Oral 94 20 97 % --   02/15/25 0938 117/71 97.1 °F (36.2 °C) Oral 100 21 98 % --   02/15/25 0526 -- -- -- -- -- -- 114 kg (250 lb 10.6 oz)   02/15/25 0429 109/68 97.9 °F (36.6 °C) Oral 99 21 96 % --   25 2347 101/50 97.8 °F (36.6 °C) Oral 81 18 96 % --   25 1940 108/86 97.7 °F (36.5 °C) Oral 112 21 94 % --   25 1653 108/57 97.7 °F (36.5 °C) Oral 99 16 96 % --     I/O:  I/O last 3 completed shifts:  In: 1140 [P.O.:240; I.V.:900]  Out: 5 [Blood:5]    Physical Exam:  Physical Exam  Constitutional:       General: She is not in acute  distress.  Cardiovascular:      Rate and Rhythm: Normal rate.   Pulmonary:      Effort: Pulmonary effort is normal. No respiratory distress.   Abdominal:      General: There is no distension.      Palpations: Abdomen is soft.      Tenderness: There is no abdominal tenderness. There is no guarding.   Genitourinary:     Comments: Incision of right groin with packing in place.  Mild erythema, no active drainage or bleeding.  Neurological:      Mental Status: She is alert. Mental status is at baseline.   Psychiatric:         Mood and Affect: Mood normal.         Behavior: Behavior normal.         Results Review:     CBC    Results from last 7 days   Lab Units 02/15/25  0452 02/14/25  0359 02/13/25  0610 02/11/25  0606 02/10/25  1316   WBC 10*3/mm3 7.87 6.00 6.58 6.80 7.93   HEMOGLOBIN g/dL 8.4* 9.1* 8.4* 9.9* 13.2   PLATELETS 10*3/mm3 126* 187 128* 142 215     BMP   Results from last 7 days   Lab Units 02/15/25  0452 02/14/25  0457 02/13/25  2030 02/13/25  1411 02/13/25  0610 02/12/25  1437 02/12/25  1118 02/11/25  1259 02/11/25  0606 02/10/25  2052   SODIUM mmol/L 139 140  --   --  138  --  134*  --  134* 132*   POTASSIUM mmol/L 3.6 3.3* 2.8* 3.0* 3.2* 3.1* 3.3*   < > 3.0* 3.1*   CHLORIDE mmol/L 104 106  --   --  104  --  102  --  100 92*   CO2 mmol/L 26.2 24.6  --   --  23.2  --  21.5*  --  21.5* 21.1*   BUN mg/dL 5* 4*  --   --  5*  --  8  --  16 26*   CREATININE mg/dL 0.79 0.77  --   --  0.83  --  0.79  --  0.85 1.27*   GLUCOSE mg/dL 123* 107*  --   --  105*  --  120*  --  109* 150*   MAGNESIUM mg/dL  --   --   --   --   --   --  1.8  --  1.9 2.0   PHOSPHORUS mg/dL  --   --   --   --   --   --   --   --  2.5 3.6    < > = values in this interval not displayed.     Radiology(recent) No radiology results for the last day    I reviewed the patient's new clinical results.    Assessment & Plan       Nausea and vomiting      45 y.o. female POD 1 status post incision and drainage of right groin abscess    -Diet as  tolerated  -Antiemetics and pain medication as needed  -Packing removed at bedside.  Educated patient on cleansing area daily and replacing with clean dry dressing twice daily and as needed for saturation.  -Okay to discharge from general surgery perspective once medically cleared.  -Patient was educated to call office if she noticed increased redness, increased pain, increased drainage or fever/chills.        This note was created using Dragon Voice Recognition software.    SUSANNA Vazquez  02/15/25  14:10 EST

## 2025-02-15 NOTE — PLAN OF CARE
Problem: Adult Inpatient Plan of Care  Goal: Absence of Hospital-Acquired Illness or Injury  Intervention: Identify and Manage Fall Risk  Recent Flowsheet Documentation  Taken 2/15/2025 0600 by Marisa Horton RN  Safety Promotion/Fall Prevention:   assistive device/personal items within reach   clutter free environment maintained   fall prevention program maintained   nonskid shoes/slippers when out of bed   room organization consistent   safety round/check completed  Taken 2/15/2025 0400 by Marisa Horton RN  Safety Promotion/Fall Prevention:   assistive device/personal items within reach   clutter free environment maintained   fall prevention program maintained   nonskid shoes/slippers when out of bed   room organization consistent   safety round/check completed  Taken 2/15/2025 0200 by Marisa Horton RN  Safety Promotion/Fall Prevention:   assistive device/personal items within reach   clutter free environment maintained   fall prevention program maintained   nonskid shoes/slippers when out of bed   room organization consistent   safety round/check completed  Taken 2/15/2025 0000 by Dockins, Marisa, RN  Safety Promotion/Fall Prevention:   assistive device/personal items within reach   clutter free environment maintained   fall prevention program maintained   nonskid shoes/slippers when out of bed   room organization consistent   safety round/check completed  Taken 2/14/2025 2200 by Marisa Horton RN  Safety Promotion/Fall Prevention:   assistive device/personal items within reach   clutter free environment maintained   fall prevention program maintained   nonskid shoes/slippers when out of bed   room organization consistent   safety round/check completed  Taken 2/14/2025 2000 by Marisa Horton RN  Safety Promotion/Fall Prevention:   assistive device/personal items within reach   clutter free environment maintained   fall prevention program maintained   nonskid shoes/slippers  when out of bed   room organization consistent   safety round/check completed  Intervention: Prevent Skin Injury  Recent Flowsheet Documentation  Taken 2/15/2025 0400 by Marisa Horton RN  Skin Protection: transparent dressing maintained  Taken 2/15/2025 0000 by Marisa Horton RN  Skin Protection: transparent dressing maintained  Taken 2/14/2025 2000 by Marisa Horton RN  Skin Protection: transparent dressing maintained  Intervention: Prevent and Manage VTE (Venous Thromboembolism) Risk  Recent Flowsheet Documentation  Taken 2/14/2025 2000 by Marisa Horton RN  VTE Prevention/Management:   SCDs (sequential compression devices) off   patient refused intervention  Intervention: Prevent Infection  Recent Flowsheet Documentation  Taken 2/15/2025 0600 by Marisa Horton RN  Infection Prevention:   environmental surveillance performed   hand hygiene promoted   personal protective equipment utilized   rest/sleep promoted   single patient room provided  Taken 2/15/2025 0400 by Marisa Horton RN  Infection Prevention:   environmental surveillance performed   hand hygiene promoted   personal protective equipment utilized   rest/sleep promoted   single patient room provided  Taken 2/15/2025 0200 by Marisa Horton RN  Infection Prevention:   environmental surveillance performed   hand hygiene promoted   personal protective equipment utilized   rest/sleep promoted   single patient room provided  Taken 2/15/2025 0000 by Marisa Horton RN  Infection Prevention:   environmental surveillance performed   hand hygiene promoted   personal protective equipment utilized   rest/sleep promoted   single patient room provided  Taken 2/14/2025 2200 by Marisa Horton RN  Infection Prevention:   environmental surveillance performed   hand hygiene promoted   personal protective equipment utilized   rest/sleep promoted   single patient room provided  Taken 2/14/2025 2000 by Marisa Horton  RN  Infection Prevention:   environmental surveillance performed   hand hygiene promoted   personal protective equipment utilized   rest/sleep promoted   single patient room provided  Goal: Optimal Comfort and Wellbeing  Intervention: Provide Person-Centered Care  Recent Flowsheet Documentation  Taken 2/15/2025 0400 by Marisa Horton RN  Trust Relationship/Rapport: care explained  Taken 2/15/2025 0000 by Marisa Horton RN  Trust Relationship/Rapport: care explained  Taken 2/14/2025 2000 by Marisa Horton RN  Trust Relationship/Rapport:   care explained   choices provided     Problem: Pain Acute  Goal: Optimal Pain Control and Function  Intervention: Optimize Psychosocial Wellbeing  Recent Flowsheet Documentation  Taken 2/14/2025 2000 by Marisa Horton RN  Diversional Activities: smartphone  Intervention: Prevent or Manage Pain  Recent Flowsheet Documentation  Taken 2/15/2025 0600 by Marisa Horton RN  Medication Review/Management: medications reviewed  Taken 2/15/2025 0400 by Marisa Horton RN  Medication Review/Management: medications reviewed  Taken 2/15/2025 0200 by Marisa Horton RN  Medication Review/Management: medications reviewed  Taken 2/15/2025 0000 by Marisa Horton RN  Medication Review/Management: medications reviewed  Taken 2/14/2025 2200 by Marisa Horton RN  Medication Review/Management: medications reviewed  Taken 2/14/2025 2000 by Marisa Horton RN  Medication Review/Management: medications reviewed   Goal Outcome Evaluation:

## 2025-02-15 NOTE — DISCHARGE SUMMARY
Discharge Note   Mary Ann Fletcher 1980 [id number] 5     Date of Admission:2/10/2025     Date of Discharge: 02/15/25     Admission Diagnosis: Nausea and vomiting [R11.2]     Discharge Diagnosis:     Nausea and vomiting       Consults: general surgery    Hospital Course: his is a 55 years old female with past medical history of breast cancer currently on chemotherapy, anxiety, depression, obesity who was sent in from her outpatient oncologist visit with nausea, vomiting and diarrhea. her last chemo was 2/3 and the session was supposed to be in 3 weeks time however patient developed nausea, vomiting and diarrhea and has lost significant amount of weight.  Her oncologist sent her for further management of dehydration.  She has right groin pain started on February 9, 2025 and she goes through an [facility] I&D by general surgery on February 14, 2025.  Patient was put on Bactrim and oral.  Patient is discharged with stable clinical condition follow-up with general surgery in 2 weeks.    Vitals:    02/15/25 0938   BP: 117/71   Pulse: 100   Resp: 21   Temp: 97.1 °F (36.2 °C)   SpO2: 98%        Disposition: home      Discharged Condition: good    Activity: activity as tolerated    Diet:  Diet Order   Procedures    Diet: Regular/House; Fluid Consistency: Thin (IDDSI 0)        Labs:    Results from last 7 days   Lab Units 02/15/25  0452 02/14/25  0359 02/13/25  0610 02/11/25  0606 02/10/25  1316   WBC 10*3/mm3 7.87 6.00 6.58 6.80 7.93   HEMOGLOBIN g/dL 8.4* 9.1* 8.4* 9.9* 13.2   HEMATOCRIT % 26.1* 27.3* 26.3* 29.2* 38.6   PLATELETS 10*3/mm3 126* 187 128* 142 215       Results from last 7 days   Lab Units 02/15/25  0452 02/14/25  0457 02/13/25  2030 02/13/25  1411 02/13/25  0610 02/12/25  1437 02/12/25  1118 02/11/25  1259 02/11/25  0606   SODIUM mmol/L 139 140  --   --  138  --  134*  --  134*   POTASSIUM mmol/L 3.6 3.3* 2.8* 3.0* 3.2*   < > 3.3*   < > 3.0*   CHLORIDE mmol/L 104 106  --   --  104  --  102  --  100   CO2 mmol/L  26.2 24.6  --   --  23.2  --  21.5*  --  21.5*   BUN mg/dL 5* 4*  --   --  5*  --  8  --  16   CREATININE mg/dL 0.79 0.77  --   --  0.83  --  0.79  --  0.85    < > = values in this interval not displayed.                  Discharge Medications        New Medications        Instructions Start Date   sulfamethoxazole-trimethoprim 800-160 MG per tablet  Commonly known as: BACTRIM DS,SEPTRA DS   1 tablet, Oral, Every 12 Hours Scheduled             Continue These Medications        Instructions Start Date   buPROPion  MG 24 hr tablet  Commonly known as: Wellbutrin XL   150 mg, Oral, Daily      clobetasol 0.05 % external solution  Commonly known as: TEMOVATE   Apply  topically to the appropriate area as directed.      dexAMETHasone 4 MG tablet  Commonly known as: DECADRON   Take 2 tablets oral twice a day for 3 consecutive days beginning the day before chemotherapy and continue for 6 doses.      diphenoxylate-atropine 2.5-0.025 MG per tablet  Commonly known as: LOMOTIL   1 tablet, Oral, 3 Times Daily      lidocaine-prilocaine 2.5-2.5 % cream  Commonly known as: EMLA   1 Application, Topical, Take As Directed, Apply to port 1 hour prior to access      OLANZapine 5 MG tablet  Commonly known as: ZyPREXA   5 mg, Oral, Nightly, Take nightly for 4 starting night of chemotherapy.      ondansetron 8 MG tablet  Commonly known as: ZOFRAN   8 mg, Oral, 3 Times Daily PRN      pantoprazole 40 MG EC tablet  Commonly known as: Protonix   40 mg, Oral, Daily      propranolol 10 MG tablet  Commonly known as: INDERAL   10 mg, Oral, 3 Times Daily PRN      vilazodone 20 MG tablet tablet  Commonly known as: VIIBRYD   20 mg, Oral, Daily             Stop These Medications      metroNIDAZOLE 500 MG tablet  Commonly known as: Flagyl               Spent at least 35 minutes in the management of patient's care including but not limited to physical exam, review of vital signs, labs, cultures and imaging studies, discussing the hospital stay  along with plan of care at home, preparation and coordinating of discharge, arranging follow up care and referrals as indicated. Plan also discussed with RN.    Branden Becerra MD  Hospitalist  02/15/25   12:02 EST

## 2025-02-15 NOTE — DISCHARGE INSTRUCTIONS
Complete course of antibiotics  Clean area with soap and water daily  Dry dressing/pad to wound; change twice daily and as needed when saturated  May use ice to the area for comfort  Follow-up in general surgery clinic in 2 weeks

## 2025-02-15 NOTE — CASE MANAGEMENT/SOCIAL WORK
Continued Stay Note  [facility]     Patient Name: Mary Ann Fletcher  MRN: [id number]  Today's Date: 2/15/2025    Admit Date: 2/10/2025    Plan: Plan for home with family.   Discharge Plan       Row Name 02/15/25 1212       Plan    Plan Plan for home with family.    Plan Comments DC orders noted. Pt transitioned to PO antibiotics.             Trice Davis RN BSN  Weekend   [facility]  Phone: [phone]  Fax: [phone]

## 2025-02-16 NOTE — CASE MANAGEMENT/SOCIAL WORK
Case Management Discharge Note      Final Note: Home with family    Transportation Services  Private: Car    Final Discharge Disposition Code: 01 - home or self-care

## 2025-02-16 NOTE — OUTREACH NOTE
Prep Survey      Flowsheet Row Responses   [facility] patient discharged from? [facility]   Is LACE score < 7 ? No   Eligibility [facility]   Date of Admission 02/10/25   Date of Discharge 02/15/25   Discharge Disposition Home or Self Care   Discharge diagnosis Nausea vomiting and diarrhea.   Does the patient have one of the following disease processes/diagnoses(primary or secondary)? Other   Does the patient have Home health ordered? No   Is there a DME ordered? No   Prep survey completed? Yes            NICOLLE CHIN - Registered Nurse

## 2025-02-17 ENCOUNTER — TRANSITIONAL CARE MANAGEMENT TELEPHONE ENCOUNTER (OUTPATIENT)
Dept: CALL CENTER | Facility: HOSPITAL | Age: 45
End: 2025-02-17
Payer: COMMERCIAL

## 2025-02-17 DIAGNOSIS — C50.912 BREAST CANCER, STAGE 2, LEFT: ICD-10-CM

## 2025-02-17 RX ORDER — DIPHENOXYLATE HYDROCHLORIDE AND ATROPINE SULFATE 2.5; .025 MG/1; MG/1
1 TABLET ORAL 3 TIMES DAILY
Qty: 40 TABLET | Refills: 1 | Status: SHIPPED | OUTPATIENT
Start: 2025-02-17

## 2025-02-17 NOTE — PAYOR COMM NOTE
"This is discharge notification for Mary Ann Diana.  Dc'd 2/15/25 routine home.     AUTHORIZATION : [id number]  THANK YOU.      Maria E Luz RN, CCM  Utilization Nurse  [address] [address]   [phone]  Fx [phone]     Mary Ann Diana (45 y.o. Female)       Date of Birth   1980    Social Security Number       Address   [address] [place] [address]    Home Phone   [phone]    MRN   [id number]       Church   None    Marital Status   Single                            Admission Date   2/10/25    Admission Type   Urgent    Admitting Provider   Olga Dunn MD    Attending Provider       Department, Room/Bed   [facility] PROGRESS CARE, 2132/1       Discharge Date   2/15/2025    Discharge Disposition   Home or Self Care    Discharge Destination                                 Attending Provider: (none)   Allergies: Penicillins    Isolation: None   Infection: None   Code Status: Prior    Ht: 160 cm (63\")   Wt: 114 kg (250 lb 10.6 oz)    Admission Cmt: None   Principal Problem: Nausea and vomiting [R11.2]                   Active Insurance as of 2/10/2025       Primary Coverage       Payor Plan Insurance Group Employer/Plan Group    [employer]UE [employer] PPO [id number]       Payor Plan Address Payor Plan Phone Number Payor Plan Fax Number Effective Dates    [address] [phone]  1/1/2023 - None Entered    [place] [address]         Subscriber Name Subscriber Birth Date Member ID       MARY ANN DIANA 1980 [id number]                     Emergency Contacts        (Rel.) Home Phone Work Phone Mobile Phone    jolly diana (Sister) [phone] -- [phone]    Demetris Diana (Mother) -- -- [phone]                 Discharge Summary        Branden Becerra MD at 02/15/25 1159            Discharge Note   Mary Ann Diana 1980 [id number] 5     Date of Admission:2/10/2025     Date of " Discharge: 02/15/25     Admission Diagnosis: Nausea and vomiting [R11.2]     Discharge Diagnosis:     Nausea and vomiting       Consults: general surgery    Hospital Course: his is a 55 years old female with past medical history of breast cancer currently on chemotherapy, anxiety, depression, obesity who was sent in from her outpatient oncologist visit with nausea, vomiting and diarrhea. her last chemo was 2/3 and the session was supposed to be in 3 weeks time however patient developed nausea, vomiting and diarrhea and has lost significant amount of weight.  Her oncologist sent her for further management of dehydration.  She has right groin pain started on February 9, 2025 and she goes through an Rufen I&D by general surgery on February 14, 2025.  Patient was put on Bactrim and oral.  Patient is discharged with stable clinical condition follow-up with general surgery in 2 weeks.    Vitals:    02/15/25 0938   BP: 117/71   Pulse: 100   Resp: 21   Temp: 97.1 °F (36.2 °C)   SpO2: 98%        Disposition: home      Discharged Condition: good    Activity: activity as tolerated    Diet:  Diet Order   Procedures    Diet: Regular/House; Fluid Consistency: Thin (IDDSI 0)        Labs:    Results from last 7 days   Lab Units 02/15/25  0452 02/14/25  0359 02/13/25  0610 02/11/25  0606 02/10/25  1316   WBC 10*3/mm3 7.87 6.00 6.58 6.80 7.93   HEMOGLOBIN g/dL 8.4* 9.1* 8.4* 9.9* 13.2   HEMATOCRIT % 26.1* 27.3* 26.3* 29.2* 38.6   PLATELETS 10*3/mm3 126* 187 128* 142 215       Results from last 7 days   Lab Units 02/15/25  0452 02/14/25  0457 02/13/25  2030 02/13/25  1411 02/13/25  0610 02/12/25  1437 02/12/25  1118 02/11/25  1259 02/11/25  0606   SODIUM mmol/L 139 140  --   --  138  --  134*  --  134*   POTASSIUM mmol/L 3.6 3.3* 2.8* 3.0* 3.2*   < > 3.3*   < > 3.0*   CHLORIDE mmol/L 104 106  --   --  104  --  102  --  100   CO2 mmol/L 26.2 24.6  --   --  23.2  --  21.5*  --  21.5*   BUN mg/dL 5* 4*  --   --  5*  --  8  --  16    CREATININE mg/dL 0.79 0.77  --   --  0.83  --  0.79  --  0.85    < > = values in this interval not displayed.                  Discharge Medications        New Medications        Instructions Start Date   sulfamethoxazole-trimethoprim 800-160 MG per tablet  Commonly known as: BACTRIM DS,SEPTRA DS   1 tablet, Oral, Every 12 Hours Scheduled             Continue These Medications        Instructions Start Date   buPROPion  MG 24 hr tablet  Commonly known as: Wellbutrin XL   150 mg, Oral, Daily      clobetasol 0.05 % external solution  Commonly known as: TEMOVATE   Apply  topically to the appropriate area as directed.      dexAMETHasone 4 MG tablet  Commonly known as: DECADRON   Take 2 tablets oral twice a day for 3 consecutive days beginning the day before chemotherapy and continue for 6 doses.      diphenoxylate-atropine 2.5-0.025 MG per tablet  Commonly known as: LOMOTIL   1 tablet, Oral, 3 Times Daily      lidocaine-prilocaine 2.5-2.5 % cream  Commonly known as: EMLA   1 Application, Topical, Take As Directed, Apply to port 1 hour prior to access      OLANZapine 5 MG tablet  Commonly known as: ZyPREXA   5 mg, Oral, Nightly, Take nightly for 4 starting night of chemotherapy.      ondansetron 8 MG tablet  Commonly known as: ZOFRAN   8 mg, Oral, 3 Times Daily PRN      pantoprazole 40 MG EC tablet  Commonly known as: Protonix   40 mg, Oral, Daily      propranolol 10 MG tablet  Commonly known as: INDERAL   10 mg, Oral, 3 Times Daily PRN      vilazodone 20 MG tablet tablet  Commonly known as: VIIBRYD   20 mg, Oral, Daily             Stop These Medications      metroNIDAZOLE 500 MG tablet  Commonly known as: Flagyl               Spent at least 35 minutes in the management of patient's care including but not limited to physical exam, review of vital signs, labs, cultures and imaging studies, discussing the hospital stay along with plan of care at home, preparation and coordinating of discharge, arranging follow up  care and referrals as indicated. Plan also discussed with RN.    Branden Becerra MD  Hospitalist  02/15/25   12:02 EST         Electronically signed by Branden Becerra MD at 02/15/25 4848

## 2025-02-17 NOTE — OUTREACH NOTE
Call Center TCM Note      Flowsheet Row Responses   [facility] patient discharged from? [facility]   Does the patient have one of the following disease processes/diagnoses(primary or secondary)? Other   TCM attempt successful? Yes   Call start time 1245   Call end time 1248   Discharge diagnosis Nausea vomiting and diarrhea.   Is patient permission given to speak with other caregiver? No   Person spoke with today (if not patient) and relationship Patient   Medication alerts for this patient Bactrim   Meds reviewed with patient/caregiver? Yes   Is the patient having any side effects they believe may be caused by any medication additions or changes? No   Does the patient have all medications ordered at discharge? Yes   Prescription comments No questions or concerns with medications.   Is the patient taking all medications as directed (includes completed medication regime)? Yes   Comments Patient declines scheduling a PCP HOSPITAL f/u appt at time of TCM call. Patient states she will see specialists only at this time, surgery and Oncology.   Does the patient have an appointment with their PCP within 7-14 days of discharge? No   Nursing Interventions Patient declined scheduling/rescheduling appointment at this time, Patient desires to follow up with specialty only   Has home health visited the patient within 72 hours of discharge? N/A   Psychosocial issues? No   Comments Patient states she is doing better. She has f/u with Surgery in 2 weeks and appts scheduled with Oncology.   Did the patient receive a copy of their discharge instructions? Yes   Nursing interventions Reviewed instructions with patient   What is the patient's perception of their health status since discharge? Improving   Is the patient/caregiver able to teach back signs and symptoms related to disease process for when to call PCP? Yes   Is the patient/caregiver able to teach back signs and symptoms related to disease process for when to call 911? Yes    Is the patient/caregiver able to teach back the hierarchy of who to call/visit for symptoms/problems? PCP, Specialist, Home health nurse, Urgent Care, ED, 911 Yes   If the patient is a current smoker, are they able to teach back resources for cessation? Not a smoker   TCM call completed? Yes   Wrap up additional comments Patient declines scheduling a [facility] HOSPITAL f/u appt at time of TCM call. Patient states she will see specialists only at this time, surgery and Oncology.   Call end time 1248   Would this patient benefit from a Referral to [facility] Social Work? No   Is the patient interested in additional calls from an ambulatory ? No            Sera Hester, RN    2/17/2025, 12:48 GEOVANY BANGURA - Registered Nurse

## 2025-02-18 ENCOUNTER — TELEPHONE (OUTPATIENT)
Dept: SURGERY | Facility: CLINIC | Age: 45
End: 2025-02-18
Payer: COMMERCIAL

## 2025-02-18 NOTE — TELEPHONE ENCOUNTER
Call placed to patient to follow up after surgery and discuss follow up appointment scheduled on 2/27/2025 with Zena Blcakwell PA-C . Left message on machine and encouraged to call with any questions or concerns prior to appt.    Okay for HUB to Relay.

## 2025-02-19 ENCOUNTER — OFFICE VISIT (OUTPATIENT)
Dept: ONCOLOGY | Facility: CLINIC | Age: 45
End: 2025-02-19
Payer: COMMERCIAL

## 2025-02-19 ENCOUNTER — HOSPITAL ENCOUNTER (OUTPATIENT)
Dept: ONCOLOGY | Facility: HOSPITAL | Age: 45
Discharge: HOME | End: 2025-02-19
Admitting: INTERNAL MEDICINE
Payer: COMMERCIAL

## 2025-02-19 VITALS
TEMPERATURE: 98.6 F | SYSTOLIC BLOOD PRESSURE: 116 MMHG | DIASTOLIC BLOOD PRESSURE: 65 MMHG | BODY MASS INDEX: 43.94 KG/M2 | HEIGHT: 63 IN | OXYGEN SATURATION: 96 % | WEIGHT: 248 LBS | HEART RATE: 107 BPM | RESPIRATION RATE: 16 BRPM

## 2025-02-19 VITALS
TEMPERATURE: 98.6 F | WEIGHT: 248 LBS | OXYGEN SATURATION: 96 % | RESPIRATION RATE: 16 BRPM | HEIGHT: 63 IN | BODY MASS INDEX: 43.94 KG/M2 | HEART RATE: 107 BPM | DIASTOLIC BLOOD PRESSURE: 65 MMHG | SYSTOLIC BLOOD PRESSURE: 116 MMHG

## 2025-02-19 DIAGNOSIS — C50.912 BREAST CANCER, STAGE 2, LEFT: Primary | ICD-10-CM

## 2025-02-19 DIAGNOSIS — C50.912 BREAST CANCER, STAGE 2, LEFT: ICD-10-CM

## 2025-02-19 DIAGNOSIS — Z17.0 BREAST CANCER, STAGE 1, ESTROGEN RECEPTOR POSITIVE, LEFT: ICD-10-CM

## 2025-02-19 DIAGNOSIS — R42 DIZZINESS: Primary | ICD-10-CM

## 2025-02-19 DIAGNOSIS — C50.912 BREAST CANCER, STAGE 1, ESTROGEN RECEPTOR POSITIVE, LEFT: ICD-10-CM

## 2025-02-19 LAB
BASOPHILS # BLD AUTO: 0.01 10*3/MM3 (ref 0–0.2)
BASOPHILS NFR BLD AUTO: 0.1 % (ref 0–1.5)
DEPRECATED RDW RBC AUTO: 60.4 FL (ref 37–54)
EOSINOPHIL # BLD AUTO: 0 10*3/MM3 (ref 0–0.4)
EOSINOPHIL NFR BLD AUTO: 0 % (ref 0.3–6.2)
ERYTHROCYTE [DISTWIDTH] IN BLOOD BY AUTOMATED COUNT: 16.5 % (ref 12.3–15.4)
HCT VFR BLD AUTO: 28.5 % (ref 34–46.6)
HGB BLD-MCNC: 9.2 G/DL (ref 12–15.9)
LYMPHOCYTES # BLD AUTO: 2.98 10*3/MM3 (ref 0.7–3.1)
LYMPHOCYTES NFR BLD AUTO: 37.9 % (ref 19.6–45.3)
MCH RBC QN AUTO: 34.5 PG (ref 26.6–33)
MCHC RBC AUTO-ENTMCNC: 32.3 G/DL (ref 31.5–35.7)
MCV RBC AUTO: 106.7 FL (ref 79–97)
MONOCYTES # BLD AUTO: 0.41 10*3/MM3 (ref 0.1–0.9)
MONOCYTES NFR BLD AUTO: 5.2 % (ref 5–12)
NEUTROPHILS NFR BLD AUTO: 4.47 10*3/MM3 (ref 1.7–7)
NEUTROPHILS NFR BLD AUTO: 56.8 % (ref 42.7–76)
PLATELET # BLD AUTO: 103 10*3/MM3 (ref 140–450)
PMV BLD AUTO: 11.1 FL (ref 6–12)
RBC # BLD AUTO: 2.67 10*6/MM3 (ref 3.77–5.28)
WBC NRBC COR # BLD AUTO: 7.87 10*3/MM3 (ref 3.4–10.8)

## 2025-02-19 PROCEDURE — 25010000002 HEPARIN LOCK FLUSH PER 10 UNITS: Performed by: INTERNAL MEDICINE

## 2025-02-19 PROCEDURE — 85025 COMPLETE CBC W/AUTO DIFF WBC: CPT | Performed by: INTERNAL MEDICINE

## 2025-02-19 PROCEDURE — 96360 HYDRATION IV INFUSION INIT: CPT

## 2025-02-19 PROCEDURE — 25810000003 SODIUM CHLORIDE 0.9 % SOLUTION: Performed by: INTERNAL MEDICINE

## 2025-02-19 RX ORDER — HEPARIN SODIUM (PORCINE) LOCK FLUSH IV SOLN 100 UNIT/ML 100 UNIT/ML
500 SOLUTION INTRAVENOUS AS NEEDED
Status: CANCELLED | OUTPATIENT
Start: 2025-02-19

## 2025-02-19 RX ORDER — HEPARIN SODIUM (PORCINE) LOCK FLUSH IV SOLN 100 UNIT/ML 100 UNIT/ML
500 SOLUTION INTRAVENOUS AS NEEDED
Status: DISCONTINUED | OUTPATIENT
Start: 2025-02-19 | End: 2025-02-20 | Stop reason: HOSPADM

## 2025-02-19 RX ORDER — SODIUM CHLORIDE 0.9 % (FLUSH) 0.9 %
20 SYRINGE (ML) INJECTION AS NEEDED
Status: DISCONTINUED | OUTPATIENT
Start: 2025-02-19 | End: 2025-02-20 | Stop reason: HOSPADM

## 2025-02-19 RX ORDER — SODIUM CHLORIDE 0.9 % (FLUSH) 0.9 %
20 SYRINGE (ML) INJECTION AS NEEDED
Status: CANCELLED | OUTPATIENT
Start: 2025-02-19

## 2025-02-19 RX ADMIN — Medication 20 ML: at 15:14

## 2025-02-19 RX ADMIN — HEPARIN 500 UNITS: 100 SYRINGE at 15:14

## 2025-02-19 RX ADMIN — SODIUM CHLORIDE 1000 ML: 9 INJECTION, SOLUTION INTRAVENOUS at 13:53

## 2025-02-19 NOTE — PROGRESS NOTES
Pt here for IVF as scheduled prior to visit with Dr Saldivar.  Treatment administer per MAR and pt taken to Dr Saldivar follow up.
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
## 2025-02-21 ENCOUNTER — HOSPITAL ENCOUNTER (OUTPATIENT)
Dept: ONCOLOGY | Facility: HOSPITAL | Age: 45
Discharge: HOME | End: 2025-02-21
Payer: COMMERCIAL

## 2025-02-21 VITALS
BODY MASS INDEX: 44.65 KG/M2 | TEMPERATURE: 97.6 F | SYSTOLIC BLOOD PRESSURE: 124 MMHG | RESPIRATION RATE: 14 BRPM | DIASTOLIC BLOOD PRESSURE: 80 MMHG | HEIGHT: 63 IN | OXYGEN SATURATION: 94 % | HEART RATE: 103 BPM | WEIGHT: 252 LBS

## 2025-02-21 DIAGNOSIS — R42 DIZZINESS: Primary | ICD-10-CM

## 2025-02-21 DIAGNOSIS — Z17.0 BREAST CANCER, STAGE 1, ESTROGEN RECEPTOR POSITIVE, LEFT: ICD-10-CM

## 2025-02-21 DIAGNOSIS — C50.912 BREAST CANCER, STAGE 1, ESTROGEN RECEPTOR POSITIVE, LEFT: ICD-10-CM

## 2025-02-21 PROCEDURE — 96360 HYDRATION IV INFUSION INIT: CPT

## 2025-02-21 PROCEDURE — 25810000003 SODIUM CHLORIDE 0.9 % SOLUTION: Performed by: INTERNAL MEDICINE

## 2025-02-21 PROCEDURE — 25010000002 HEPARIN LOCK FLUSH PER 10 UNITS: Performed by: INTERNAL MEDICINE

## 2025-02-21 RX ORDER — HEPARIN SODIUM (PORCINE) LOCK FLUSH IV SOLN 100 UNIT/ML 100 UNIT/ML
500 SOLUTION INTRAVENOUS AS NEEDED
OUTPATIENT
Start: 2025-02-21

## 2025-02-21 RX ORDER — HEPARIN SODIUM (PORCINE) LOCK FLUSH IV SOLN 100 UNIT/ML 100 UNIT/ML
500 SOLUTION INTRAVENOUS AS NEEDED
Status: DISCONTINUED | OUTPATIENT
Start: 2025-02-21 | End: 2025-02-22 | Stop reason: HOSPADM

## 2025-02-21 RX ORDER — SODIUM CHLORIDE 0.9 % (FLUSH) 0.9 %
20 SYRINGE (ML) INJECTION AS NEEDED
OUTPATIENT
Start: 2025-02-21

## 2025-02-21 RX ORDER — SODIUM CHLORIDE 0.9 % (FLUSH) 0.9 %
20 SYRINGE (ML) INJECTION AS NEEDED
Status: DISCONTINUED | OUTPATIENT
Start: 2025-02-21 | End: 2025-02-22 | Stop reason: HOSPADM

## 2025-02-21 RX ADMIN — Medication 20 ML: at 15:01

## 2025-02-21 RX ADMIN — SODIUM CHLORIDE 1000 ML: 9 INJECTION, SOLUTION INTRAVENOUS at 13:39

## 2025-02-21 RX ADMIN — HEPARIN 500 UNITS: 100 SYRINGE at 15:01

## 2025-02-26 ENCOUNTER — HOSPITAL ENCOUNTER (OUTPATIENT)
Dept: ONCOLOGY | Facility: HOSPITAL | Age: 45
Discharge: HOME | End: 2025-02-26
Admitting: INTERNAL MEDICINE
Payer: COMMERCIAL

## 2025-02-26 ENCOUNTER — READMISSION MANAGEMENT (OUTPATIENT)
Dept: CALL CENTER | Facility: HOSPITAL | Age: 45
End: 2025-02-26
Payer: COMMERCIAL

## 2025-02-26 ENCOUNTER — HOSPITAL ENCOUNTER (OUTPATIENT)
Dept: MRI IMAGING | Facility: HOSPITAL | Age: 45
Discharge: HOME | End: 2025-02-26
Admitting: INTERNAL MEDICINE
Payer: COMMERCIAL

## 2025-02-26 VITALS
TEMPERATURE: 97.7 F | HEART RATE: 111 BPM | RESPIRATION RATE: 16 BRPM | DIASTOLIC BLOOD PRESSURE: 83 MMHG | HEIGHT: 63 IN | WEIGHT: 253 LBS | OXYGEN SATURATION: 96 % | SYSTOLIC BLOOD PRESSURE: 121 MMHG | BODY MASS INDEX: 44.83 KG/M2

## 2025-02-26 DIAGNOSIS — Z17.0 BREAST CANCER, STAGE 1, ESTROGEN RECEPTOR POSITIVE, LEFT: ICD-10-CM

## 2025-02-26 DIAGNOSIS — C50.912 BREAST CANCER, STAGE 1, ESTROGEN RECEPTOR POSITIVE, LEFT: ICD-10-CM

## 2025-02-26 DIAGNOSIS — R42 DIZZINESS: Primary | ICD-10-CM

## 2025-02-26 DIAGNOSIS — C50.912 BREAST CANCER, STAGE 2, LEFT: ICD-10-CM

## 2025-02-26 PROCEDURE — 25010000002 GADOTERIDOL PER 1 ML: Performed by: INTERNAL MEDICINE

## 2025-02-26 PROCEDURE — A9579 GAD-BASE MR CONTRAST NOS,1ML: HCPCS | Performed by: INTERNAL MEDICINE

## 2025-02-26 PROCEDURE — 77049 MRI BREAST C-+ W/CAD BI: CPT

## 2025-02-26 PROCEDURE — 25010000002 HEPARIN LOCK FLUSH PER 10 UNITS: Performed by: INTERNAL MEDICINE

## 2025-02-26 PROCEDURE — 25810000003 SODIUM CHLORIDE 0.9 % SOLUTION: Performed by: INTERNAL MEDICINE

## 2025-02-26 PROCEDURE — 96360 HYDRATION IV INFUSION INIT: CPT

## 2025-02-26 RX ORDER — HEPARIN SODIUM (PORCINE) LOCK FLUSH IV SOLN 100 UNIT/ML 100 UNIT/ML
500 SOLUTION INTRAVENOUS AS NEEDED
Status: DISCONTINUED | OUTPATIENT
Start: 2025-02-26 | End: 2025-02-27 | Stop reason: HOSPADM

## 2025-02-26 RX ORDER — SODIUM CHLORIDE 0.9 % (FLUSH) 0.9 %
20 SYRINGE (ML) INJECTION AS NEEDED
Status: CANCELLED | OUTPATIENT
Start: 2025-02-26

## 2025-02-26 RX ORDER — HEPARIN SODIUM (PORCINE) LOCK FLUSH IV SOLN 100 UNIT/ML 100 UNIT/ML
500 SOLUTION INTRAVENOUS AS NEEDED
Status: CANCELLED | OUTPATIENT
Start: 2025-02-26

## 2025-02-26 RX ORDER — SODIUM CHLORIDE 0.9 % (FLUSH) 0.9 %
20 SYRINGE (ML) INJECTION AS NEEDED
Status: DISCONTINUED | OUTPATIENT
Start: 2025-02-26 | End: 2025-02-27 | Stop reason: HOSPADM

## 2025-02-26 RX ADMIN — GADOTERIDOL 20 ML: 279.3 INJECTION, SOLUTION INTRAVENOUS at 17:42

## 2025-02-26 RX ADMIN — HEPARIN 500 UNITS: 100 SYRINGE at 15:28

## 2025-02-26 RX ADMIN — SODIUM CHLORIDE 1000 ML: 9 INJECTION, SOLUTION INTRAVENOUS at 14:22

## 2025-02-26 RX ADMIN — Medication 20 ML: at 15:28

## 2025-02-26 NOTE — OUTREACH NOTE
Medical Week 2 Survey      Flowsheet Row Responses   [facility] facility patient discharged from? [facility]   Does the patient have one of the following disease processes/diagnoses(primary or secondary)? Other   Week 2 attempt successful? No   Unsuccessful attempts Attempt 1            KATLYN MORRELL - Registered Nurse

## 2025-02-27 ENCOUNTER — OFFICE VISIT (OUTPATIENT)
Dept: SURGERY | Facility: CLINIC | Age: 45
End: 2025-02-27
Payer: COMMERCIAL

## 2025-02-27 VITALS
DIASTOLIC BLOOD PRESSURE: 89 MMHG | HEART RATE: 101 BPM | RESPIRATION RATE: 18 BRPM | SYSTOLIC BLOOD PRESSURE: 122 MMHG | HEIGHT: 63 IN | WEIGHT: 255.9 LBS | BODY MASS INDEX: 45.34 KG/M2 | OXYGEN SATURATION: 95 % | TEMPERATURE: 98.2 F

## 2025-02-27 DIAGNOSIS — L02.214 ABSCESS OF RIGHT GROIN: Primary | ICD-10-CM

## 2025-02-27 PROCEDURE — 99024 POSTOP FOLLOW-UP VISIT: CPT

## 2025-02-27 NOTE — PROGRESS NOTES
"Chief Complaint  Wound Check (Po I & D groin abscess 2/14/2025)    Subjective        Mary Ann Fletcher presents to [facility] GENERAL SURGERY status post incision and drainage of right groin abscess on 2/14/2025.  Overall doing well, reports pain has improved.  States drainage has become scant.  Denies fevers and chills.  Has been tolerating regular diet without nausea or vomiting.  Having regular bowel function.      Objective   Vital Signs:  /89 (BP Location: Right arm, Patient Position: Sitting, Cuff Size: Large Adult)   Pulse 101   Temp 98.2 °F (36.8 °C) (Infrared)   Resp 18   Ht 160 cm (63\")   Wt 116 kg (255 lb 14.4 oz)   SpO2 95%   BMI 45.33 kg/m²   Estimated body mass index is 45.33 kg/m² as calculated from the following:    Height as of this encounter: 160 cm (63\").    Weight as of this encounter: 116 kg (255 lb 14.4 oz).            Physical Exam  Constitutional:       General: She is not in acute distress.  Cardiovascular:      Rate and Rhythm: Normal rate.   Pulmonary:      Effort: Pulmonary effort is normal. No respiratory distress.   Abdominal:      General: There is no distension.      Palpations: Abdomen is soft.      Tenderness: There is no abdominal tenderness. There is no guarding.   Neurological:      Mental Status: She is alert. Mental status is at baseline.   Psychiatric:         Mood and Affect: Mood normal.         Behavior: Behavior normal.        Result Review :                Assessment and Plan   Diagnoses and all orders for this visit:    1. Abscess of right groin (Primary)     status post incision and drainage of right groin abscess on 2/14/2025.  Overall doing well, reports pain has improved.  States drainage has become scant.  Denies fevers and chills.  Has been tolerating regular diet without nausea or vomiting.  Having regular bowel function.  Upon examination, incision appears to still be open and healing.  Incision was explored with a Q-tip and has " decreased significantly.  Induration is minimal and inferior to incision.  Incision still with erythema, but has improved since admission. Recommended continuing local wound care until incision has healed completely.  Will follow-up in 2 weeks for wound check.         Follow Up   No follow-ups on file.  Patient was given instructions and counseling regarding her condition or for health maintenance advice. Please see specific information pulled into the AVS if appropriate.

## 2025-02-28 ENCOUNTER — DOCUMENTATION (OUTPATIENT)
Dept: ONCOLOGY | Facility: CLINIC | Age: 45
End: 2025-02-28
Payer: COMMERCIAL

## 2025-02-28 ENCOUNTER — HOSPITAL ENCOUNTER (OUTPATIENT)
Dept: ONCOLOGY | Facility: HOSPITAL | Age: 45
Discharge: HOME | End: 2025-02-28
Payer: COMMERCIAL

## 2025-02-28 VITALS
SYSTOLIC BLOOD PRESSURE: 129 MMHG | WEIGHT: 254.4 LBS | BODY MASS INDEX: 45.07 KG/M2 | DIASTOLIC BLOOD PRESSURE: 78 MMHG | TEMPERATURE: 97.5 F | HEART RATE: 120 BPM | OXYGEN SATURATION: 96 % | RESPIRATION RATE: 16 BRPM | HEIGHT: 63 IN

## 2025-02-28 DIAGNOSIS — R42 DIZZINESS: Primary | ICD-10-CM

## 2025-02-28 DIAGNOSIS — C50.912 BREAST CANCER, STAGE 1, ESTROGEN RECEPTOR POSITIVE, LEFT: ICD-10-CM

## 2025-02-28 DIAGNOSIS — Z17.0 BREAST CANCER, STAGE 1, ESTROGEN RECEPTOR POSITIVE, LEFT: ICD-10-CM

## 2025-02-28 PROCEDURE — 25010000002 HEPARIN LOCK FLUSH PER 10 UNITS: Performed by: INTERNAL MEDICINE

## 2025-02-28 PROCEDURE — 96360 HYDRATION IV INFUSION INIT: CPT

## 2025-02-28 PROCEDURE — 25810000003 SODIUM CHLORIDE 0.9 % SOLUTION: Performed by: PHYSICIAN ASSISTANT

## 2025-02-28 RX ORDER — SODIUM CHLORIDE 0.9 % (FLUSH) 0.9 %
20 SYRINGE (ML) INJECTION AS NEEDED
Status: DISCONTINUED | OUTPATIENT
Start: 2025-02-28 | End: 2025-03-01 | Stop reason: HOSPADM

## 2025-02-28 RX ORDER — HEPARIN SODIUM (PORCINE) LOCK FLUSH IV SOLN 100 UNIT/ML 100 UNIT/ML
500 SOLUTION INTRAVENOUS AS NEEDED
Status: CANCELLED | OUTPATIENT
Start: 2025-02-28

## 2025-02-28 RX ORDER — HEPARIN SODIUM (PORCINE) LOCK FLUSH IV SOLN 100 UNIT/ML 100 UNIT/ML
500 SOLUTION INTRAVENOUS AS NEEDED
Status: DISCONTINUED | OUTPATIENT
Start: 2025-02-28 | End: 2025-03-01 | Stop reason: HOSPADM

## 2025-02-28 RX ORDER — SODIUM CHLORIDE 0.9 % (FLUSH) 0.9 %
20 SYRINGE (ML) INJECTION AS NEEDED
Status: CANCELLED | OUTPATIENT
Start: 2025-02-28

## 2025-02-28 RX ADMIN — SODIUM CHLORIDE 1000 ML: 9 INJECTION, SOLUTION INTRAVENOUS at 14:39

## 2025-02-28 RX ADMIN — HEPARIN 500 UNITS: 100 SYRINGE at 15:49

## 2025-02-28 RX ADMIN — Medication 20 ML: at 15:49

## 2025-02-28 NOTE — PROGRESS NOTES
Discussed pt with surgeon's office. DALI Machado stated that SUSANNA Freitas has cleared the pt to receive treatment on Monday. Infusion charge nurse and  notified.

## 2025-03-03 ENCOUNTER — HOSPITAL ENCOUNTER (OUTPATIENT)
Dept: ONCOLOGY | Facility: HOSPITAL | Age: 45
Discharge: HOME | End: 2025-03-03
Admitting: NURSE PRACTITIONER
Payer: COMMERCIAL

## 2025-03-03 ENCOUNTER — READMISSION MANAGEMENT (OUTPATIENT)
Dept: CALL CENTER | Facility: HOSPITAL | Age: 45
End: 2025-03-03
Payer: COMMERCIAL

## 2025-03-03 VITALS
HEART RATE: 104 BPM | DIASTOLIC BLOOD PRESSURE: 87 MMHG | OXYGEN SATURATION: 96 % | TEMPERATURE: 98 F | WEIGHT: 252 LBS | BODY MASS INDEX: 44.65 KG/M2 | HEIGHT: 63 IN | RESPIRATION RATE: 16 BRPM | SYSTOLIC BLOOD PRESSURE: 151 MMHG

## 2025-03-03 DIAGNOSIS — C50.912 BREAST CANCER, STAGE 1, ESTROGEN RECEPTOR POSITIVE, LEFT: Primary | ICD-10-CM

## 2025-03-03 DIAGNOSIS — Z17.0 BREAST CANCER, STAGE 1, ESTROGEN RECEPTOR POSITIVE, LEFT: Primary | ICD-10-CM

## 2025-03-03 LAB
ALP BLD-CCNC: 45 U/L (ref 42–141)
BASOPHILS # BLD AUTO: 0.04 10*3/MM3 (ref 0–0.2)
BASOPHILS NFR BLD AUTO: 0.3 % (ref 0–1.5)
BUN BLDA-MCNC: 10 MG/DL (ref 7–22)
CALCIUM BLD QL: 9.5 MG/DL (ref 8–10.3)
CHLORIDE BLDA-SCNC: 106 MMOL/L (ref 98–108)
CO2 BLDA-SCNC: 26 MMOL/L (ref 18–33)
CREAT BLDA-MCNC: 0.9 MG/DL (ref 0.6–1.2)
DEPRECATED RDW RBC AUTO: 73.9 FL (ref 37–54)
EGFRCR SERPLBLD CKD-EPI 2021: 81.5 ML/MIN/1.73
EOSINOPHIL # BLD AUTO: 0.01 10*3/MM3 (ref 0–0.4)
EOSINOPHIL NFR BLD AUTO: 0.1 % (ref 0.3–6.2)
ERYTHROCYTE [DISTWIDTH] IN BLOOD BY AUTOMATED COUNT: 18.2 % (ref 12.3–15.4)
GLUCOSE BLDC GLUCOMTR-MCNC: 141 MG/DL (ref 73–118)
HCT VFR BLD AUTO: 33.9 % (ref 34–46.6)
HGB BLD-MCNC: 10.3 G/DL (ref 12–15.9)
LYMPHOCYTES # BLD AUTO: 2.25 10*3/MM3 (ref 0.7–3.1)
LYMPHOCYTES NFR BLD AUTO: 19.6 % (ref 19.6–45.3)
MCH RBC QN AUTO: 34.4 PG (ref 26.6–33)
MCHC RBC AUTO-ENTMCNC: 30.4 G/DL (ref 31.5–35.7)
MCV RBC AUTO: 113.4 FL (ref 79–97)
MONOCYTES # BLD AUTO: 0.38 10*3/MM3 (ref 0.1–0.9)
MONOCYTES NFR BLD AUTO: 3.3 % (ref 5–12)
NEUTROPHILS NFR BLD AUTO: 76.7 % (ref 42.7–76)
NEUTROPHILS NFR BLD AUTO: 8.82 10*3/MM3 (ref 1.7–7)
PLATELET # BLD AUTO: 407 10*3/MM3 (ref 140–450)
PMV BLD AUTO: 10 FL (ref 6–12)
POC ALBUMIN: 3.6 G/L (ref 3.3–5.5)
POC ALT (SGPT): 24 U/L (ref 10–47)
POC AST (SGOT): 24 U/L (ref 11–38)
POC TOTAL BILIRUBIN: 0.5 MG/DL (ref 0.2–1.6)
POC TOTAL PROTEIN: 7.2 G/DL (ref 6.4–8.1)
POTASSIUM BLDA-SCNC: 4.2 MMOL/L (ref 3.6–5.1)
RBC # BLD AUTO: 2.99 10*6/MM3 (ref 3.77–5.28)
SODIUM BLD-SCNC: 139 MMOL/L (ref 128–145)
WBC NRBC COR # BLD AUTO: 11.5 10*3/MM3 (ref 3.4–10.8)

## 2025-03-03 PROCEDURE — 96417 CHEMO IV INFUS EACH ADDL SEQ: CPT

## 2025-03-03 PROCEDURE — 25010000002 DOCETAXEL 20 MG/ML SOLUTION 8 ML VIAL: Performed by: PHYSICIAN ASSISTANT

## 2025-03-03 PROCEDURE — 25010000002 HEPARIN LOCK FLUSH PER 10 UNITS: Performed by: INTERNAL MEDICINE

## 2025-03-03 PROCEDURE — 25810000003 SODIUM CHLORIDE 0.9 % SOLUTION: Performed by: PHYSICIAN ASSISTANT

## 2025-03-03 PROCEDURE — 85025 COMPLETE CBC W/AUTO DIFF WBC: CPT | Performed by: NURSE PRACTITIONER

## 2025-03-03 PROCEDURE — 96367 TX/PROPH/DG ADDL SEQ IV INF: CPT

## 2025-03-03 PROCEDURE — 96413 CHEMO IV INFUSION 1 HR: CPT

## 2025-03-03 PROCEDURE — 25810000003 SODIUM CHLORIDE 0.9 % SOLUTION 250 ML FLEX CONT: Performed by: PHYSICIAN ASSISTANT

## 2025-03-03 PROCEDURE — 80053 COMPREHEN METABOLIC PANEL: CPT

## 2025-03-03 PROCEDURE — 25010000002 PERTUZUMAB 420 MG/14ML SOLUTION 420 MG VIAL: Performed by: PHYSICIAN ASSISTANT

## 2025-03-03 PROCEDURE — 25010000002 TRASTUZUMAB-ANNS 420 MG RECONSTITUTED SOLUTION 1 EACH VIAL: Performed by: PHYSICIAN ASSISTANT

## 2025-03-03 PROCEDURE — 25010000002 PALONOSETRON PER 25 MCG: Performed by: PHYSICIAN ASSISTANT

## 2025-03-03 PROCEDURE — 25010000002 FOSAPREPITANT PER 1 MG: Performed by: PHYSICIAN ASSISTANT

## 2025-03-03 PROCEDURE — 96375 TX/PRO/DX INJ NEW DRUG ADDON: CPT

## 2025-03-03 PROCEDURE — 25010000002 CARBOPLATIN PER 50 MG: Performed by: PHYSICIAN ASSISTANT

## 2025-03-03 RX ORDER — HEPARIN SODIUM (PORCINE) LOCK FLUSH IV SOLN 100 UNIT/ML 100 UNIT/ML
500 SOLUTION INTRAVENOUS AS NEEDED
Status: CANCELLED | OUTPATIENT
Start: 2025-03-03

## 2025-03-03 RX ORDER — FAMOTIDINE 10 MG/ML
20 INJECTION, SOLUTION INTRAVENOUS AS NEEDED
Status: CANCELLED | OUTPATIENT
Start: 2025-03-03

## 2025-03-03 RX ORDER — SODIUM CHLORIDE 9 MG/ML
20 INJECTION, SOLUTION INTRAVENOUS ONCE
Status: CANCELLED | OUTPATIENT
Start: 2025-03-03

## 2025-03-03 RX ORDER — HEPARIN SODIUM (PORCINE) LOCK FLUSH IV SOLN 100 UNIT/ML 100 UNIT/ML
500 SOLUTION INTRAVENOUS AS NEEDED
Status: DISCONTINUED | OUTPATIENT
Start: 2025-03-03 | End: 2025-03-04 | Stop reason: HOSPADM

## 2025-03-03 RX ORDER — SODIUM CHLORIDE 9 MG/ML
20 INJECTION, SOLUTION INTRAVENOUS ONCE
Status: COMPLETED | OUTPATIENT
Start: 2025-03-03 | End: 2025-03-03

## 2025-03-03 RX ORDER — DIPHENHYDRAMINE HYDROCHLORIDE 50 MG/ML
50 INJECTION INTRAMUSCULAR; INTRAVENOUS AS NEEDED
Status: CANCELLED | OUTPATIENT
Start: 2025-03-03

## 2025-03-03 RX ORDER — HYDROCORTISONE SODIUM SUCCINATE 100 MG/2ML
100 INJECTION INTRAMUSCULAR; INTRAVENOUS AS NEEDED
Status: CANCELLED | OUTPATIENT
Start: 2025-03-03

## 2025-03-03 RX ORDER — SODIUM CHLORIDE 0.9 % (FLUSH) 0.9 %
20 SYRINGE (ML) INJECTION AS NEEDED
Status: CANCELLED | OUTPATIENT
Start: 2025-03-03

## 2025-03-03 RX ORDER — PALONOSETRON 0.05 MG/ML
0.25 INJECTION, SOLUTION INTRAVENOUS ONCE
Status: CANCELLED | OUTPATIENT
Start: 2025-03-03

## 2025-03-03 RX ORDER — SODIUM CHLORIDE 0.9 % (FLUSH) 0.9 %
20 SYRINGE (ML) INJECTION AS NEEDED
Status: DISCONTINUED | OUTPATIENT
Start: 2025-03-03 | End: 2025-03-04 | Stop reason: HOSPADM

## 2025-03-03 RX ORDER — PALONOSETRON 0.05 MG/ML
0.25 INJECTION, SOLUTION INTRAVENOUS ONCE
Status: COMPLETED | OUTPATIENT
Start: 2025-03-03 | End: 2025-03-03

## 2025-03-03 RX ADMIN — Medication 20 ML: at 14:11

## 2025-03-03 RX ADMIN — SODIUM CHLORIDE 20 ML/HR: 9 INJECTION, SOLUTION INTRAVENOUS at 10:03

## 2025-03-03 RX ADMIN — CARBOPLATIN 900 MG: 10 INJECTION, SOLUTION INTRAVENOUS at 13:38

## 2025-03-03 RX ADMIN — PALONOSETRON 0.25 MG: 0.05 INJECTION, SOLUTION INTRAVENOUS at 11:50

## 2025-03-03 RX ADMIN — SODIUM CHLORIDE 155 MG: 9 INJECTION, SOLUTION INTRAVENOUS at 12:32

## 2025-03-03 RX ADMIN — HEPARIN 500 UNITS: 100 SYRINGE at 14:11

## 2025-03-03 RX ADMIN — TRASTUZUMAB-ANNS 680 MG: 420 INJECTION, POWDER, LYOPHILIZED, FOR SOLUTION INTRAVENOUS at 11:14

## 2025-03-03 RX ADMIN — FOSAPREPITANT 100 ML: 150 INJECTION, POWDER, LYOPHILIZED, FOR SOLUTION INTRAVENOUS at 11:53

## 2025-03-03 RX ADMIN — PERTUZUMAB 420 MG: 30 INJECTION, SOLUTION, CONCENTRATE INTRAVENOUS at 10:05

## 2025-03-03 NOTE — PROGRESS NOTES
Pt here for tx.  Port accessed using sterile technique with labs drawn.  Pt tolerated well.  Pt was cleared by surgeon for chemo today due to right groin infection.  Pt feeling well.  Only c/o of some sharp pains to left breast under nipple since getting MRI last week but pains are intermittent and do not last long. Pt did take her PO dexamethasone yesterday and this morning. No other issues to report. Tx given per MAR.  Pt tolerated well.  Pt d/c home.

## 2025-03-03 NOTE — OUTREACH NOTE
Medical Week 2 Survey      Flowsheet Row Responses   [facility], [place] patient discharged from? [facility]   Does the patient have one of the following disease processes/diagnoses(primary or secondary)? Other   Week 2 attempt successful? Yes   Call start time 1526   Discharge diagnosis Nausea vomiting and diarrhea.   Call end time 1528   Meds reviewed with patient/caregiver? Yes   Is the patient having any side effects they believe may be caused by any medication additions or changes? No   Does the patient have all medications ordered at discharge? Yes   Is the patient taking all medications as directed (includes completed medication regime)? Yes   Does the patient have a primary care provider?  Yes   Does the patient have an appointment with their PCP within 7 days of discharge? N/A  [follows with oncologist, will see in April, 2025]   Has the patient kept scheduled appointments due by today? Yes   Has home health visited the patient within 72 hours of discharge? N/A   Psychosocial issues? No   Did the patient receive a copy of their discharge instructions? Yes   Nursing interventions Reviewed instructions with patient   What is the patient's perception of their health status since discharge? Improving   Is the patient/caregiver able to teach back signs and symptoms related to disease process for when to call PCP? Yes   Is the patient/caregiver able to teach back signs and symptoms related to disease process for when to call 911? Yes   Is the patient/caregiver able to teach back the hierarchy of who to call/visit for symptoms/problems? PCP, Specialist, Home health nurse, Urgent Care, ED, 911 Yes   Week 2 Call Completed? Yes   Call end time 1528            Ainsley ENCISO - Registered Nurse

## 2025-03-04 ENCOUNTER — HOSPITAL ENCOUNTER (OUTPATIENT)
Dept: ONCOLOGY | Facility: HOSPITAL | Age: 45
Discharge: HOME | End: 2025-03-04
Admitting: PHYSICIAN ASSISTANT
Payer: COMMERCIAL

## 2025-03-04 DIAGNOSIS — Z17.0 BREAST CANCER, STAGE 1, ESTROGEN RECEPTOR POSITIVE, LEFT: Primary | ICD-10-CM

## 2025-03-04 DIAGNOSIS — C50.912 BREAST CANCER, STAGE 1, ESTROGEN RECEPTOR POSITIVE, LEFT: Primary | ICD-10-CM

## 2025-03-04 PROCEDURE — 96372 THER/PROPH/DIAG INJ SC/IM: CPT

## 2025-03-04 PROCEDURE — 25010000002 PEGFILGRASTIM-JMDB 6 MG/0.6ML SOLUTION PREFILLED SYRINGE: Performed by: PHYSICIAN ASSISTANT

## 2025-03-04 RX ORDER — SODIUM CHLORIDE 0.9 % (FLUSH) 0.9 %
20 SYRINGE (ML) INJECTION AS NEEDED
Status: DISCONTINUED | OUTPATIENT
Start: 2025-03-04 | End: 2025-03-05 | Stop reason: HOSPADM

## 2025-03-04 RX ORDER — HEPARIN SODIUM (PORCINE) LOCK FLUSH IV SOLN 100 UNIT/ML 100 UNIT/ML
500 SOLUTION INTRAVENOUS AS NEEDED
Status: DISCONTINUED | OUTPATIENT
Start: 2025-03-04 | End: 2025-03-05 | Stop reason: HOSPADM

## 2025-03-04 RX ORDER — HEPARIN SODIUM (PORCINE) LOCK FLUSH IV SOLN 100 UNIT/ML 100 UNIT/ML
500 SOLUTION INTRAVENOUS AS NEEDED
OUTPATIENT
Start: 2025-03-04

## 2025-03-04 RX ORDER — SODIUM CHLORIDE 0.9 % (FLUSH) 0.9 %
20 SYRINGE (ML) INJECTION AS NEEDED
OUTPATIENT
Start: 2025-03-04

## 2025-03-04 RX ADMIN — PEGFILGRASTIM-JMDB 6 MG: 6 INJECTION SUBCUTANEOUS at 14:51

## 2025-03-04 NOTE — PROGRESS NOTES
Patient in clinic for Fulphilia injection. Patient declined IVF bolus at this time. Patient tolerated treatment. Discharged, declined AVS.
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
## 2025-03-11 ENCOUNTER — HOSPITAL ENCOUNTER (OUTPATIENT)
Dept: ONCOLOGY | Facility: HOSPITAL | Age: 45
Discharge: HOME | End: 2025-03-11
Admitting: INTERNAL MEDICINE
Payer: COMMERCIAL

## 2025-03-11 VITALS
WEIGHT: 244.2 LBS | HEART RATE: 114 BPM | BODY MASS INDEX: 43.27 KG/M2 | OXYGEN SATURATION: 98 % | RESPIRATION RATE: 16 BRPM | TEMPERATURE: 98.7 F | DIASTOLIC BLOOD PRESSURE: 76 MMHG | HEIGHT: 63 IN | SYSTOLIC BLOOD PRESSURE: 107 MMHG

## 2025-03-11 DIAGNOSIS — R42 DIZZINESS: ICD-10-CM

## 2025-03-11 DIAGNOSIS — R11.2 NAUSEA AND VOMITING, UNSPECIFIED VOMITING TYPE: ICD-10-CM

## 2025-03-11 DIAGNOSIS — C50.912 BREAST CANCER, STAGE 1, ESTROGEN RECEPTOR POSITIVE, LEFT: Primary | ICD-10-CM

## 2025-03-11 DIAGNOSIS — Z17.0 BREAST CANCER, STAGE 1, ESTROGEN RECEPTOR POSITIVE, LEFT: Primary | ICD-10-CM

## 2025-03-11 DIAGNOSIS — R19.7 DIARRHEA, UNSPECIFIED TYPE: Primary | ICD-10-CM

## 2025-03-11 LAB
ALBUMIN SERPL-MCNC: 4.3 G/DL (ref 3.5–5.2)
ALBUMIN/GLOB SERPL: 1.5 G/DL
ALP SERPL-CCNC: 73 U/L (ref 39–117)
ALT SERPL W P-5'-P-CCNC: 31 U/L (ref 1–33)
ANION GAP SERPL CALCULATED.3IONS-SCNC: 12.7 MMOL/L (ref 5–15)
AST SERPL-CCNC: 18 U/L (ref 1–32)
BASOPHILS # BLD AUTO: 0.01 10*3/MM3 (ref 0–0.2)
BASOPHILS NFR BLD AUTO: 0.1 % (ref 0–1.5)
BILIRUB SERPL-MCNC: 0.3 MG/DL (ref 0–1.2)
BUN SERPL-MCNC: 18 MG/DL (ref 6–20)
BUN/CREAT SERPL: 17.3 (ref 7–25)
CALCIUM SPEC-SCNC: 9.4 MG/DL (ref 8.6–10.5)
CHLORIDE SERPL-SCNC: 103 MMOL/L (ref 98–107)
CO2 SERPL-SCNC: 22.3 MMOL/L (ref 22–29)
CREAT SERPL-MCNC: 1.04 MG/DL (ref 0.57–1)
DEPRECATED RDW RBC AUTO: 58.1 FL (ref 37–54)
EGFRCR SERPLBLD CKD-EPI 2021: 68.5 ML/MIN/1.73
EOSINOPHIL # BLD AUTO: 0.01 10*3/MM3 (ref 0–0.4)
EOSINOPHIL NFR BLD AUTO: 0.1 % (ref 0.3–6.2)
ERYTHROCYTE [DISTWIDTH] IN BLOOD BY AUTOMATED COUNT: 15.4 % (ref 12.3–15.4)
GLOBULIN UR ELPH-MCNC: 2.8 GM/DL
GLUCOSE SERPL-MCNC: 99 MG/DL (ref 65–99)
HCT VFR BLD AUTO: 32.9 % (ref 34–46.6)
HGB BLD-MCNC: 10.9 G/DL (ref 12–15.9)
LYMPHOCYTES # BLD AUTO: 3.94 10*3/MM3 (ref 0.7–3.1)
LYMPHOCYTES NFR BLD AUTO: 40 % (ref 19.6–45.3)
MCH RBC QN AUTO: 35.4 PG (ref 26.6–33)
MCHC RBC AUTO-ENTMCNC: 33.1 G/DL (ref 31.5–35.7)
MCV RBC AUTO: 106.8 FL (ref 79–97)
MONOCYTES # BLD AUTO: 1.19 10*3/MM3 (ref 0.1–0.9)
MONOCYTES NFR BLD AUTO: 12.1 % (ref 5–12)
NEUTROPHILS NFR BLD AUTO: 4.7 10*3/MM3 (ref 1.7–7)
NEUTROPHILS NFR BLD AUTO: 47.7 % (ref 42.7–76)
PLATELET # BLD AUTO: 193 10*3/MM3 (ref 140–450)
PMV BLD AUTO: 11.2 FL (ref 6–12)
POTASSIUM SERPL-SCNC: 3.1 MMOL/L (ref 3.5–5.2)
PROT SERPL-MCNC: 7.1 G/DL (ref 6–8.5)
RBC # BLD AUTO: 3.08 10*6/MM3 (ref 3.77–5.28)
SODIUM SERPL-SCNC: 138 MMOL/L (ref 136–145)
WBC NRBC COR # BLD AUTO: 9.85 10*3/MM3 (ref 3.4–10.8)

## 2025-03-11 PROCEDURE — 25010000002 HEPARIN LOCK FLUSH PER 10 UNITS: Performed by: INTERNAL MEDICINE

## 2025-03-11 PROCEDURE — 36415 COLL VENOUS BLD VENIPUNCTURE: CPT

## 2025-03-11 PROCEDURE — 25810000003 SODIUM CHLORIDE 0.9 % SOLUTION: Performed by: INTERNAL MEDICINE

## 2025-03-11 PROCEDURE — 85025 COMPLETE CBC W/AUTO DIFF WBC: CPT | Performed by: INTERNAL MEDICINE

## 2025-03-11 PROCEDURE — 80053 COMPREHEN METABOLIC PANEL: CPT | Performed by: INTERNAL MEDICINE

## 2025-03-11 PROCEDURE — 96360 HYDRATION IV INFUSION INIT: CPT

## 2025-03-11 PROCEDURE — 83735 ASSAY OF MAGNESIUM: CPT | Performed by: INTERNAL MEDICINE

## 2025-03-11 RX ORDER — HEPARIN SODIUM (PORCINE) LOCK FLUSH IV SOLN 100 UNIT/ML 100 UNIT/ML
500 SOLUTION INTRAVENOUS AS NEEDED
Status: DISCONTINUED | OUTPATIENT
Start: 2025-03-11 | End: 2025-03-12 | Stop reason: HOSPADM

## 2025-03-11 RX ORDER — SODIUM CHLORIDE 0.9 % (FLUSH) 0.9 %
20 SYRINGE (ML) INJECTION AS NEEDED
Status: DISCONTINUED | OUTPATIENT
Start: 2025-03-11 | End: 2025-03-12 | Stop reason: HOSPADM

## 2025-03-11 RX ORDER — HEPARIN SODIUM (PORCINE) LOCK FLUSH IV SOLN 100 UNIT/ML 100 UNIT/ML
500 SOLUTION INTRAVENOUS AS NEEDED
Status: CANCELLED | OUTPATIENT
Start: 2025-03-11

## 2025-03-11 RX ORDER — SODIUM CHLORIDE 0.9 % (FLUSH) 0.9 %
20 SYRINGE (ML) INJECTION AS NEEDED
Status: CANCELLED | OUTPATIENT
Start: 2025-03-11

## 2025-03-11 RX ADMIN — Medication 20 ML: at 16:20

## 2025-03-11 RX ADMIN — SODIUM CHLORIDE 1000 ML: 9 INJECTION, SOLUTION INTRAVENOUS at 15:09

## 2025-03-11 RX ADMIN — HEPARIN 500 UNITS: 100 SYRINGE at 16:20

## 2025-03-12 ENCOUNTER — OFFICE VISIT (OUTPATIENT)
Dept: ONCOLOGY | Facility: CLINIC | Age: 45
End: 2025-03-12
Payer: COMMERCIAL

## 2025-03-12 ENCOUNTER — OFFICE VISIT (OUTPATIENT)
Age: 45
End: 2025-03-12
Payer: COMMERCIAL

## 2025-03-12 ENCOUNTER — HOSPITAL ENCOUNTER (OUTPATIENT)
Dept: ONCOLOGY | Facility: HOSPITAL | Age: 45
Discharge: HOME | End: 2025-03-12
Payer: COMMERCIAL

## 2025-03-12 ENCOUNTER — APPOINTMENT (OUTPATIENT)
Dept: LAB | Facility: HOSPITAL | Age: 45
End: 2025-03-12
Payer: COMMERCIAL

## 2025-03-12 VITALS
OXYGEN SATURATION: 97 % | HEART RATE: 111 BPM | TEMPERATURE: 98.3 F | BODY MASS INDEX: 43.23 KG/M2 | WEIGHT: 244 LBS | SYSTOLIC BLOOD PRESSURE: 120 MMHG | DIASTOLIC BLOOD PRESSURE: 95 MMHG | HEIGHT: 63 IN | RESPIRATION RATE: 20 BRPM

## 2025-03-12 VITALS
WEIGHT: 247.2 LBS | HEART RATE: 96 BPM | RESPIRATION RATE: 18 BRPM | SYSTOLIC BLOOD PRESSURE: 136 MMHG | HEIGHT: 63 IN | DIASTOLIC BLOOD PRESSURE: 83 MMHG | BODY MASS INDEX: 43.8 KG/M2 | TEMPERATURE: 97.7 F | OXYGEN SATURATION: 97 %

## 2025-03-12 DIAGNOSIS — C50.912 BREAST CANCER, STAGE 1, ESTROGEN RECEPTOR POSITIVE, LEFT: Primary | ICD-10-CM

## 2025-03-12 DIAGNOSIS — Z17.0 BREAST CANCER, STAGE 1, ESTROGEN RECEPTOR POSITIVE, LEFT: Primary | ICD-10-CM

## 2025-03-12 DIAGNOSIS — C50.912 BREAST CANCER, STAGE 1, ESTROGEN RECEPTOR POSITIVE, LEFT: ICD-10-CM

## 2025-03-12 DIAGNOSIS — Z17.0 BREAST CANCER, STAGE 1, ESTROGEN RECEPTOR POSITIVE, LEFT: ICD-10-CM

## 2025-03-12 DIAGNOSIS — R42 DIZZINESS: Primary | ICD-10-CM

## 2025-03-12 DIAGNOSIS — R11.2 NAUSEA AND VOMITING, UNSPECIFIED VOMITING TYPE: ICD-10-CM

## 2025-03-12 PROCEDURE — 96360 HYDRATION IV INFUSION INIT: CPT

## 2025-03-12 PROCEDURE — 25010000002 HEPARIN LOCK FLUSH PER 10 UNITS: Performed by: INTERNAL MEDICINE

## 2025-03-12 PROCEDURE — 25810000003 SODIUM CHLORIDE 0.9 % SOLUTION: Performed by: INTERNAL MEDICINE

## 2025-03-12 RX ORDER — PANTOPRAZOLE SODIUM 40 MG/1
40 TABLET, DELAYED RELEASE ORAL 2 TIMES DAILY
Qty: 60 TABLET | Refills: 3 | Status: SHIPPED | OUTPATIENT
Start: 2025-03-12

## 2025-03-12 RX ORDER — SODIUM CHLORIDE 0.9 % (FLUSH) 0.9 %
20 SYRINGE (ML) INJECTION AS NEEDED
Status: DISCONTINUED | OUTPATIENT
Start: 2025-03-12 | End: 2025-03-13 | Stop reason: HOSPADM

## 2025-03-12 RX ORDER — GABAPENTIN 100 MG/1
200 CAPSULE ORAL NIGHTLY
Qty: 60 CAPSULE | Refills: 3 | Status: SHIPPED | OUTPATIENT
Start: 2025-03-12

## 2025-03-12 RX ORDER — POTASSIUM CHLORIDE 1500 MG/1
40 TABLET, EXTENDED RELEASE ORAL ONCE
Qty: 2 TABLET | Refills: 0 | Status: SHIPPED | OUTPATIENT
Start: 2025-03-12 | End: 2025-03-12

## 2025-03-12 RX ORDER — HEPARIN SODIUM 5000 [USP'U]/ML
5000 INJECTION, SOLUTION INTRAVENOUS; SUBCUTANEOUS ONCE
OUTPATIENT
Start: 2025-03-12

## 2025-03-12 RX ORDER — SODIUM CHLORIDE 0.9 % (FLUSH) 0.9 %
20 SYRINGE (ML) INJECTION AS NEEDED
Status: CANCELLED | OUTPATIENT
Start: 2025-03-12

## 2025-03-12 RX ORDER — HEPARIN SODIUM (PORCINE) LOCK FLUSH IV SOLN 100 UNIT/ML 100 UNIT/ML
500 SOLUTION INTRAVENOUS AS NEEDED
Status: CANCELLED | OUTPATIENT
Start: 2025-03-12

## 2025-03-12 RX ORDER — HEPARIN SODIUM (PORCINE) LOCK FLUSH IV SOLN 100 UNIT/ML 100 UNIT/ML
500 SOLUTION INTRAVENOUS AS NEEDED
Status: DISCONTINUED | OUTPATIENT
Start: 2025-03-12 | End: 2025-03-13 | Stop reason: HOSPADM

## 2025-03-12 RX ADMIN — Medication 20 ML: at 12:50

## 2025-03-12 RX ADMIN — SODIUM CHLORIDE 1000 ML: 9 INJECTION, SOLUTION INTRAVENOUS at 11:41

## 2025-03-12 RX ADMIN — HEPARIN 500 UNITS: 100 SYRINGE at 12:50
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
## 2025-03-14 ENCOUNTER — HOSPITAL ENCOUNTER (OUTPATIENT)
Dept: ONCOLOGY | Facility: HOSPITAL | Age: 45
Discharge: HOME | End: 2025-03-14
Payer: COMMERCIAL

## 2025-03-14 VITALS
TEMPERATURE: 96.9 F | BODY MASS INDEX: 44.11 KG/M2 | OXYGEN SATURATION: 98 % | DIASTOLIC BLOOD PRESSURE: 83 MMHG | SYSTOLIC BLOOD PRESSURE: 133 MMHG | RESPIRATION RATE: 16 BRPM | WEIGHT: 249 LBS | HEART RATE: 81 BPM

## 2025-03-14 DIAGNOSIS — Z17.0 BREAST CANCER, STAGE 1, ESTROGEN RECEPTOR POSITIVE, LEFT: ICD-10-CM

## 2025-03-14 DIAGNOSIS — C50.912 BREAST CANCER, STAGE 1, ESTROGEN RECEPTOR POSITIVE, LEFT: ICD-10-CM

## 2025-03-14 DIAGNOSIS — R11.2 NAUSEA AND VOMITING, UNSPECIFIED VOMITING TYPE: Primary | ICD-10-CM

## 2025-03-14 DIAGNOSIS — R42 DIZZINESS: ICD-10-CM

## 2025-03-14 LAB — MAGNESIUM SERPL-MCNC: 1.6 MG/DL (ref 1.6–2.6)

## 2025-03-14 PROCEDURE — 25810000003 SODIUM CHLORIDE 0.9 % SOLUTION: Performed by: INTERNAL MEDICINE

## 2025-03-14 PROCEDURE — 25010000002 HEPARIN LOCK FLUSH PER 10 UNITS: Performed by: INTERNAL MEDICINE

## 2025-03-14 PROCEDURE — 96360 HYDRATION IV INFUSION INIT: CPT

## 2025-03-14 RX ORDER — SODIUM CHLORIDE 0.9 % (FLUSH) 0.9 %
20 SYRINGE (ML) INJECTION AS NEEDED
Status: DISCONTINUED | OUTPATIENT
Start: 2025-03-14 | End: 2025-03-15 | Stop reason: HOSPADM

## 2025-03-14 RX ORDER — SODIUM CHLORIDE 0.9 % (FLUSH) 0.9 %
20 SYRINGE (ML) INJECTION AS NEEDED
OUTPATIENT
Start: 2025-03-14

## 2025-03-14 RX ORDER — HEPARIN SODIUM (PORCINE) LOCK FLUSH IV SOLN 100 UNIT/ML 100 UNIT/ML
500 SOLUTION INTRAVENOUS AS NEEDED
OUTPATIENT
Start: 2025-03-14

## 2025-03-14 RX ORDER — HEPARIN SODIUM (PORCINE) LOCK FLUSH IV SOLN 100 UNIT/ML 100 UNIT/ML
500 SOLUTION INTRAVENOUS AS NEEDED
Status: DISCONTINUED | OUTPATIENT
Start: 2025-03-14 | End: 2025-03-15 | Stop reason: HOSPADM

## 2025-03-14 RX ADMIN — SODIUM CHLORIDE 1000 ML: 9 INJECTION, SOLUTION INTRAVENOUS at 14:08

## 2025-03-14 RX ADMIN — HEPARIN 500 UNITS: 100 SYRINGE at 15:13

## 2025-03-14 RX ADMIN — Medication 20 ML: at 15:13

## 2025-03-14 NOTE — PROGRESS NOTES
Pt here for IVF as scheduled, pt denies new complaints,  reports ongoing fatigue.  Treatment administered per MAR.  Pt given AVS

## 2025-03-14 NOTE — ADDENDUM NOTE
Encounter addended by: Micki Gerber on: 3/14/2025 2:16 PM   Actions taken: Child order released for a procedure order, Test resulted

## 2025-03-19 ENCOUNTER — PATIENT ROUNDING (BHMG ONLY) (OUTPATIENT)
Dept: SURGERY | Facility: CLINIC | Age: 45
End: 2025-03-19
Payer: COMMERCIAL

## 2025-03-19 NOTE — PROGRESS NOTES
March 19, 2025    Hello, may I speak with Mary Ann Fletcher?    My name is Mary Ann      I am  with [facility]  [address] [place] [address].    Before we get started may I verify your date of birth? 1980    I am calling to officially welcome you to our practice and ask about your recent visit. Is this a good time to talk? yes    Tell me about your visit with us. What things went well?  Everyone is so fantastic, polite and courteous, no wait ever.        We're always looking for ways to make our patients' experiences even better. Do you have recommendations on ways we may improve?  no    Overall were you satisfied with your first visit to our practice? yes       I appreciate you taking the time to speak with me today. Is there anything else I can do for you? no      Thank you, and have a great day.

## 2025-03-21 ENCOUNTER — HOSPITAL ENCOUNTER (OUTPATIENT)
Dept: ONCOLOGY | Facility: HOSPITAL | Age: 45
Discharge: HOME | End: 2025-03-21
Payer: COMMERCIAL

## 2025-03-21 VITALS
HEART RATE: 97 BPM | OXYGEN SATURATION: 96 % | TEMPERATURE: 97 F | RESPIRATION RATE: 14 BRPM | WEIGHT: 256 LBS | SYSTOLIC BLOOD PRESSURE: 110 MMHG | HEIGHT: 63 IN | BODY MASS INDEX: 45.36 KG/M2 | DIASTOLIC BLOOD PRESSURE: 76 MMHG

## 2025-03-21 DIAGNOSIS — Z17.0 BREAST CANCER, STAGE 1, ESTROGEN RECEPTOR POSITIVE, LEFT: Primary | ICD-10-CM

## 2025-03-21 DIAGNOSIS — C50.912 BREAST CANCER, STAGE 1, ESTROGEN RECEPTOR POSITIVE, LEFT: Primary | ICD-10-CM

## 2025-03-21 DIAGNOSIS — R42 DIZZINESS: ICD-10-CM

## 2025-03-21 PROCEDURE — 96360 HYDRATION IV INFUSION INIT: CPT

## 2025-03-21 PROCEDURE — 25010000002 HEPARIN LOCK FLUSH PER 10 UNITS: Performed by: INTERNAL MEDICINE

## 2025-03-21 PROCEDURE — 25810000003 SODIUM CHLORIDE 0.9 % SOLUTION: Performed by: INTERNAL MEDICINE

## 2025-03-21 RX ORDER — SODIUM CHLORIDE 0.9 % (FLUSH) 0.9 %
20 SYRINGE (ML) INJECTION AS NEEDED
Status: CANCELLED | OUTPATIENT
Start: 2025-03-21

## 2025-03-21 RX ORDER — SODIUM CHLORIDE 0.9 % (FLUSH) 0.9 %
20 SYRINGE (ML) INJECTION AS NEEDED
Status: DISCONTINUED | OUTPATIENT
Start: 2025-03-21 | End: 2025-03-22 | Stop reason: HOSPADM

## 2025-03-21 RX ORDER — HEPARIN SODIUM (PORCINE) LOCK FLUSH IV SOLN 100 UNIT/ML 100 UNIT/ML
500 SOLUTION INTRAVENOUS AS NEEDED
Status: DISCONTINUED | OUTPATIENT
Start: 2025-03-21 | End: 2025-03-22 | Stop reason: HOSPADM

## 2025-03-21 RX ORDER — HEPARIN SODIUM (PORCINE) LOCK FLUSH IV SOLN 100 UNIT/ML 100 UNIT/ML
500 SOLUTION INTRAVENOUS AS NEEDED
Status: CANCELLED | OUTPATIENT
Start: 2025-03-21

## 2025-03-21 RX ADMIN — Medication 10 ML: at 15:59

## 2025-03-21 RX ADMIN — SODIUM CHLORIDE 1000 ML: 9 INJECTION, SOLUTION INTRAVENOUS at 14:48

## 2025-03-21 RX ADMIN — HEPARIN 500 UNITS: 100 SYRINGE at 15:58

## 2025-03-21 NOTE — PROGRESS NOTES
Pt here for IVF, pt denies new complaints. Port accessed and flushed with good blood return noted. No labs collected.  Treatment administered per MAR. Port flushed with saline and heparin prior to needle removal. Pt discharged and given AVS

## 2025-03-24 ENCOUNTER — HOSPITAL ENCOUNTER (OUTPATIENT)
Dept: ONCOLOGY | Facility: HOSPITAL | Age: 45
Discharge: HOME | End: 2025-03-24
Payer: COMMERCIAL

## 2025-03-24 VITALS
RESPIRATION RATE: 14 BRPM | HEART RATE: 105 BPM | WEIGHT: 254 LBS | TEMPERATURE: 97.9 F | BODY MASS INDEX: 45 KG/M2 | OXYGEN SATURATION: 98 % | HEIGHT: 63 IN | DIASTOLIC BLOOD PRESSURE: 89 MMHG | SYSTOLIC BLOOD PRESSURE: 126 MMHG

## 2025-03-24 DIAGNOSIS — C50.912 BREAST CANCER, STAGE 1, ESTROGEN RECEPTOR POSITIVE, LEFT: Primary | ICD-10-CM

## 2025-03-24 DIAGNOSIS — Z17.0 BREAST CANCER, STAGE 1, ESTROGEN RECEPTOR POSITIVE, LEFT: Primary | ICD-10-CM

## 2025-03-24 DIAGNOSIS — C50.912 BREAST CANCER, STAGE 2, LEFT: ICD-10-CM

## 2025-03-24 LAB
ALP BLD-CCNC: 40 U/L (ref 42–141)
BASOPHILS # BLD AUTO: 0.14 10*3/MM3 (ref 0–0.2)
BASOPHILS NFR BLD AUTO: 1.1 % (ref 0–1.5)
BUN BLDA-MCNC: 10 MG/DL (ref 7–22)
CALCIUM BLD QL: 9.7 MG/DL (ref 8–10.3)
CHLORIDE BLDA-SCNC: 103 MMOL/L (ref 98–108)
CO2 BLDA-SCNC: 25 MMOL/L (ref 18–33)
CREAT BLDA-MCNC: 0.7 MG/DL (ref 0.6–1.2)
DEPRECATED RDW RBC AUTO: 67.1 FL (ref 37–54)
EGFRCR SERPLBLD CKD-EPI 2021: 110.2 ML/MIN/1.73
EOSINOPHIL # BLD AUTO: 0 10*3/MM3 (ref 0–0.4)
EOSINOPHIL NFR BLD AUTO: 0 % (ref 0.3–6.2)
ERYTHROCYTE [DISTWIDTH] IN BLOOD BY AUTOMATED COUNT: 16.9 % (ref 12.3–15.4)
GLUCOSE BLDC GLUCOMTR-MCNC: 164 MG/DL (ref 73–118)
HCT VFR BLD AUTO: 33 % (ref 34–46.6)
HGB BLD-MCNC: 10.2 G/DL (ref 12–15.9)
LYMPHOCYTES # BLD AUTO: 2.24 10*3/MM3 (ref 0.7–3.1)
LYMPHOCYTES NFR BLD AUTO: 17.7 % (ref 19.6–45.3)
MCH RBC QN AUTO: 34.9 PG (ref 26.6–33)
MCHC RBC AUTO-ENTMCNC: 30.9 G/DL (ref 31.5–35.7)
MCV RBC AUTO: 113 FL (ref 79–97)
MONOCYTES # BLD AUTO: 0.35 10*3/MM3 (ref 0.1–0.9)
MONOCYTES NFR BLD AUTO: 2.8 % (ref 5–12)
NEUTROPHILS NFR BLD AUTO: 78.4 % (ref 42.7–76)
NEUTROPHILS NFR BLD AUTO: 9.94 10*3/MM3 (ref 1.7–7)
PLATELET # BLD AUTO: 205 10*3/MM3 (ref 140–450)
PMV BLD AUTO: 11 FL (ref 6–12)
POC ALBUMIN: 3.5 G/L (ref 3.3–5.5)
POC ALT (SGPT): 24 U/L (ref 10–47)
POC AST (SGOT): 24 U/L (ref 11–38)
POC TOTAL BILIRUBIN: 0.6 MG/DL (ref 0.2–1.6)
POC TOTAL PROTEIN: 6.7 G/DL (ref 6.4–8.1)
POTASSIUM BLDA-SCNC: 3.7 MMOL/L (ref 3.6–5.1)
RBC # BLD AUTO: 2.92 10*6/MM3 (ref 3.77–5.28)
SODIUM BLD-SCNC: 141 MMOL/L (ref 128–145)
WBC NRBC COR # BLD AUTO: 12.67 10*3/MM3 (ref 3.4–10.8)

## 2025-03-24 PROCEDURE — 25810000003 SODIUM CHLORIDE 0.9 % SOLUTION 250 ML FLEX CONT: Performed by: PHYSICIAN ASSISTANT

## 2025-03-24 PROCEDURE — 25010000002 TRASTUZUMAB-ANNS 420 MG RECONSTITUTED SOLUTION 1 EACH VIAL: Performed by: PHYSICIAN ASSISTANT

## 2025-03-24 PROCEDURE — 96417 CHEMO IV INFUS EACH ADDL SEQ: CPT

## 2025-03-24 PROCEDURE — 85025 COMPLETE CBC W/AUTO DIFF WBC: CPT | Performed by: INTERNAL MEDICINE

## 2025-03-24 PROCEDURE — 25010000002 HEPARIN LOCK FLUSH PER 10 UNITS: Performed by: INTERNAL MEDICINE

## 2025-03-24 PROCEDURE — 96367 TX/PROPH/DG ADDL SEQ IV INF: CPT

## 2025-03-24 PROCEDURE — 96375 TX/PRO/DX INJ NEW DRUG ADDON: CPT

## 2025-03-24 PROCEDURE — 25010000002 CARBOPLATIN PER 50 MG: Performed by: PHYSICIAN ASSISTANT

## 2025-03-24 PROCEDURE — 25010000002 DOCETAXEL 20 MG/ML SOLUTION 8 ML VIAL: Performed by: PHYSICIAN ASSISTANT

## 2025-03-24 PROCEDURE — 25010000002 PALONOSETRON PER 25 MCG: Performed by: PHYSICIAN ASSISTANT

## 2025-03-24 PROCEDURE — 25010000002 PERTUZUMAB 420 MG/14ML SOLUTION 420 MG VIAL: Performed by: PHYSICIAN ASSISTANT

## 2025-03-24 PROCEDURE — 80053 COMPREHEN METABOLIC PANEL: CPT

## 2025-03-24 PROCEDURE — 25010000002 FOSAPREPITANT PER 1 MG: Performed by: PHYSICIAN ASSISTANT

## 2025-03-24 PROCEDURE — 96413 CHEMO IV INFUSION 1 HR: CPT

## 2025-03-24 RX ORDER — PALONOSETRON 0.05 MG/ML
0.25 INJECTION, SOLUTION INTRAVENOUS ONCE
Status: CANCELLED | OUTPATIENT
Start: 2025-03-24

## 2025-03-24 RX ORDER — HEPARIN SODIUM (PORCINE) LOCK FLUSH IV SOLN 100 UNIT/ML 100 UNIT/ML
500 SOLUTION INTRAVENOUS AS NEEDED
Status: CANCELLED | OUTPATIENT
Start: 2025-03-24

## 2025-03-24 RX ORDER — DIPHENOXYLATE HYDROCHLORIDE AND ATROPINE SULFATE 2.5; .025 MG/1; MG/1
1 TABLET ORAL 3 TIMES DAILY
Qty: 40 TABLET | Refills: 1 | Status: SHIPPED | OUTPATIENT
Start: 2025-03-24

## 2025-03-24 RX ORDER — HYDROCORTISONE SODIUM SUCCINATE 100 MG/2ML
100 INJECTION INTRAMUSCULAR; INTRAVENOUS AS NEEDED
Status: CANCELLED | OUTPATIENT
Start: 2025-03-24

## 2025-03-24 RX ORDER — FAMOTIDINE 10 MG/ML
20 INJECTION, SOLUTION INTRAVENOUS AS NEEDED
Status: CANCELLED | OUTPATIENT
Start: 2025-03-24

## 2025-03-24 RX ORDER — PALONOSETRON 0.05 MG/ML
0.25 INJECTION, SOLUTION INTRAVENOUS ONCE
Status: COMPLETED | OUTPATIENT
Start: 2025-03-24 | End: 2025-03-24

## 2025-03-24 RX ORDER — SODIUM CHLORIDE 0.9 % (FLUSH) 0.9 %
20 SYRINGE (ML) INJECTION AS NEEDED
Status: DISCONTINUED | OUTPATIENT
Start: 2025-03-24 | End: 2025-03-25 | Stop reason: HOSPADM

## 2025-03-24 RX ORDER — SODIUM CHLORIDE 9 MG/ML
20 INJECTION, SOLUTION INTRAVENOUS ONCE
Status: CANCELLED | OUTPATIENT
Start: 2025-03-24

## 2025-03-24 RX ORDER — HEPARIN SODIUM (PORCINE) LOCK FLUSH IV SOLN 100 UNIT/ML 100 UNIT/ML
500 SOLUTION INTRAVENOUS AS NEEDED
Status: DISCONTINUED | OUTPATIENT
Start: 2025-03-24 | End: 2025-03-25 | Stop reason: HOSPADM

## 2025-03-24 RX ORDER — SODIUM CHLORIDE 0.9 % (FLUSH) 0.9 %
20 SYRINGE (ML) INJECTION AS NEEDED
Status: CANCELLED | OUTPATIENT
Start: 2025-03-24

## 2025-03-24 RX ORDER — SODIUM CHLORIDE 9 MG/ML
20 INJECTION, SOLUTION INTRAVENOUS ONCE
Status: DISCONTINUED | OUTPATIENT
Start: 2025-03-24 | End: 2025-03-25 | Stop reason: HOSPADM

## 2025-03-24 RX ORDER — DIPHENHYDRAMINE HYDROCHLORIDE 50 MG/ML
50 INJECTION, SOLUTION INTRAMUSCULAR; INTRAVENOUS AS NEEDED
Status: CANCELLED | OUTPATIENT
Start: 2025-03-24

## 2025-03-24 RX ADMIN — CARBOPLATIN 900 MG: 10 INJECTION, SOLUTION INTRAVENOUS at 14:20

## 2025-03-24 RX ADMIN — Medication 20 ML: at 14:53

## 2025-03-24 RX ADMIN — PALONOSETRON 0.25 MG: 0.05 INJECTION, SOLUTION INTRAVENOUS at 12:41

## 2025-03-24 RX ADMIN — PERTUZUMAB 420 MG: 30 INJECTION, SOLUTION, CONCENTRATE INTRAVENOUS at 11:00

## 2025-03-24 RX ADMIN — DOCETAXEL 160 MG: 20 INJECTION, SOLUTION, CONCENTRATE INTRAVENOUS at 13:12

## 2025-03-24 RX ADMIN — TRASTUZUMAB-ANNS 680 MG: 420 INJECTION, POWDER, LYOPHILIZED, FOR SOLUTION INTRAVENOUS at 12:08

## 2025-03-24 RX ADMIN — FOSAPREPITANT 100 ML: 150 INJECTION, POWDER, LYOPHILIZED, FOR SOLUTION INTRAVENOUS at 12:43

## 2025-03-24 RX ADMIN — HEPARIN 500 UNITS: 100 SYRINGE at 14:53

## 2025-03-24 NOTE — PROGRESS NOTES
Pt here for C6 D1 perjeta, kanjinti, docetaxel, carboplatin. Using sterile technique, port accessed for blood collection and treatment. 10 ml blood wasted prior to collecting blood for ordered labs. Pt denies having any new complaints. Pt states she has not had a period since 12/2024 and is currently not sexually active. Treatment given without difficulty and pt tolerated it well. Copy of AVS given at discharge and she verbalized understanding.

## 2025-03-25 ENCOUNTER — HOSPITAL ENCOUNTER (OUTPATIENT)
Dept: ONCOLOGY | Facility: HOSPITAL | Age: 45
Discharge: HOME | End: 2025-03-25
Admitting: PHYSICIAN ASSISTANT
Payer: COMMERCIAL

## 2025-03-25 VITALS
SYSTOLIC BLOOD PRESSURE: 128 MMHG | HEART RATE: 96 BPM | WEIGHT: 254 LBS | DIASTOLIC BLOOD PRESSURE: 74 MMHG | TEMPERATURE: 97.3 F | OXYGEN SATURATION: 96 % | BODY MASS INDEX: 45 KG/M2 | HEIGHT: 63 IN | RESPIRATION RATE: 16 BRPM

## 2025-03-25 DIAGNOSIS — Z17.0 BREAST CANCER, STAGE 1, ESTROGEN RECEPTOR POSITIVE, LEFT: Primary | ICD-10-CM

## 2025-03-25 DIAGNOSIS — R42 DIZZINESS: ICD-10-CM

## 2025-03-25 DIAGNOSIS — C50.912 BREAST CANCER, STAGE 1, ESTROGEN RECEPTOR POSITIVE, LEFT: Primary | ICD-10-CM

## 2025-03-25 PROCEDURE — 25010000002 HEPARIN LOCK FLUSH PER 10 UNITS: Performed by: INTERNAL MEDICINE

## 2025-03-25 PROCEDURE — 96360 HYDRATION IV INFUSION INIT: CPT

## 2025-03-25 PROCEDURE — 25810000003 SODIUM CHLORIDE 0.9 % SOLUTION: Performed by: INTERNAL MEDICINE

## 2025-03-25 PROCEDURE — 96372 THER/PROPH/DIAG INJ SC/IM: CPT

## 2025-03-25 PROCEDURE — 25010000002 PEGFILGRASTIM-JMDB 6 MG/0.6ML SOLUTION PREFILLED SYRINGE: Performed by: PHYSICIAN ASSISTANT

## 2025-03-25 RX ORDER — HEPARIN SODIUM (PORCINE) LOCK FLUSH IV SOLN 100 UNIT/ML 100 UNIT/ML
500 SOLUTION INTRAVENOUS AS NEEDED
Status: DISCONTINUED | OUTPATIENT
Start: 2025-03-25 | End: 2025-03-26 | Stop reason: HOSPADM

## 2025-03-25 RX ORDER — SODIUM CHLORIDE 0.9 % (FLUSH) 0.9 %
20 SYRINGE (ML) INJECTION AS NEEDED
Status: DISCONTINUED | OUTPATIENT
Start: 2025-03-25 | End: 2025-03-26 | Stop reason: HOSPADM

## 2025-03-25 RX ORDER — HEPARIN SODIUM (PORCINE) LOCK FLUSH IV SOLN 100 UNIT/ML 100 UNIT/ML
500 SOLUTION INTRAVENOUS AS NEEDED
OUTPATIENT
Start: 2025-03-25

## 2025-03-25 RX ORDER — SODIUM CHLORIDE 0.9 % (FLUSH) 0.9 %
20 SYRINGE (ML) INJECTION AS NEEDED
OUTPATIENT
Start: 2025-03-25

## 2025-03-25 RX ADMIN — PEGFILGRASTIM-JMDB 6 MG: 6 INJECTION SUBCUTANEOUS at 14:53

## 2025-03-25 RX ADMIN — HEPARIN 500 UNITS: 100 SYRINGE at 15:55

## 2025-03-25 RX ADMIN — SODIUM CHLORIDE 1000 ML: 9 INJECTION, SOLUTION INTRAVENOUS at 14:52

## 2025-03-25 RX ADMIN — Medication 20 ML: at 15:54

## 2025-03-26 PROBLEM — Z85.3 PERSONAL HISTORY OF BREAST CANCER: Status: ACTIVE | Noted: 2025-03-12

## 2025-04-03 ENCOUNTER — OFFICE VISIT (OUTPATIENT)
Dept: ONCOLOGY | Facility: CLINIC | Age: 45
End: 2025-04-03
Payer: COMMERCIAL

## 2025-04-03 ENCOUNTER — LAB (OUTPATIENT)
Dept: LAB | Facility: HOSPITAL | Age: 45
End: 2025-04-03
Payer: COMMERCIAL

## 2025-04-03 VITALS
DIASTOLIC BLOOD PRESSURE: 73 MMHG | HEIGHT: 63 IN | BODY MASS INDEX: 44.3 KG/M2 | SYSTOLIC BLOOD PRESSURE: 101 MMHG | TEMPERATURE: 97.3 F | OXYGEN SATURATION: 95 % | WEIGHT: 250 LBS | RESPIRATION RATE: 18 BRPM | HEART RATE: 91 BPM

## 2025-04-03 DIAGNOSIS — D64.3: ICD-10-CM

## 2025-04-03 DIAGNOSIS — C50.912 BREAST CANCER, STAGE 1, ESTROGEN RECEPTOR POSITIVE, LEFT: ICD-10-CM

## 2025-04-03 DIAGNOSIS — R19.7 DIARRHEA, UNSPECIFIED TYPE: Primary | ICD-10-CM

## 2025-04-03 DIAGNOSIS — Z17.0 BREAST CANCER, STAGE 1, ESTROGEN RECEPTOR POSITIVE, LEFT: ICD-10-CM

## 2025-04-03 DIAGNOSIS — Z76.89 ENCOUNTER TO ESTABLISH CARE: ICD-10-CM

## 2025-04-03 DIAGNOSIS — K76.0 HEPATIC STEATOSIS: ICD-10-CM

## 2025-04-03 DIAGNOSIS — Z17.0 BREAST CANCER, STAGE 1, ESTROGEN RECEPTOR POSITIVE, LEFT: Primary | ICD-10-CM

## 2025-04-03 DIAGNOSIS — C50.912 BREAST CANCER, STAGE 1, ESTROGEN RECEPTOR POSITIVE, LEFT: Primary | ICD-10-CM

## 2025-04-03 LAB
ALBUMIN SERPL-MCNC: 4.3 G/DL (ref 3.5–5.2)
ALBUMIN/GLOB SERPL: 1.9 G/DL
ALP SERPL-CCNC: 71 U/L (ref 39–117)
ALT SERPL W P-5'-P-CCNC: 33 U/L (ref 1–33)
ANION GAP SERPL CALCULATED.3IONS-SCNC: 14.1 MMOL/L (ref 5–15)
AST SERPL-CCNC: 24 U/L (ref 1–32)
BASOPHILS # BLD AUTO: 0.02 10*3/MM3 (ref 0–0.2)
BASOPHILS NFR BLD AUTO: 0.2 % (ref 0–1.5)
BILIRUB SERPL-MCNC: 0.2 MG/DL (ref 0–1.2)
BUN SERPL-MCNC: 7 MG/DL (ref 6–20)
BUN/CREAT SERPL: 6.8 (ref 7–25)
CALCIUM SPEC-SCNC: 9 MG/DL (ref 8.6–10.5)
CHLORIDE SERPL-SCNC: 101 MMOL/L (ref 98–107)
CHOLEST SERPL-MCNC: 160 MG/DL (ref 0–200)
CO2 SERPL-SCNC: 24.9 MMOL/L (ref 22–29)
CREAT SERPL-MCNC: 1.03 MG/DL (ref 0.57–1)
DEPRECATED RDW RBC AUTO: 55.1 FL (ref 37–54)
EGFRCR SERPLBLD CKD-EPI 2021: 69.3 ML/MIN/1.73
EOSINOPHIL # BLD AUTO: 0 10*3/MM3 (ref 0–0.4)
EOSINOPHIL NFR BLD AUTO: 0 % (ref 0.3–6.2)
ERYTHROCYTE [DISTWIDTH] IN BLOOD BY AUTOMATED COUNT: 14.5 % (ref 12.3–15.4)
FERRITIN SERPL-MCNC: 580 NG/ML (ref 13–150)
GLOBULIN UR ELPH-MCNC: 2.3 GM/DL
GLUCOSE SERPL-MCNC: 104 MG/DL (ref 65–99)
HBA1C MFR BLD: 5.37 % (ref 4.8–5.6)
HCT VFR BLD AUTO: 29.9 % (ref 34–46.6)
HDLC SERPL-MCNC: 29 MG/DL (ref 40–60)
HGB BLD-MCNC: 9.8 G/DL (ref 12–15.9)
HOLD SPECIMEN: NORMAL
HOLD SPECIMEN: NORMAL
IRON 24H UR-MRATE: 96 MCG/DL (ref 37–145)
IRON SATN MFR SERPL: 29 % (ref 20–50)
LDLC SERPL CALC-MCNC: 77 MG/DL (ref 0–100)
LDLC/HDLC SERPL: 2.23 {RATIO}
LYMPHOCYTES # BLD AUTO: 3.58 10*3/MM3 (ref 0.7–3.1)
LYMPHOCYTES NFR BLD AUTO: 37.2 % (ref 19.6–45.3)
MCH RBC QN AUTO: 36 PG (ref 26.6–33)
MCHC RBC AUTO-ENTMCNC: 32.8 G/DL (ref 31.5–35.7)
MCV RBC AUTO: 109.9 FL (ref 79–97)
MONOCYTES # BLD AUTO: 0.83 10*3/MM3 (ref 0.1–0.9)
MONOCYTES NFR BLD AUTO: 8.6 % (ref 5–12)
NEUTROPHILS NFR BLD AUTO: 5.2 10*3/MM3 (ref 1.7–7)
NEUTROPHILS NFR BLD AUTO: 54 % (ref 42.7–76)
PLATELET # BLD AUTO: 160 10*3/MM3 (ref 140–450)
PMV BLD AUTO: 10.9 FL (ref 6–12)
POTASSIUM SERPL-SCNC: 2.8 MMOL/L (ref 3.5–5.2)
PROT SERPL-MCNC: 6.6 G/DL (ref 6–8.5)
RBC # BLD AUTO: 2.72 10*6/MM3 (ref 3.77–5.28)
SODIUM SERPL-SCNC: 140 MMOL/L (ref 136–145)
TIBC SERPL-MCNC: 326 MCG/DL (ref 298–536)
TRANSFERRIN SERPL-MCNC: 219 MG/DL (ref 200–360)
TRIGL SERPL-MCNC: 331 MG/DL (ref 0–150)
TSH SERPL DL<=0.05 MIU/L-ACNC: 0.77 UIU/ML (ref 0.27–4.2)
VLDLC SERPL-MCNC: 54 MG/DL (ref 5–40)
WBC NRBC COR # BLD AUTO: 9.63 10*3/MM3 (ref 3.4–10.8)

## 2025-04-03 PROCEDURE — 82728 ASSAY OF FERRITIN: CPT | Performed by: INTERNAL MEDICINE

## 2025-04-03 PROCEDURE — 84466 ASSAY OF TRANSFERRIN: CPT | Performed by: INTERNAL MEDICINE

## 2025-04-03 PROCEDURE — 82746 ASSAY OF FOLIC ACID SERUM: CPT | Performed by: INTERNAL MEDICINE

## 2025-04-03 PROCEDURE — 82607 VITAMIN B-12: CPT | Performed by: INTERNAL MEDICINE

## 2025-04-03 PROCEDURE — 83036 HEMOGLOBIN GLYCOSYLATED A1C: CPT | Performed by: NURSE PRACTITIONER

## 2025-04-03 PROCEDURE — 36415 COLL VENOUS BLD VENIPUNCTURE: CPT

## 2025-04-03 PROCEDURE — 80061 LIPID PANEL: CPT | Performed by: NURSE PRACTITIONER

## 2025-04-03 PROCEDURE — 83540 ASSAY OF IRON: CPT | Performed by: INTERNAL MEDICINE

## 2025-04-03 PROCEDURE — 80050 GENERAL HEALTH PANEL: CPT
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
## 2025-04-04 ENCOUNTER — HOSPITAL ENCOUNTER (OUTPATIENT)
Dept: ONCOLOGY | Facility: HOSPITAL | Age: 45
Discharge: HOME | End: 2025-04-04
Payer: COMMERCIAL

## 2025-04-04 ENCOUNTER — RESULTS FOLLOW-UP (OUTPATIENT)
Dept: FAMILY MEDICINE CLINIC | Facility: CLINIC | Age: 45
End: 2025-04-04
Payer: COMMERCIAL

## 2025-04-04 VITALS
OXYGEN SATURATION: 93 % | SYSTOLIC BLOOD PRESSURE: 148 MMHG | WEIGHT: 252.5 LBS | DIASTOLIC BLOOD PRESSURE: 85 MMHG | RESPIRATION RATE: 14 BRPM | BODY MASS INDEX: 44.74 KG/M2 | TEMPERATURE: 97.5 F | HEIGHT: 63 IN | HEART RATE: 123 BPM

## 2025-04-04 DIAGNOSIS — E87.6 HYPOKALEMIA: Primary | ICD-10-CM

## 2025-04-04 DIAGNOSIS — C50.912 BREAST CANCER, STAGE 1, ESTROGEN RECEPTOR POSITIVE, LEFT: ICD-10-CM

## 2025-04-04 DIAGNOSIS — R42 DIZZINESS: ICD-10-CM

## 2025-04-04 DIAGNOSIS — Z17.0 BREAST CANCER, STAGE 1, ESTROGEN RECEPTOR POSITIVE, LEFT: ICD-10-CM

## 2025-04-04 LAB
FOLATE SERPL-MCNC: 11.2 NG/ML (ref 4.78–24.2)
VIT B12 BLD-MCNC: >2000 PG/ML (ref 211–946)

## 2025-04-04 PROCEDURE — 25010000002 HEPARIN LOCK FLUSH PER 10 UNITS: Performed by: INTERNAL MEDICINE

## 2025-04-04 PROCEDURE — 25010000002 POTASSIUM CHLORIDE 10 MEQ/100ML SOLUTION: Performed by: NURSE PRACTITIONER

## 2025-04-04 PROCEDURE — 96361 HYDRATE IV INFUSION ADD-ON: CPT

## 2025-04-04 PROCEDURE — 96366 THER/PROPH/DIAG IV INF ADDON: CPT

## 2025-04-04 PROCEDURE — 96365 THER/PROPH/DIAG IV INF INIT: CPT

## 2025-04-04 PROCEDURE — 25810000003 SODIUM CHLORIDE 0.9 % SOLUTION: Performed by: INTERNAL MEDICINE

## 2025-04-04 RX ORDER — SODIUM CHLORIDE 0.9 % (FLUSH) 0.9 %
20 SYRINGE (ML) INJECTION AS NEEDED
OUTPATIENT
Start: 2025-04-04

## 2025-04-04 RX ORDER — SODIUM CHLORIDE 9 MG/ML
20 INJECTION, SOLUTION INTRAVENOUS ONCE
Status: CANCELLED | OUTPATIENT
Start: 2025-04-04

## 2025-04-04 RX ORDER — HEPARIN SODIUM (PORCINE) LOCK FLUSH IV SOLN 100 UNIT/ML 100 UNIT/ML
500 SOLUTION INTRAVENOUS AS NEEDED
OUTPATIENT
Start: 2025-04-04

## 2025-04-04 RX ORDER — HEPARIN SODIUM (PORCINE) LOCK FLUSH IV SOLN 100 UNIT/ML 100 UNIT/ML
500 SOLUTION INTRAVENOUS AS NEEDED
Status: DISCONTINUED | OUTPATIENT
Start: 2025-04-04 | End: 2025-04-05 | Stop reason: HOSPADM

## 2025-04-04 RX ORDER — POTASSIUM CHLORIDE 7.45 MG/ML
10 INJECTION INTRAVENOUS ONCE
Status: CANCELLED | OUTPATIENT
Start: 2025-04-04

## 2025-04-04 RX ORDER — POTASSIUM CHLORIDE 7.45 MG/ML
10 INJECTION INTRAVENOUS ONCE
Status: COMPLETED | OUTPATIENT
Start: 2025-04-04 | End: 2025-04-04

## 2025-04-04 RX ORDER — SODIUM CHLORIDE 0.9 % (FLUSH) 0.9 %
20 SYRINGE (ML) INJECTION AS NEEDED
Status: DISCONTINUED | OUTPATIENT
Start: 2025-04-04 | End: 2025-04-05 | Stop reason: HOSPADM

## 2025-04-04 RX ADMIN — Medication 20 ML: at 15:15

## 2025-04-04 RX ADMIN — POTASSIUM CHLORIDE 10 MEQ: 10 INJECTION, SOLUTION INTRAVENOUS at 12:56

## 2025-04-04 RX ADMIN — POTASSIUM CHLORIDE 10 MEQ: 10 INJECTION, SOLUTION INTRAVENOUS at 12:01

## 2025-04-04 RX ADMIN — HEPARIN 500 UNITS: 100 SYRINGE at 15:15

## 2025-04-04 RX ADMIN — POTASSIUM CHLORIDE 10 MEQ: 10 INJECTION, SOLUTION INTRAVENOUS at 14:03

## 2025-04-04 RX ADMIN — SODIUM CHLORIDE 1000 ML: 9 INJECTION, SOLUTION INTRAVENOUS at 12:01

## 2025-04-04 NOTE — PROGRESS NOTES
Received message from NANCY Diaz advising that the pt come in for IV potassium replacement for potassium level of 2.8. She also advised that the pt have a CMP rechecked tomorrow. Called pt and discussed this with her. Pt was agreeable, appt scheduled. No questions or needs at this time.

## 2025-04-05 ENCOUNTER — LAB (OUTPATIENT)
Dept: LAB | Facility: HOSPITAL | Age: 45
End: 2025-04-05
Payer: COMMERCIAL

## 2025-04-05 DIAGNOSIS — E87.6 HYPOKALEMIA: ICD-10-CM

## 2025-04-05 LAB
ALBUMIN SERPL-MCNC: 3.9 G/DL (ref 3.5–5.2)
ALBUMIN/GLOB SERPL: 1.6 G/DL
ALP SERPL-CCNC: 73 U/L (ref 39–117)
ALT SERPL W P-5'-P-CCNC: 22 U/L (ref 1–33)
ANION GAP SERPL CALCULATED.3IONS-SCNC: 9.9 MMOL/L (ref 5–15)
AST SERPL-CCNC: 19 U/L (ref 1–32)
BILIRUB SERPL-MCNC: 0.3 MG/DL (ref 0–1.2)
BUN SERPL-MCNC: 4 MG/DL (ref 6–20)
BUN/CREAT SERPL: 3.4 (ref 7–25)
CALCIUM SPEC-SCNC: 8.4 MG/DL (ref 8.6–10.5)
CHLORIDE SERPL-SCNC: 103 MMOL/L (ref 98–107)
CO2 SERPL-SCNC: 26.1 MMOL/L (ref 22–29)
CREAT SERPL-MCNC: 1.16 MG/DL (ref 0.57–1)
EGFRCR SERPLBLD CKD-EPI 2021: 60.1 ML/MIN/1.73
GLOBULIN UR ELPH-MCNC: 2.4 GM/DL
GLUCOSE SERPL-MCNC: 110 MG/DL (ref 65–99)
POTASSIUM SERPL-SCNC: 2.9 MMOL/L (ref 3.5–5.2)
PROT SERPL-MCNC: 6.3 G/DL (ref 6–8.5)
SODIUM SERPL-SCNC: 139 MMOL/L (ref 136–145)

## 2025-04-05 PROCEDURE — 80053 COMPREHEN METABOLIC PANEL: CPT

## 2025-04-05 PROCEDURE — 36415 COLL VENOUS BLD VENIPUNCTURE: CPT

## 2025-04-07 ENCOUNTER — LAB (OUTPATIENT)
Dept: LAB | Facility: HOSPITAL | Age: 45
End: 2025-04-07
Payer: COMMERCIAL

## 2025-04-07 DIAGNOSIS — E87.6 HYPOKALEMIA: ICD-10-CM

## 2025-04-07 LAB
ALBUMIN SERPL-MCNC: 4.2 G/DL (ref 3.5–5.2)
ALBUMIN/GLOB SERPL: 1.8 G/DL
ALP SERPL-CCNC: 63 U/L (ref 39–117)
ALT SERPL W P-5'-P-CCNC: 26 U/L (ref 1–33)
ANION GAP SERPL CALCULATED.3IONS-SCNC: 13.1 MMOL/L (ref 5–15)
AST SERPL-CCNC: 19 U/L (ref 1–32)
BILIRUB SERPL-MCNC: 0.2 MG/DL (ref 0–1.2)
BUN SERPL-MCNC: 6 MG/DL (ref 6–20)
BUN/CREAT SERPL: 5.4 (ref 7–25)
CALCIUM SPEC-SCNC: 9 MG/DL (ref 8.6–10.5)
CHLORIDE SERPL-SCNC: 103 MMOL/L (ref 98–107)
CO2 SERPL-SCNC: 25.9 MMOL/L (ref 22–29)
CREAT SERPL-MCNC: 1.11 MG/DL (ref 0.57–1)
EGFRCR SERPLBLD CKD-EPI 2021: 63.4 ML/MIN/1.73
GLOBULIN UR ELPH-MCNC: 2.3 GM/DL
GLUCOSE SERPL-MCNC: 96 MG/DL (ref 65–99)
METHYLMALONATE SERPL-SCNC: 188 NMOL/L (ref 0–378)
POTASSIUM SERPL-SCNC: 2.8 MMOL/L (ref 3.5–5.2)
PROT SERPL-MCNC: 6.5 G/DL (ref 6–8.5)
SODIUM SERPL-SCNC: 142 MMOL/L (ref 136–145)

## 2025-04-07 PROCEDURE — 36415 COLL VENOUS BLD VENIPUNCTURE: CPT

## 2025-04-07 PROCEDURE — 80053 COMPREHEN METABOLIC PANEL: CPT

## 2025-04-08 ENCOUNTER — TELEPHONE (OUTPATIENT)
Dept: ONCOLOGY | Facility: CLINIC | Age: 45
End: 2025-04-08
Payer: COMMERCIAL

## 2025-04-08 DIAGNOSIS — E87.6 HYPOKALEMIA: Primary | ICD-10-CM

## 2025-04-08 RX ORDER — POTASSIUM CHLORIDE 7.45 MG/ML
10 INJECTION INTRAVENOUS
Status: SHIPPED | OUTPATIENT
Start: 2025-04-08 | End: 2025-04-08

## 2025-04-08 RX ORDER — POTASSIUM CHLORIDE 1500 MG/1
20 TABLET, EXTENDED RELEASE ORAL DAILY
Qty: 30 TABLET | Refills: 1 | Status: SHIPPED | OUTPATIENT
Start: 2025-04-08

## 2025-04-08 NOTE — TELEPHONE ENCOUNTER
Left voicemail for patient that she would need to arrive at 1 pm 04/08 for infusion since it is for 3 hrs and to call back if that does not work

## 2025-04-10 ENCOUNTER — HOSPITAL ENCOUNTER (OUTPATIENT)
Dept: ONCOLOGY | Facility: HOSPITAL | Age: 45
Discharge: HOME | End: 2025-04-10
Payer: COMMERCIAL

## 2025-04-10 ENCOUNTER — HOSPITAL ENCOUNTER (OUTPATIENT)
Dept: CARDIOLOGY | Facility: HOSPITAL | Age: 45
Discharge: HOME | End: 2025-04-10
Admitting: INTERNAL MEDICINE
Payer: COMMERCIAL

## 2025-04-10 VITALS
RESPIRATION RATE: 14 BRPM | OXYGEN SATURATION: 93 % | HEART RATE: 84 BPM | TEMPERATURE: 97.8 F | SYSTOLIC BLOOD PRESSURE: 120 MMHG | BODY MASS INDEX: 44.74 KG/M2 | DIASTOLIC BLOOD PRESSURE: 75 MMHG | HEIGHT: 63 IN | WEIGHT: 252.5 LBS

## 2025-04-10 VITALS — HEART RATE: 108 BPM | SYSTOLIC BLOOD PRESSURE: 120 MMHG | DIASTOLIC BLOOD PRESSURE: 75 MMHG

## 2025-04-10 DIAGNOSIS — R42 DIZZINESS: ICD-10-CM

## 2025-04-10 DIAGNOSIS — D64.3: ICD-10-CM

## 2025-04-10 DIAGNOSIS — Z17.0 BREAST CANCER, STAGE 1, ESTROGEN RECEPTOR POSITIVE, LEFT: ICD-10-CM

## 2025-04-10 DIAGNOSIS — C50.912 BREAST CANCER, STAGE 1, ESTROGEN RECEPTOR POSITIVE, LEFT: ICD-10-CM

## 2025-04-10 DIAGNOSIS — E87.6 HYPOKALEMIA: Primary | ICD-10-CM

## 2025-04-10 LAB
AORTIC DIMENSIONLESS INDEX: 0.96 (DI)
AV MEAN PRESS GRAD SYS DOP V1V2: 4.7 MMHG
AV VMAX SYS DOP: 152.4 CM/SEC
BH CV ECHO LEFT VENTRICLE GLOBAL LONGITUDINAL STRAIN: -17.9 %
BH CV ECHO MEAS - ACS: 2.33 CM
BH CV ECHO MEAS - AO MAX PG: 9.3 MMHG
BH CV ECHO MEAS - AO ROOT DIAM: 3.2 CM
BH CV ECHO MEAS - AO V2 VTI: 25 CM
BH CV ECHO MEAS - AVA(I,D): 2.9 CM2
BH CV ECHO MEAS - EDV(CUBED): 72.6 ML
BH CV ECHO MEAS - EDV(MOD-SP2): 84.8 ML
BH CV ECHO MEAS - EDV(MOD-SP4): 86.8 ML
BH CV ECHO MEAS - EF(MOD-SP2): 60.1 %
BH CV ECHO MEAS - EF(MOD-SP4): 61.6 %
BH CV ECHO MEAS - ESV(CUBED): 23.4 ML
BH CV ECHO MEAS - ESV(MOD-SP2): 33.9 ML
BH CV ECHO MEAS - ESV(MOD-SP4): 33.3 ML
BH CV ECHO MEAS - FS: 31.4 %
BH CV ECHO MEAS - IVS/LVPW: 0.91 CM
BH CV ECHO MEAS - IVSD: 1.03 CM
BH CV ECHO MEAS - LA DIMENSION: 4 CM
BH CV ECHO MEAS - LAT PEAK E' VEL: 10 CM/SEC
BH CV ECHO MEAS - LV MASS(C)D: 150.7 GRAMS
BH CV ECHO MEAS - LV MAX PG: 6.9 MMHG
BH CV ECHO MEAS - LV MEAN PG: 4 MMHG
BH CV ECHO MEAS - LV V1 MAX: 131 CM/SEC
BH CV ECHO MEAS - LV V1 VTI: 24 CM
BH CV ECHO MEAS - LVIDD: 4.2 CM
BH CV ECHO MEAS - LVIDS: 2.9 CM
BH CV ECHO MEAS - LVOT AREA: 3 CM2
BH CV ECHO MEAS - LVOT DIAM: 1.97 CM
BH CV ECHO MEAS - LVPWD: 1.13 CM
BH CV ECHO MEAS - MED PEAK E' VEL: 8.6 CM/SEC
BH CV ECHO MEAS - MR MAX PG: 101.6 MMHG
BH CV ECHO MEAS - MR MAX VEL: 504.1 CM/SEC
BH CV ECHO MEAS - MV A MAX VEL: 78.5 CM/SEC
BH CV ECHO MEAS - MV DEC SLOPE: 633.5 CM/SEC2
BH CV ECHO MEAS - MV DEC TIME: 0.13 SEC
BH CV ECHO MEAS - MV E MAX VEL: 79.7 CM/SEC
BH CV ECHO MEAS - MV E/A: 1.02
BH CV ECHO MEAS - MV MAX PG: 3.9 MMHG
BH CV ECHO MEAS - MV MEAN PG: 1.75 MMHG
BH CV ECHO MEAS - MV V2 VTI: 18.8 CM
BH CV ECHO MEAS - MVA(VTI): 3.9 CM2
BH CV ECHO MEAS - PA ACC TIME: 0.13 SEC
BH CV ECHO MEAS - PA V2 MAX: 100.2 CM/SEC
BH CV ECHO MEAS - PI END-D VEL: 105.4 CM/SEC
BH CV ECHO MEAS - PULM A REVS VEL: 33.6 CM/SEC
BH CV ECHO MEAS - PULM DIAS VEL: 43.9 CM/SEC
BH CV ECHO MEAS - PULM S/D: 1.44
BH CV ECHO MEAS - PULM SYS VEL: 63.2 CM/SEC
BH CV ECHO MEAS - QP/QS: 0.85
BH CV ECHO MEAS - RV MAX PG: 2.28 MMHG
BH CV ECHO MEAS - RV V1 MAX: 75.6 CM/SEC
BH CV ECHO MEAS - RV V1 VTI: 13 CM
BH CV ECHO MEAS - RVDD: 2.9 CM
BH CV ECHO MEAS - RVOT DIAM: 2.46 CM
BH CV ECHO MEAS - SV(LVOT): 72.8 ML
BH CV ECHO MEAS - SV(MOD-SP2): 51 ML
BH CV ECHO MEAS - SV(MOD-SP4): 53.5 ML
BH CV ECHO MEAS - SV(RVOT): 61.8 ML
BH CV ECHO MEAS - TAPSE (>1.6): 2.12 CM
BH CV ECHO MEAS - TR MAX PG: 34.8 MMHG
BH CV ECHO MEAS - TR MAX VEL: 294.8 CM/SEC
BH CV ECHO MEASUREMENTS AVERAGE E/E' RATIO: 8.57
LV EF BIPLANE MOD: 60 %

## 2025-04-10 PROCEDURE — 96366 THER/PROPH/DIAG IV INF ADDON: CPT

## 2025-04-10 PROCEDURE — 93356 MYOCRD STRAIN IMG SPCKL TRCK: CPT

## 2025-04-10 PROCEDURE — 93306 TTE W/DOPPLER COMPLETE: CPT

## 2025-04-10 PROCEDURE — 25010000002 POTASSIUM CHLORIDE 10 MEQ/100ML SOLUTION: Performed by: INTERNAL MEDICINE

## 2025-04-10 PROCEDURE — 25010000002 HEPARIN LOCK FLUSH PER 10 UNITS: Performed by: INTERNAL MEDICINE

## 2025-04-10 PROCEDURE — 96365 THER/PROPH/DIAG IV INF INIT: CPT

## 2025-04-10 PROCEDURE — 25810000003 SODIUM CHLORIDE 0.9 % SOLUTION: Performed by: INTERNAL MEDICINE

## 2025-04-10 RX ORDER — POTASSIUM CHLORIDE 7.45 MG/ML
10 INJECTION INTRAVENOUS ONCE
Status: DISCONTINUED | OUTPATIENT
Start: 2025-04-10 | End: 2025-04-10

## 2025-04-10 RX ORDER — POTASSIUM CHLORIDE 7.45 MG/ML
10 INJECTION INTRAVENOUS
Status: COMPLETED | OUTPATIENT
Start: 2025-04-10 | End: 2025-04-10

## 2025-04-10 RX ORDER — HEPARIN SODIUM (PORCINE) LOCK FLUSH IV SOLN 100 UNIT/ML 100 UNIT/ML
500 SOLUTION INTRAVENOUS AS NEEDED
Status: DISCONTINUED | OUTPATIENT
Start: 2025-04-10 | End: 2025-04-11 | Stop reason: HOSPADM

## 2025-04-10 RX ORDER — SODIUM CHLORIDE 0.9 % (FLUSH) 0.9 %
20 SYRINGE (ML) INJECTION AS NEEDED
OUTPATIENT
Start: 2025-04-10

## 2025-04-10 RX ORDER — HEPARIN SODIUM (PORCINE) LOCK FLUSH IV SOLN 100 UNIT/ML 100 UNIT/ML
500 SOLUTION INTRAVENOUS AS NEEDED
OUTPATIENT
Start: 2025-04-10

## 2025-04-10 RX ORDER — SODIUM CHLORIDE 9 MG/ML
20 INJECTION, SOLUTION INTRAVENOUS ONCE
Status: DISCONTINUED | OUTPATIENT
Start: 2025-04-10 | End: 2025-04-11 | Stop reason: HOSPADM

## 2025-04-10 RX ORDER — SODIUM CHLORIDE 0.9 % (FLUSH) 0.9 %
20 SYRINGE (ML) INJECTION AS NEEDED
Status: DISCONTINUED | OUTPATIENT
Start: 2025-04-10 | End: 2025-04-11 | Stop reason: HOSPADM

## 2025-04-10 RX ADMIN — Medication 20 ML: at 12:59

## 2025-04-10 RX ADMIN — POTASSIUM CHLORIDE 10 MEQ: 7.46 INJECTION, SOLUTION INTRAVENOUS at 11:46

## 2025-04-10 RX ADMIN — POTASSIUM CHLORIDE 10 MEQ: 7.46 INJECTION, SOLUTION INTRAVENOUS at 09:52

## 2025-04-10 RX ADMIN — POTASSIUM CHLORIDE 10 MEQ: 7.46 INJECTION, SOLUTION INTRAVENOUS at 10:44

## 2025-04-10 RX ADMIN — HEPARIN 500 UNITS: 100 SYRINGE at 12:59

## 2025-04-10 RX ADMIN — SODIUM CHLORIDE 500 ML: 900 INJECTION, SOLUTION INTRAVENOUS at 09:50

## 2025-04-10 NOTE — PROGRESS NOTES
"Pt here for IV KCL and IVF, pt reports having  ongoing unchanged loose stools of approx 3 times a day,  and has been taking lomotil twice daily and had been taking immodium but has ran out of med and reports picking up more immodium today.   IV KCL administered as per MAR and pt requested IVF,  during infusion pt reports that has noted magui leg swelling over past few days, pt reports has noted face \"puffy\" over the past few weeks,  pt reports has felt better and has been on her feet more for work and has been walking, pt denies pain to legs or SOA.   Discussed with pt to elevate legs and stopped IVF, instructed pt to notify office for change and pt v/u and agreement.       Discussed repeat lab with Emily ENCISO NP and will recheck on Mon at scheduled visit and pt to increase high potassium foods,  reviewed with pt to increase potatoes, bananas and pt v/u and agreement.   "

## 2025-04-14 ENCOUNTER — TELEPHONE (OUTPATIENT)
Dept: LAB | Facility: HOSPITAL | Age: 45
End: 2025-04-14
Payer: COMMERCIAL

## 2025-04-14 NOTE — TELEPHONE ENCOUNTER
Attempted to call patient. Left message for her to call the office.  Per Marianela RODRIGUEZ, with [employer] the patients treatment is not approved and will need to reschedule today's appt.  Was unable to speak with her and advised her to call the office back to reschedule.

## 2025-04-14 NOTE — TELEPHONE ENCOUNTER
Hub staff attempted to follow warm transfer process and was unsuccessful     Caller: Mary Ann Fletcher    Relationship to patient: Self    Best call back number:   Telephone Information:   Mobile [phone]     Patient is needing: CALLING THE OFFICE BACK.    CALL HER BACK ASAP

## 2025-04-17 ENCOUNTER — HOSPITAL ENCOUNTER (OUTPATIENT)
Dept: ONCOLOGY | Facility: HOSPITAL | Age: 45
Discharge: HOME | End: 2025-04-17
Payer: COMMERCIAL

## 2025-04-17 VITALS
OXYGEN SATURATION: 97 % | HEIGHT: 63 IN | TEMPERATURE: 98 F | BODY MASS INDEX: 45.23 KG/M2 | WEIGHT: 255.3 LBS | SYSTOLIC BLOOD PRESSURE: 141 MMHG | RESPIRATION RATE: 16 BRPM | DIASTOLIC BLOOD PRESSURE: 80 MMHG | HEART RATE: 119 BPM

## 2025-04-17 DIAGNOSIS — Z17.0 BREAST CANCER, STAGE 1, ESTROGEN RECEPTOR POSITIVE, LEFT: Primary | ICD-10-CM

## 2025-04-17 DIAGNOSIS — C50.912 BREAST CANCER, STAGE 1, ESTROGEN RECEPTOR POSITIVE, LEFT: Primary | ICD-10-CM

## 2025-04-17 LAB
ALP BLD-CCNC: 42 U/L (ref 42–141)
B-HCG UR QL: NEGATIVE
BASOPHILS # BLD AUTO: 0.03 10*3/MM3 (ref 0–0.2)
BASOPHILS NFR BLD AUTO: 0.4 % (ref 0–1.5)
BUN BLDA-MCNC: 9 MG/DL (ref 7–22)
CALCIUM BLD QL: 9 MG/DL (ref 8–10.3)
CHLORIDE BLDA-SCNC: 106 MMOL/L (ref 98–108)
CO2 BLDA-SCNC: 27 MMOL/L (ref 18–33)
CREAT BLDA-MCNC: 1.1 MG/DL (ref 0.6–1.2)
DEPRECATED RDW RBC AUTO: 61.8 FL (ref 37–54)
EGFRCR SERPLBLD CKD-EPI 2021: 64.1 ML/MIN/1.73
EOSINOPHIL # BLD AUTO: 0.01 10*3/MM3 (ref 0–0.4)
EOSINOPHIL NFR BLD AUTO: 0.1 % (ref 0.3–6.2)
ERYTHROCYTE [DISTWIDTH] IN BLOOD BY AUTOMATED COUNT: 15.5 % (ref 12.3–15.4)
GLUCOSE BLDC GLUCOMTR-MCNC: 107 MG/DL (ref 73–118)
HCT VFR BLD AUTO: 31.6 % (ref 34–46.6)
HGB BLD-MCNC: 9.9 G/DL (ref 12–15.9)
LYMPHOCYTES # BLD AUTO: 2.66 10*3/MM3 (ref 0.7–3.1)
LYMPHOCYTES NFR BLD AUTO: 38.9 % (ref 19.6–45.3)
MCH RBC QN AUTO: 35.7 PG (ref 26.6–33)
MCHC RBC AUTO-ENTMCNC: 31.3 G/DL (ref 31.5–35.7)
MCV RBC AUTO: 114.1 FL (ref 79–97)
MONOCYTES # BLD AUTO: 0.51 10*3/MM3 (ref 0.1–0.9)
MONOCYTES NFR BLD AUTO: 7.5 % (ref 5–12)
NEUTROPHILS NFR BLD AUTO: 3.63 10*3/MM3 (ref 1.7–7)
NEUTROPHILS NFR BLD AUTO: 53.1 % (ref 42.7–76)
PLATELET # BLD AUTO: 165 10*3/MM3 (ref 140–450)
PMV BLD AUTO: 11 FL (ref 6–12)
POC ALBUMIN: 3.4 G/L (ref 3.3–5.5)
POC ALT (SGPT): 23 U/L (ref 10–47)
POC AST (SGOT): 27 U/L (ref 11–38)
POC TOTAL BILIRUBIN: 0.5 MG/DL (ref 0.2–1.6)
POC TOTAL PROTEIN: 6.4 G/DL (ref 6.4–8.1)
POTASSIUM BLDA-SCNC: 3.6 MMOL/L (ref 3.6–5.1)
RBC # BLD AUTO: 2.77 10*6/MM3 (ref 3.77–5.28)
SODIUM BLD-SCNC: 141 MMOL/L (ref 128–145)
WBC NRBC COR # BLD AUTO: 6.84 10*3/MM3 (ref 3.4–10.8)

## 2025-04-17 PROCEDURE — 85025 COMPLETE CBC W/AUTO DIFF WBC: CPT | Performed by: INTERNAL MEDICINE

## 2025-04-17 PROCEDURE — 25010000002 PERTUZUMAB 420 MG/14ML SOLUTION 420 MG VIAL: Performed by: PHYSICIAN ASSISTANT

## 2025-04-17 PROCEDURE — 96417 CHEMO IV INFUS EACH ADDL SEQ: CPT

## 2025-04-17 PROCEDURE — 96413 CHEMO IV INFUSION 1 HR: CPT

## 2025-04-17 PROCEDURE — 81025 URINE PREGNANCY TEST: CPT | Performed by: INTERNAL MEDICINE

## 2025-04-17 PROCEDURE — 80053 COMPREHEN METABOLIC PANEL: CPT

## 2025-04-17 PROCEDURE — 25810000003 SODIUM CHLORIDE 0.9 % SOLUTION: Performed by: PHYSICIAN ASSISTANT

## 2025-04-17 PROCEDURE — 25010000002 HEPARIN LOCK FLUSH PER 10 UNITS: Performed by: INTERNAL MEDICINE

## 2025-04-17 PROCEDURE — 25010000002 TRASTUZUMAB-ANNS 420 MG RECONSTITUTED SOLUTION 1 EACH VIAL: Performed by: PHYSICIAN ASSISTANT

## 2025-04-17 PROCEDURE — 25810000003 SODIUM CHLORIDE 0.9 % SOLUTION 250 ML FLEX CONT: Performed by: PHYSICIAN ASSISTANT

## 2025-04-17 RX ORDER — HEPARIN SODIUM (PORCINE) LOCK FLUSH IV SOLN 100 UNIT/ML 100 UNIT/ML
500 SOLUTION INTRAVENOUS AS NEEDED
Status: DISCONTINUED | OUTPATIENT
Start: 2025-04-17 | End: 2025-04-18 | Stop reason: HOSPADM

## 2025-04-17 RX ORDER — SODIUM CHLORIDE 0.9 % (FLUSH) 0.9 %
20 SYRINGE (ML) INJECTION AS NEEDED
Status: DISCONTINUED | OUTPATIENT
Start: 2025-04-17 | End: 2025-04-18 | Stop reason: HOSPADM

## 2025-04-17 RX ORDER — SODIUM CHLORIDE 9 MG/ML
20 INJECTION, SOLUTION INTRAVENOUS ONCE
Status: COMPLETED | OUTPATIENT
Start: 2025-04-17 | End: 2025-04-17

## 2025-04-17 RX ORDER — ONDANSETRON 8 MG/1
8 TABLET, FILM COATED ORAL 3 TIMES DAILY PRN
Qty: 30 TABLET | Refills: 5 | Status: SHIPPED | OUTPATIENT
Start: 2025-04-17

## 2025-04-17 RX ORDER — SODIUM CHLORIDE 9 MG/ML
20 INJECTION, SOLUTION INTRAVENOUS ONCE
Status: CANCELLED | OUTPATIENT
Start: 2025-04-17

## 2025-04-17 RX ORDER — HEPARIN SODIUM (PORCINE) LOCK FLUSH IV SOLN 100 UNIT/ML 100 UNIT/ML
500 SOLUTION INTRAVENOUS AS NEEDED
OUTPATIENT
Start: 2025-04-17

## 2025-04-17 RX ORDER — SODIUM CHLORIDE 0.9 % (FLUSH) 0.9 %
20 SYRINGE (ML) INJECTION AS NEEDED
OUTPATIENT
Start: 2025-04-17

## 2025-04-17 RX ADMIN — HEPARIN 500 UNITS: 100 SYRINGE at 12:12

## 2025-04-17 RX ADMIN — TRASTUZUMAB-ANNS 690 MG: 420 INJECTION, POWDER, LYOPHILIZED, FOR SOLUTION INTRAVENOUS at 11:28

## 2025-04-17 RX ADMIN — PERTUZUMAB 420 MG: 30 INJECTION, SOLUTION, CONCENTRATE INTRAVENOUS at 10:22

## 2025-04-17 RX ADMIN — SODIUM CHLORIDE 20 ML/HR: 9 INJECTION, SOLUTION INTRAVENOUS at 10:22

## 2025-04-17 RX ADMIN — Medication 10 ML: at 12:11

## 2025-04-17 NOTE — PROGRESS NOTES
Pt here for D1C1  perjeta/kanjinti.Pt declines any new complaints.Using sterile technique, port accessed for blood collection and treatment. 10 ml blood wasted prior to collecting blood for ordered labs. Pt states she has not had a period since December and is not sexually active. Urine pregnancy test collected.Labs within parameters. Treatment given without difficulty and pt tolerated  well. Pt discharged and AVS given.

## 2025-04-22 ENCOUNTER — OFFICE VISIT (OUTPATIENT)
Dept: FAMILY MEDICINE CLINIC | Facility: CLINIC | Age: 45
End: 2025-04-22
Payer: COMMERCIAL

## 2025-04-22 VITALS
TEMPERATURE: 97.7 F | BODY MASS INDEX: 41.78 KG/M2 | RESPIRATION RATE: 18 BRPM | WEIGHT: 235.8 LBS | SYSTOLIC BLOOD PRESSURE: 128 MMHG | OXYGEN SATURATION: 98 % | DIASTOLIC BLOOD PRESSURE: 82 MMHG | HEART RATE: 104 BPM | HEIGHT: 63 IN

## 2025-04-22 DIAGNOSIS — J30.89 SEASONAL ALLERGIC RHINITIS DUE TO OTHER ALLERGIC TRIGGER: Primary | ICD-10-CM

## 2025-04-22 PROCEDURE — 99213 OFFICE O/P EST LOW 20 MIN: CPT | Performed by: NURSE PRACTITIONER

## 2025-04-22 RX ORDER — ALBUTEROL SULFATE 90 UG/1
2 INHALANT RESPIRATORY (INHALATION) EVERY 4 HOURS PRN
Qty: 18 G | Refills: 0 | Status: SHIPPED | OUTPATIENT
Start: 2025-04-22

## 2025-04-22 RX ORDER — LORATADINE 10 MG/1
10 TABLET ORAL DAILY
COMMUNITY

## 2025-04-22 RX ORDER — MONTELUKAST SODIUM 10 MG/1
10 TABLET ORAL NIGHTLY
Qty: 90 TABLET | Refills: 0 | Status: SHIPPED | OUTPATIENT
Start: 2025-04-22

## 2025-04-22 NOTE — PROGRESS NOTES
"Chief Complaint  Nasal Congestion (Throat scratchy the last two days, head congestions. Nausea, but thinks it is from infusion. Chest congestion today )    Subjective        Mary Ann Fletcher presents to [facility]  History of Present Illness    Upper respiratory symptoms:   Onset of symptoms 2 days ago. Severity of symptoms are mild to moderate. Status is no change to worsening. Frequency is persistent. Context includes history of allergies.  First immunotherapy without chemo last week; fewer side effects (nausea, diarrhea, sweats). Symptoms are aggravated by lying down. Symptoms are relieved by antihistamine (claritin).  Associated symptoms include scratchy throat, nasal congestion, and wheezing/chest tightness. Pertinent negatives include cough, dyspnea, facial pain, fever, headache, hemoptysis, myalgia, otalgia, pharyngitis, post nasal drainage, rash, sinus pressure, sputum, tooth pain, and vomiting.       Objective   Vital Signs:  /82 (BP Location: Left arm, Patient Position: Sitting, Cuff Size: Large Adult)   Pulse 104   Temp 97.7 °F (36.5 °C) (Temporal)   Resp 18   Ht 160 cm (63\")   Wt 107 kg (235 lb 12.8 oz)   SpO2 98%   BMI 41.77 kg/m²   Estimated body mass index is 41.77 kg/m² as calculated from the following:    Height as of this encounter: 160 cm (63\").    Weight as of this encounter: 107 kg (235 lb 12.8 oz).            Physical Exam  Constitutional:       General: She is not in acute distress.  HENT:      Head: Normocephalic and atraumatic.      Right Ear: Tympanic membrane, ear canal and external ear normal.      Left Ear: Tympanic membrane, ear canal and external ear normal.      Nose: Nose normal. Congestion present. No rhinorrhea.      Right Sinus: No maxillary sinus tenderness or frontal sinus tenderness.      Left Sinus: No maxillary sinus tenderness or frontal sinus tenderness.      Mouth/Throat:      Mouth: Mucous membranes are moist.      Pharynx: " Postnasal drip present. No posterior oropharyngeal erythema.   Eyes:      Conjunctiva/sclera: Conjunctivae normal.   Cardiovascular:      Rate and Rhythm: Normal rate and regular rhythm.      Heart sounds: Normal heart sounds, S1 normal and S2 normal.   Pulmonary:      Effort: Pulmonary effort is normal.      Breath sounds: Normal breath sounds.   Musculoskeletal:      Cervical back: Neck supple.   Lymphadenopathy:      Cervical: No cervical adenopathy.   Skin:     General: Skin is warm and dry.      Findings: No rash.   Neurological:      Mental Status: She is alert and oriented to person, place, and time.      Gait: Gait is intact.   Psychiatric:         Mood and Affect: Mood normal.         Thought Content: Thought content normal.        Result Review :    CMP          4/5/2025    08:26 4/7/2025    17:57 4/17/2025    08:42   CMP   Glucose 110  96     BUN 4  6     Creatinine 1.16  1.11  1.10    EGFR 60.1  63.4  64.1    Sodium 139  142     Potassium 2.9  2.8     Chloride 103  103     Calcium 8.4  9.0     Total Protein 6.3  6.5     Albumin 3.9  4.2     Globulin 2.4  2.3     Total Bilirubin 0.3  0.2     Alkaline Phosphatase 73  63     AST (SGOT) 19  19     ALT (SGPT) 22  26     Albumin/Globulin Ratio 1.6  1.8     BUN/Creatinine Ratio 3.4  5.4     Anion Gap 9.9  13.1       CBC          3/24/2025    09:05 4/3/2025    15:00 4/17/2025    08:25   CBC   WBC 12.67  9.63  6.84    RBC 2.92  2.72  2.77    Hemoglobin 10.2  9.8  9.9    Hematocrit 33.0  29.9  31.6    .0  109.9  114.1    MCH 34.9  36.0  35.7    MCHC 30.9  32.8  31.3    RDW 16.9  14.5  15.5    Platelets 205  160  165                Assessment and Plan   Diagnoses and all orders for this visit:    1. Seasonal allergic rhinitis due to other allergic trigger (Primary)  -     montelukast (Singulair) 10 MG tablet; Take 1 tablet by mouth Every Night.  Dispense: 90 tablet; Refill: 0  -     albuterol sulfate  (90 Base) MCG/ACT inhaler; Inhale 2 puffs Every 4  (Four) Hours As Needed for Wheezing or Shortness of Air.  Dispense: 18 g; Refill: 0    Recommended continuing Claritin or Zyrtec over-the-counter daily.  Flonase over-the-counter daily for any symptoms such as sinus pressure/ear pressure.         Follow Up   Return for Next scheduled follow up.  Patient was given instructions and counseling regarding her condition or for health maintenance advice. Please see specific information pulled into the AVS if appropriate.
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
## 2025-05-01 ENCOUNTER — OFFICE VISIT (OUTPATIENT)
Dept: ONCOLOGY | Facility: CLINIC | Age: 45
End: 2025-05-01
Payer: COMMERCIAL

## 2025-05-01 ENCOUNTER — APPOINTMENT (OUTPATIENT)
Dept: ONCOLOGY | Facility: HOSPITAL | Age: 45
End: 2025-05-01
Payer: COMMERCIAL

## 2025-05-01 ENCOUNTER — DOCUMENTATION (OUTPATIENT)
Dept: ONCOLOGY | Facility: CLINIC | Age: 45
End: 2025-05-01
Payer: COMMERCIAL

## 2025-05-01 ENCOUNTER — CONSULT (OUTPATIENT)
Dept: RADIATION ONCOLOGY | Facility: HOSPITAL | Age: 45
End: 2025-05-01
Payer: COMMERCIAL

## 2025-05-01 ENCOUNTER — HOSPITAL ENCOUNTER (OUTPATIENT)
Dept: ONCOLOGY | Facility: HOSPITAL | Age: 45
Discharge: HOME | End: 2025-05-01
Admitting: INTERNAL MEDICINE
Payer: COMMERCIAL

## 2025-05-01 VITALS
SYSTOLIC BLOOD PRESSURE: 110 MMHG | OXYGEN SATURATION: 94 % | HEART RATE: 121 BPM | BODY MASS INDEX: 45.35 KG/M2 | RESPIRATION RATE: 18 BRPM | WEIGHT: 256 LBS | DIASTOLIC BLOOD PRESSURE: 70 MMHG

## 2025-05-01 VITALS
HEART RATE: 121 BPM | TEMPERATURE: 97.8 F | RESPIRATION RATE: 18 BRPM | HEIGHT: 63 IN | DIASTOLIC BLOOD PRESSURE: 70 MMHG | WEIGHT: 254 LBS | BODY MASS INDEX: 45 KG/M2 | SYSTOLIC BLOOD PRESSURE: 110 MMHG

## 2025-05-01 DIAGNOSIS — C50.912 BREAST CANCER, STAGE 1, ESTROGEN RECEPTOR POSITIVE, LEFT: Primary | ICD-10-CM

## 2025-05-01 DIAGNOSIS — Z17.0 BREAST CANCER, STAGE 1, ESTROGEN RECEPTOR POSITIVE, LEFT: Primary | ICD-10-CM

## 2025-05-01 LAB
BASOPHILS # BLD AUTO: 0.02 10*3/MM3 (ref 0–0.2)
BASOPHILS NFR BLD AUTO: 0.2 % (ref 0–1.5)
DEPRECATED RDW RBC AUTO: 53.4 FL (ref 37–54)
EOSINOPHIL # BLD AUTO: 0.27 10*3/MM3 (ref 0–0.4)
EOSINOPHIL NFR BLD AUTO: 3.1 % (ref 0.3–6.2)
ERYTHROCYTE [DISTWIDTH] IN BLOOD BY AUTOMATED COUNT: 13.6 % (ref 12.3–15.4)
HCT VFR BLD AUTO: 35.1 % (ref 34–46.6)
HGB BLD-MCNC: 11.1 G/DL (ref 12–15.9)
LYMPHOCYTES # BLD AUTO: 3.22 10*3/MM3 (ref 0.7–3.1)
LYMPHOCYTES NFR BLD AUTO: 37.2 % (ref 19.6–45.3)
MCH RBC QN AUTO: 35.1 PG (ref 26.6–33)
MCHC RBC AUTO-ENTMCNC: 31.6 G/DL (ref 31.5–35.7)
MCV RBC AUTO: 111.1 FL (ref 79–97)
MONOCYTES # BLD AUTO: 0.6 10*3/MM3 (ref 0.1–0.9)
MONOCYTES NFR BLD AUTO: 6.9 % (ref 5–12)
NEUTROPHILS NFR BLD AUTO: 4.54 10*3/MM3 (ref 1.7–7)
NEUTROPHILS NFR BLD AUTO: 52.6 % (ref 42.7–76)
PLATELET # BLD AUTO: 311 10*3/MM3 (ref 140–450)
PMV BLD AUTO: 10.2 FL (ref 6–12)
RBC # BLD AUTO: 3.16 10*6/MM3 (ref 3.77–5.28)
WBC NRBC COR # BLD AUTO: 8.65 10*3/MM3 (ref 3.4–10.8)

## 2025-05-01 PROCEDURE — 25010000002 HEPARIN LOCK FLUSH PER 10 UNITS: Performed by: INTERNAL MEDICINE

## 2025-05-01 PROCEDURE — 36591 DRAW BLOOD OFF VENOUS DEVICE: CPT

## 2025-05-01 PROCEDURE — 85025 COMPLETE CBC W/AUTO DIFF WBC: CPT | Performed by: INTERNAL MEDICINE

## 2025-05-01 PROCEDURE — G0463 HOSPITAL OUTPT CLINIC VISIT: HCPCS | Performed by: INTERNAL MEDICINE

## 2025-05-01 RX ORDER — SODIUM CHLORIDE 0.9 % (FLUSH) 0.9 %
20 SYRINGE (ML) INJECTION AS NEEDED
OUTPATIENT
Start: 2025-05-01

## 2025-05-01 RX ORDER — GABAPENTIN 300 MG/1
300 CAPSULE ORAL NIGHTLY
Qty: 30 CAPSULE | Refills: 3 | Status: SHIPPED | OUTPATIENT
Start: 2025-05-01

## 2025-05-01 RX ORDER — HEPARIN SODIUM (PORCINE) LOCK FLUSH IV SOLN 100 UNIT/ML 100 UNIT/ML
500 SOLUTION INTRAVENOUS AS NEEDED
OUTPATIENT
Start: 2025-05-01

## 2025-05-01 RX ORDER — SODIUM CHLORIDE 0.9 % (FLUSH) 0.9 %
20 SYRINGE (ML) INJECTION AS NEEDED
Status: DISCONTINUED | OUTPATIENT
Start: 2025-05-01 | End: 2025-05-02 | Stop reason: HOSPADM

## 2025-05-01 RX ORDER — HEPARIN SODIUM (PORCINE) LOCK FLUSH IV SOLN 100 UNIT/ML 100 UNIT/ML
500 SOLUTION INTRAVENOUS AS NEEDED
Status: DISCONTINUED | OUTPATIENT
Start: 2025-05-01 | End: 2025-05-02 | Stop reason: HOSPADM

## 2025-05-01 RX ADMIN — HEPARIN SODIUM (PORCINE) LOCK FLUSH IV SOLN 100 UNIT/ML 500 UNITS: 100 SOLUTION at 10:29

## 2025-05-01 RX ADMIN — Medication 20 ML: at 10:28
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
## 2025-05-01 NOTE — PROGRESS NOTES
Port accessed and flushed with good blood return noted. 10cc of blood wasted prior to specimen collection. Blood specimen obtained and sent to lab for processing per protocol.  Port flushed with saline and heparin prior to needle removal. Pt to lobby for md sadlert.

## 2025-05-01 NOTE — PROGRESS NOTES
"Distress Screening Follow-up    Diagnosis:     Location of Distress Screening: Radiation Oncology    Distress Level: 8 (5/1/2025  9:03 AM)    Physical Concerns:    Fatigue: Y  Pain: Y  Sleep: Y  Substance abuse: N  Tobacco use: N  Memory or concentration: Y  Sexual health: N  Changes in eating: Y (Binge)  Loss or change of physical abilities: Y    Practical Problems:    : N  Housing/Utilities: N  Transportation: N  Treatment decisions: Y  Taking care of myself: N  Taking care of others: N  Work: Y  School: N  Finances: Y  Insurance: N  Having enough food: N  Access to medicine: N    Emotional Concerns:    Worry or anxiety: Y  Sadness or depression: Y  Loss of interest or enjoyment: Y  Grief or loss: Y  Fear: Y  Loneliness: Y  Anger: Y  Changes in appearance: Y  Feelings of worthlessness or being a burden: Y    Family Concerns:    Ability to have children: N  Relationship with spouse or partner: N  Relationship with children: N  Relationship with family members: N  Relationship with friends or coworkers: Y  Communication with health care team: N    Spiritual Concerns:    Sense of meaning or purpose: N  Changes in rosalba or beliefs: N  Death, dying or afterlife: N  Conflict between beliefs and cancer treatments: N  Relationship with the sacred: N  Ritual or dietary needs: N     Interventions:        Comments:OSW met with pt due to high DST score. Pt states that she has a great support system and has been doing pretty well; however, her job situation is changing and that is creating more stress.  We discussed her journey and how she wants to handle a few situations.  She feels empowered to speak up and voice her concerns to family and friends.  She is also interested in some peer support or possibly counseling.  Pt wants to start with \"[facility] [facility] [employer]\" and has information to get connected.  We discussed support group resources and pt will reach out if she decides to pursue that.  She is taking an " antidepressant already and feels that does help.  Encouraged pt to reach out if additional support or resources are needed.  Pt v/u.

## 2025-05-02 ENCOUNTER — TELEPHONE (OUTPATIENT)
Dept: ONCOLOGY | Facility: CLINIC | Age: 45
End: 2025-05-02
Payer: COMMERCIAL

## 2025-05-02 NOTE — TELEPHONE ENCOUNTER
----- Message from Nora Saldivar sent at 5/1/2025  4:44 PM EDT -----  Lets hold Herceptin Perjeta till she completes her surgery May 21.  I have notified patient.  Please cancel her appointment for May 8 as it is very close to her surgery. Thanks

## 2025-05-09 ENCOUNTER — HOSPITAL ENCOUNTER (OUTPATIENT)
Dept: CARDIOLOGY | Facility: HOSPITAL | Age: 45
Discharge: HOME | End: 2025-05-09
Payer: COMMERCIAL

## 2025-05-09 ENCOUNTER — HOSPITAL ENCOUNTER (OUTPATIENT)
Dept: GENERAL RADIOLOGY | Facility: HOSPITAL | Age: 45
Discharge: HOME | End: 2025-05-09
Payer: COMMERCIAL

## 2025-05-09 ENCOUNTER — LAB (OUTPATIENT)
Dept: LAB | Facility: HOSPITAL | Age: 45
End: 2025-05-09
Payer: COMMERCIAL

## 2025-05-09 DIAGNOSIS — Z17.0 BREAST CANCER, STAGE 1, ESTROGEN RECEPTOR POSITIVE, LEFT: ICD-10-CM

## 2025-05-09 DIAGNOSIS — C50.912 BREAST CANCER, STAGE 1, ESTROGEN RECEPTOR POSITIVE, LEFT: ICD-10-CM

## 2025-05-09 LAB
ABO GROUP BLD: NORMAL
ANION GAP SERPL CALCULATED.3IONS-SCNC: 11 MMOL/L (ref 5–15)
BASOPHILS # BLD AUTO: 0.02 10*3/MM3 (ref 0–0.2)
BASOPHILS NFR BLD AUTO: 0.3 % (ref 0–1.5)
BLD GP AB SCN SERPL QL: NEGATIVE
BUN SERPL-MCNC: 9 MG/DL (ref 6–20)
BUN/CREAT SERPL: 9.9 (ref 7–25)
CALCIUM SPEC-SCNC: 9.2 MG/DL (ref 8.6–10.5)
CHLORIDE SERPL-SCNC: 105 MMOL/L (ref 98–107)
CO2 SERPL-SCNC: 23 MMOL/L (ref 22–29)
CREAT SERPL-MCNC: 0.91 MG/DL (ref 0.57–1)
DEPRECATED RDW RBC AUTO: 52.7 FL (ref 37–54)
EGFRCR SERPLBLD CKD-EPI 2021: 80.4 ML/MIN/1.73
EOSINOPHIL # BLD AUTO: 0.44 10*3/MM3 (ref 0–0.4)
EOSINOPHIL NFR BLD AUTO: 7 % (ref 0.3–6.2)
ERYTHROCYTE [DISTWIDTH] IN BLOOD BY AUTOMATED COUNT: 13.6 % (ref 12.3–15.4)
GLUCOSE SERPL-MCNC: 190 MG/DL (ref 65–99)
HBA1C MFR BLD: 5.32 % (ref 4.8–5.6)
HCT VFR BLD AUTO: 33.3 % (ref 34–46.6)
HGB BLD-MCNC: 10.8 G/DL (ref 12–15.9)
IMM GRANULOCYTES # BLD AUTO: 0.03 10*3/MM3 (ref 0–0.05)
IMM GRANULOCYTES NFR BLD AUTO: 0.5 % (ref 0–0.5)
LYMPHOCYTES # BLD AUTO: 2.53 10*3/MM3 (ref 0.7–3.1)
LYMPHOCYTES NFR BLD AUTO: 40.1 % (ref 19.6–45.3)
MCH RBC QN AUTO: 34.4 PG (ref 26.6–33)
MCHC RBC AUTO-ENTMCNC: 32.4 G/DL (ref 31.5–35.7)
MCV RBC AUTO: 106.1 FL (ref 79–97)
MONOCYTES # BLD AUTO: 0.21 10*3/MM3 (ref 0.1–0.9)
MONOCYTES NFR BLD AUTO: 3.3 % (ref 5–12)
NEUTROPHILS NFR BLD AUTO: 3.08 10*3/MM3 (ref 1.7–7)
NEUTROPHILS NFR BLD AUTO: 48.8 % (ref 42.7–76)
NRBC BLD AUTO-RTO: 0 /100 WBC (ref 0–0.2)
PLATELET # BLD AUTO: 277 10*3/MM3 (ref 140–450)
PMV BLD AUTO: 10.7 FL (ref 6–12)
POTASSIUM SERPL-SCNC: 3.3 MMOL/L (ref 3.5–5.2)
RBC # BLD AUTO: 3.14 10*6/MM3 (ref 3.77–5.28)
RH BLD: POSITIVE
SODIUM SERPL-SCNC: 139 MMOL/L (ref 136–145)
T&S EXPIRATION DATE: NORMAL
WBC NRBC COR # BLD AUTO: 6.31 10*3/MM3 (ref 3.4–10.8)

## 2025-05-09 PROCEDURE — 86900 BLOOD TYPING SEROLOGIC ABO: CPT

## 2025-05-09 PROCEDURE — 71046 X-RAY EXAM CHEST 2 VIEWS: CPT

## 2025-05-09 PROCEDURE — 36415 COLL VENOUS BLD VENIPUNCTURE: CPT

## 2025-05-09 PROCEDURE — 86901 BLOOD TYPING SEROLOGIC RH(D): CPT | Performed by: SURGERY

## 2025-05-09 PROCEDURE — 86850 RBC ANTIBODY SCREEN: CPT | Performed by: SURGERY

## 2025-05-09 PROCEDURE — 85025 COMPLETE CBC W/AUTO DIFF WBC: CPT

## 2025-05-09 PROCEDURE — 83036 HEMOGLOBIN GLYCOSYLATED A1C: CPT | Performed by: SURGERY

## 2025-05-09 PROCEDURE — 86900 BLOOD TYPING SEROLOGIC ABO: CPT | Performed by: SURGERY

## 2025-05-09 PROCEDURE — 93005 ELECTROCARDIOGRAM TRACING: CPT | Performed by: SURGERY

## 2025-05-09 PROCEDURE — 80048 BASIC METABOLIC PNL TOTAL CA: CPT

## 2025-05-09 PROCEDURE — 86901 BLOOD TYPING SEROLOGIC RH(D): CPT

## 2025-05-12 LAB
QT INTERVAL: 378 MS
QTC INTERVAL: 467 MS

## 2025-05-12 NOTE — PAT
K+ = 3.3.  ADVISED PT TO INCREASE k+ IN HER DIET.  SHE ALSO SAID SHE HAS A RX FOR k+ THAT SHE just restarted yesterday.  Will repeat level day of surgery.  Order placed per anesthesia protocol.

## 2025-05-14 ENCOUNTER — DOCUMENTATION (OUTPATIENT)
Dept: PHYSICAL THERAPY | Facility: CLINIC | Age: 45
End: 2025-05-14
Payer: COMMERCIAL

## 2025-05-14 NOTE — PROGRESS NOTES
Physical Therapy Discharge Summary  [address] [address]    Patient: Mary Ann Fletcher   : 1980  Diagnosis/ICD-10 Code:  Contusion of chest wall, unspecified laterality, subsequent encounter [S20.219D]  Referring practitioner: BRAXTON Gibbs  Date of Initial Visit: Type: THERAPY  Noted: 10/3/2024  Last Visit Date: 10/17/2024  Patient seen for 3 sessions    Discharge Status of Patient: Pt canceled appointments due to other medical issues/treatment. Pt never returned to PT and POC has .      Goals: Goal status is undetermined as pt never returned to PT after 10/17/24     Discharge Plan: No specific D/C instructions were given as pt never returned to PT after 10/17/24.      Comments: Pt was given HEP during sessions.      Date of Discharge: 25          Che Gibson, PT  Physical Therapist  [place] License: [id number]

## 2025-05-20 ENCOUNTER — ANESTHESIA EVENT (OUTPATIENT)
Dept: PERIOP | Facility: HOSPITAL | Age: 45
End: 2025-05-20
Payer: COMMERCIAL

## 2025-05-20 DIAGNOSIS — J30.89 SEASONAL ALLERGIC RHINITIS DUE TO OTHER ALLERGIC TRIGGER: ICD-10-CM

## 2025-05-20 PROCEDURE — 78195 LYMPH SYSTEM IMAGING: CPT | Performed by: SURGERY

## 2025-05-20 PROCEDURE — 19303 MAST SIMPLE COMPLETE: CPT | Performed by: SURGERY

## 2025-05-20 PROCEDURE — 38525 BIOPSY/REMOVAL LYMPH NODES: CPT | Performed by: SURGERY

## 2025-05-20 RX ORDER — ALBUTEROL SULFATE 90 UG/1
2 INHALANT RESPIRATORY (INHALATION) EVERY 4 HOURS PRN
Qty: 18 G | Refills: 2 | Status: SHIPPED | OUTPATIENT
Start: 2025-05-20
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
## 2025-05-20 NOTE — DISCHARGE INSTRUCTIONS
Post-Op Discharge Instructions - Mastectomy with Reconstruction    You have just been in the hospital for breast surgery. It is normal to feel tired after surgery. Rest is important and will help you feel better. Walking as tolerated is also very helpful for your recovery. Just remember to pace yourself, and rest when you feel tired.     Incision Care  You may shower 24 hours after your surgery.  There will be either surgical glue (Dermabond) or steri-strips over the incision. You can gently wash the area regardless, and pat dry. Leave Steri-strips or Dermabond on until they wear off on their own.  There will be no stitches on the surface of your incision. They are under the skin and will dissolve over time.  Do not soak in a bath tub or go swimming until you have seen your surgeon at your post- op appointment and have discussed this activity. In general, you should not expect to soak in a bath tub or go swimming for at least 3 weeks following your surgery.  Avoid using heat or ice on your incisions for pain relief as your skin will be numb, making it hard to tell what is too hot or too cold, and you could damage your skin.    Pain Medication Instructions - With Narcotics (Opioids):  Take your pain medicine as directed by your surgeon and printed on the prescription bottle.  Please take narcotics with food as they can cause nausea.  Do not drink alcohol, drive, or use machinery while you are taking narcotic pain medicine.  As your pain lessens, decrease the amount of pain medicine you are taking.  Narcotic pain medicine can cause constipation. To make it easier to have a bowel movement:  Drink extra water and other fluids like juice, tea, and broth.  Take a stool softener like docusate. Docusate is also called Colace. You can buy it over the counter.  Any excess narcotic pain medicine should be destroyed. You may dispose of unused narcotic medication at drop boxes in approved disposal locations, such as police  station or local pharmacy. Or mix with coffee grounds or bar litter before disposing in trash.    It is ok to take anti-inflammatory medicines, like ibuprofen (Motrin, Advil) or naproxen (Aleve), unless your surgeon tells you otherwise.  You may take acetaminophen, which is Tylenol.  Do not take more than 3,000 mg of Tylenol in 24 hours.  Tylenol is in many medicines. Do not take Tylenol with another medicine that has Tylenol in it, like Percocet or Vicodin.    Activity  Walking is encouraged. You may climb stairs.  Move your shoulder on the surgery side as tolerated. Don't let it stiffen up.   Restart your normal daily activities when you are able. Slowly start to do more each day as tolerated.  Avoid any contact sports.  You will be given shoulder exercises to work on at home. Contact the surgeon's office if you have questions about these.  Keep in mind that as you start to do more vigorous activity, there is a possibility that the drain output will increase for a few days.  Ask your doctor about getting back to specific exercises and contact sports.  You may drive after the drains are removed, as long as you are not taking any narcotic pain medicine    Drain Care  Strip the tubing 2 to 3 times per day - going from exit site from skin toward the bulb  Empty your drains twice a day, and as needed.  Record the date, time and amount of drainage from each drain in cc's each time bulb is emptied.  If you have more than one drain, record the amounts separately.  Discard the drainage into the toilet after measuring and then wash hands  Please call the office when your drainage is less than a total of 30 ML per day from each drain for 2 days in a row.  Remember to bring your output record with you to doctor's appointment  Always make sure your drains are attached to your clothing. Do not let them hang loose.  It is normal for the fluid in your drains to change from a reddish/pink color to an apple juice color.  If the  drain fluid begins to look milky, thicker than normal, or have a foul smell - please call the office.    After your breast surgery, call your doctor if you have:  Fever higher than 101.5 F  Chills  Redness around your incision  Drainage from your incision  Intolerable pain that is not controlled with pain medicine  Increasing pain  Increasing swelling at the surgical site  A problem with the drains, such as not holding suction, or drain output suddenly stops or rapidly increases.  Swelling down your arm or in your hand  Any other symptoms that worry you  Remember: In an emergency, ALWAYS call 911    Follow Up  You will have a post-op appointment 10 days - 2 weeks after surgery.  If you have any concerns before your post-op appointment, please call your surgeon's office.    Miscellaneous  If your surgeon used blue dye during your surgery, your urine may be blue or green for a number of days after surgery. This is normal.      DRAIN RECORD.  PLEASE RECORD ALL AMOUNTS IN CC'S. AND BRING THIS RECORD WITH YOU ON YOUR RETURN APPOINTMENT.    Record each drain separately.  Please defer to Dr. Peoples's drain care recommendations/instructions if there is any discrepancy.     Date Amount (cc's) Date  Amount (cc's)

## 2025-05-21 ENCOUNTER — ANESTHESIA EVENT CONVERTED (OUTPATIENT)
Dept: ANESTHESIOLOGY | Facility: HOSPITAL | Age: 45
End: 2025-05-21
Payer: COMMERCIAL

## 2025-05-21 ENCOUNTER — ANESTHESIA (OUTPATIENT)
Dept: PERIOP | Facility: HOSPITAL | Age: 45
End: 2025-05-21
Payer: COMMERCIAL

## 2025-05-21 ENCOUNTER — HOSPITAL ENCOUNTER (OUTPATIENT)
Facility: HOSPITAL | Age: 45
Discharge: HOME | End: 2025-05-22
Attending: SURGERY | Admitting: SURGERY
Payer: COMMERCIAL

## 2025-05-21 ENCOUNTER — HOSPITAL ENCOUNTER (OUTPATIENT)
Dept: NUCLEAR MEDICINE | Facility: HOSPITAL | Age: 45
Discharge: HOME | End: 2025-05-21
Payer: COMMERCIAL

## 2025-05-21 DIAGNOSIS — C50.912 BREAST CANCER, STAGE 1, ESTROGEN RECEPTOR POSITIVE, LEFT: ICD-10-CM

## 2025-05-21 DIAGNOSIS — Z17.0 BREAST CANCER, STAGE 1, ESTROGEN RECEPTOR POSITIVE, LEFT: ICD-10-CM

## 2025-05-21 DIAGNOSIS — Z85.3 PERSONAL HISTORY OF BREAST CANCER: ICD-10-CM

## 2025-05-21 DIAGNOSIS — M51.369 DEGENERATION OF INTERVERTEBRAL DISC OF LUMBAR REGION, UNSPECIFIED WHETHER PAIN PRESENT: Primary | ICD-10-CM

## 2025-05-21 PROBLEM — C50.919 BREAST CANCER, STAGE 1, ESTROGEN RECEPTOR POSITIVE: Status: ACTIVE | Noted: 2025-05-21

## 2025-05-21 LAB
B-HCG UR QL: NEGATIVE
POTASSIUM SERPL-SCNC: 3.9 MMOL/L (ref 3.5–5.2)

## 2025-05-21 PROCEDURE — 25010000002 MIDAZOLAM PER 1 MG: Performed by: ANESTHESIOLOGY

## 2025-05-21 PROCEDURE — 25010000002 BUPIVACAINE (PF) 0.25 % SOLUTION: Performed by: SURGERY

## 2025-05-21 PROCEDURE — 25010000002 BUPIVACAINE (PF) 0.5 % SOLUTION: Performed by: ANESTHESIOLOGY

## 2025-05-21 PROCEDURE — 94761 N-INVAS EAR/PLS OXIMETRY MLT: CPT

## 2025-05-21 PROCEDURE — 25010000002 BUPIVACAINE LIPOSOME 1.3 % SUSPENSION: Performed by: ANESTHESIOLOGY

## 2025-05-21 PROCEDURE — 25010000002 PROPOFOL 10 MG/ML EMULSION

## 2025-05-21 PROCEDURE — 38900 IO MAP OF SENT LYMPH NODE: CPT | Performed by: SURGERY

## 2025-05-21 PROCEDURE — 25810000003 LACTATED RINGERS PER 1000 ML

## 2025-05-21 PROCEDURE — 25010000002 HYDROMORPHONE 1 MG/ML SOLUTION

## 2025-05-21 PROCEDURE — 25010000002 HEPARIN (PORCINE) PER 1000 UNITS: Performed by: SURGERY

## 2025-05-21 PROCEDURE — 25010000002 LIDOCAINE PF 2% 2 % SOLUTION

## 2025-05-21 PROCEDURE — 88342 IMHCHEM/IMCYTCHM 1ST ANTB: CPT | Performed by: SURGERY

## 2025-05-21 PROCEDURE — 25810000003 LACTATED RINGERS PER 1000 ML: Performed by: SURGERY

## 2025-05-21 PROCEDURE — 25010000002 VANCOMYCIN 1 G RECONSTITUTED SOLUTION 1 EACH VIAL: Performed by: SURGERY

## 2025-05-21 PROCEDURE — 94799 UNLISTED PULMONARY SVC/PX: CPT

## 2025-05-21 PROCEDURE — 25010000002 CEFAZOLIN PER 500 MG: Performed by: SURGERY

## 2025-05-21 PROCEDURE — A9520 TC99 TILMANOCEPT DIAG 0.5MCI: HCPCS | Performed by: SURGERY

## 2025-05-21 PROCEDURE — C1789 PROSTHESIS, BREAST, IMP: HCPCS | Performed by: SURGERY

## 2025-05-21 PROCEDURE — 25010000002 ONDANSETRON PER 1 MG

## 2025-05-21 PROCEDURE — 94664 DEMO&/EVAL PT USE INHALER: CPT

## 2025-05-21 PROCEDURE — 81025 URINE PREGNANCY TEST: CPT | Performed by: SURGERY

## 2025-05-21 PROCEDURE — 34310000005 TECHETIUM TC99M TILMANOCEPT: Performed by: SURGERY

## 2025-05-21 PROCEDURE — 38792 RA TRACER ID OF SENTINL NODE: CPT

## 2025-05-21 PROCEDURE — G0378 HOSPITAL OBSERVATION PER HR: HCPCS

## 2025-05-21 PROCEDURE — 84132 ASSAY OF SERUM POTASSIUM: CPT | Performed by: SURGERY

## 2025-05-21 PROCEDURE — 25010000002 HYDRALAZINE PER 20 MG

## 2025-05-21 PROCEDURE — 88307 TISSUE EXAM BY PATHOLOGIST: CPT | Performed by: SURGERY

## 2025-05-21 PROCEDURE — 25010000002 SUGAMMADEX 200 MG/2ML SOLUTION

## 2025-05-21 PROCEDURE — 25010000002 DEXAMETHASONE PER 1 MG

## 2025-05-21 PROCEDURE — 25010000002 FENTANYL CITRATE (PF) 100 MCG/2ML SOLUTION

## 2025-05-21 PROCEDURE — 25010000002 FENTANYL CITRATE (PF) 50 MCG/ML SOLUTION: Performed by: ANESTHESIOLOGY

## 2025-05-21 PROCEDURE — 25010000003 LABETALOL 5 MG/ML SOLUTION

## 2025-05-21 PROCEDURE — 25010000002 CEFAZOLIN PER 500 MG

## 2025-05-21 DEVICE — LIGACLIP MCA MULTIPLE CLIP APPLIERS, 20 MEDIUM CLIPS
Type: IMPLANTABLE DEVICE | Site: BREAST | Status: FUNCTIONAL
Brand: LIGACLIP

## 2025-05-21 DEVICE — ALLOGRFT DERM CORTIVA/FINESSE 16X20CM: Type: IMPLANTABLE DEVICE | Site: BREAST | Status: FUNCTIONAL

## 2025-05-21 DEVICE — EXPNDR BRST ALLOX2 F/HT TXT SMOTH 480TO575CC: Type: IMPLANTABLE DEVICE | Site: BREAST | Status: FUNCTIONAL

## 2025-05-21 RX ORDER — LABETALOL HYDROCHLORIDE 5 MG/ML
INJECTION, SOLUTION INTRAVENOUS AS NEEDED
Status: DISCONTINUED | OUTPATIENT
Start: 2025-05-21 | End: 2025-05-21 | Stop reason: SURG

## 2025-05-21 RX ORDER — BUPIVACAINE HYDROCHLORIDE 5 MG/ML
INJECTION, SOLUTION EPIDURAL; INTRACAUDAL; PERINEURAL
Status: COMPLETED | OUTPATIENT
Start: 2025-05-21 | End: 2025-05-21

## 2025-05-21 RX ORDER — BUPROPION HYDROCHLORIDE 150 MG/1
150 TABLET ORAL DAILY
Status: DISCONTINUED | OUTPATIENT
Start: 2025-05-22 | End: 2025-05-22 | Stop reason: HOSPADM

## 2025-05-21 RX ORDER — ALBUTEROL SULFATE 0.83 MG/ML
2.5 SOLUTION RESPIRATORY (INHALATION) EVERY 6 HOURS PRN
Status: DISCONTINUED | OUTPATIENT
Start: 2025-05-21 | End: 2025-05-22 | Stop reason: HOSPADM

## 2025-05-21 RX ORDER — SODIUM CHLORIDE, SODIUM LACTATE, POTASSIUM CHLORIDE, CALCIUM CHLORIDE 600; 310; 30; 20 MG/100ML; MG/100ML; MG/100ML; MG/100ML
1000 INJECTION, SOLUTION INTRAVENOUS ONCE
Status: COMPLETED | OUTPATIENT
Start: 2025-05-21 | End: 2025-05-21

## 2025-05-21 RX ORDER — VILAZODONE HYDROCHLORIDE 10 MG/1
20 TABLET ORAL DAILY
Status: DISCONTINUED | OUTPATIENT
Start: 2025-05-22 | End: 2025-05-22 | Stop reason: HOSPADM

## 2025-05-21 RX ORDER — PROMETHAZINE HYDROCHLORIDE 25 MG/1
25 SUPPOSITORY RECTAL ONCE AS NEEDED
Status: DISCONTINUED | OUTPATIENT
Start: 2025-05-21 | End: 2025-05-21 | Stop reason: HOSPADM

## 2025-05-21 RX ORDER — LIDOCAINE HYDROCHLORIDE 20 MG/ML
INJECTION, SOLUTION EPIDURAL; INFILTRATION; INTRACAUDAL; PERINEURAL AS NEEDED
Status: DISCONTINUED | OUTPATIENT
Start: 2025-05-21 | End: 2025-05-21 | Stop reason: SURG

## 2025-05-21 RX ORDER — FENTANYL CITRATE 50 UG/ML
INJECTION, SOLUTION INTRAMUSCULAR; INTRAVENOUS
Status: COMPLETED | OUTPATIENT
Start: 2025-05-21 | End: 2025-05-21

## 2025-05-21 RX ORDER — NALOXONE HCL 0.4 MG/ML
0.2 VIAL (ML) INJECTION AS NEEDED
Status: DISCONTINUED | OUTPATIENT
Start: 2025-05-21 | End: 2025-05-21 | Stop reason: HOSPADM

## 2025-05-21 RX ORDER — BISACODYL 5 MG/1
5 TABLET, DELAYED RELEASE ORAL DAILY PRN
Status: DISCONTINUED | OUTPATIENT
Start: 2025-05-21 | End: 2025-05-22 | Stop reason: HOSPADM

## 2025-05-21 RX ORDER — LABETALOL HYDROCHLORIDE 5 MG/ML
5 INJECTION, SOLUTION INTRAVENOUS
Status: DISCONTINUED | OUTPATIENT
Start: 2025-05-21 | End: 2025-05-21 | Stop reason: HOSPADM

## 2025-05-21 RX ORDER — BISACODYL 10 MG
10 SUPPOSITORY, RECTAL RECTAL DAILY PRN
Status: DISCONTINUED | OUTPATIENT
Start: 2025-05-21 | End: 2025-05-22 | Stop reason: HOSPADM

## 2025-05-21 RX ORDER — SCOPOLAMINE 1 MG/3D
1 PATCH, EXTENDED RELEASE TRANSDERMAL
Status: DISCONTINUED | OUTPATIENT
Start: 2025-05-21 | End: 2025-05-21 | Stop reason: HOSPADM

## 2025-05-21 RX ORDER — ONDANSETRON 2 MG/ML
INJECTION INTRAMUSCULAR; INTRAVENOUS AS NEEDED
Status: DISCONTINUED | OUTPATIENT
Start: 2025-05-21 | End: 2025-05-21 | Stop reason: SURG

## 2025-05-21 RX ORDER — GABAPENTIN 300 MG/1
300 CAPSULE ORAL NIGHTLY
Status: DISCONTINUED | OUTPATIENT
Start: 2025-05-21 | End: 2025-05-22 | Stop reason: HOSPADM

## 2025-05-21 RX ORDER — CALCIUM CARBONATE 500 MG/1
1 TABLET, CHEWABLE ORAL 2 TIMES DAILY PRN
Status: DISCONTINUED | OUTPATIENT
Start: 2025-05-21 | End: 2025-05-22 | Stop reason: HOSPADM

## 2025-05-21 RX ORDER — SODIUM CHLORIDE, SODIUM LACTATE, POTASSIUM CHLORIDE, CALCIUM CHLORIDE 600; 310; 30; 20 MG/100ML; MG/100ML; MG/100ML; MG/100ML
INJECTION, SOLUTION INTRAVENOUS CONTINUOUS PRN
Status: DISCONTINUED | OUTPATIENT
Start: 2025-05-21 | End: 2025-05-21 | Stop reason: SURG

## 2025-05-21 RX ORDER — SODIUM CHLORIDE 0.9 % (FLUSH) 0.9 %
10 SYRINGE (ML) INJECTION AS NEEDED
Status: DISCONTINUED | OUTPATIENT
Start: 2025-05-21 | End: 2025-05-22 | Stop reason: HOSPADM

## 2025-05-21 RX ORDER — BUPIVACAINE HYDROCHLORIDE 2.5 MG/ML
INJECTION, SOLUTION EPIDURAL; INFILTRATION; INTRACAUDAL; PERINEURAL AS NEEDED
Status: DISCONTINUED | OUTPATIENT
Start: 2025-05-21 | End: 2025-05-21 | Stop reason: HOSPADM

## 2025-05-21 RX ORDER — DROPERIDOL 2.5 MG/ML
0.62 INJECTION, SOLUTION INTRAMUSCULAR; INTRAVENOUS
Status: DISCONTINUED | OUTPATIENT
Start: 2025-05-21 | End: 2025-05-21 | Stop reason: HOSPADM

## 2025-05-21 RX ORDER — PROPOFOL 10 MG/ML
VIAL (ML) INTRAVENOUS AS NEEDED
Status: DISCONTINUED | OUTPATIENT
Start: 2025-05-21 | End: 2025-05-21 | Stop reason: SURG

## 2025-05-21 RX ORDER — OXYCODONE HYDROCHLORIDE 5 MG/1
5 TABLET ORAL EVERY 4 HOURS PRN
Refills: 0 | Status: DISCONTINUED | OUTPATIENT
Start: 2025-05-21 | End: 2025-05-22 | Stop reason: HOSPADM

## 2025-05-21 RX ORDER — ONDANSETRON 2 MG/ML
4 INJECTION INTRAMUSCULAR; INTRAVENOUS ONCE AS NEEDED
Status: COMPLETED | OUTPATIENT
Start: 2025-05-21 | End: 2025-05-21

## 2025-05-21 RX ORDER — HYDRALAZINE HYDROCHLORIDE 20 MG/ML
INJECTION INTRAMUSCULAR; INTRAVENOUS AS NEEDED
Status: DISCONTINUED | OUTPATIENT
Start: 2025-05-21 | End: 2025-05-21 | Stop reason: SURG

## 2025-05-21 RX ORDER — ONDANSETRON 2 MG/ML
4 INJECTION INTRAMUSCULAR; INTRAVENOUS EVERY 6 HOURS PRN
Status: DISCONTINUED | OUTPATIENT
Start: 2025-05-21 | End: 2025-05-22 | Stop reason: HOSPADM

## 2025-05-21 RX ORDER — DIPHENHYDRAMINE HYDROCHLORIDE 50 MG/ML
12.5 INJECTION, SOLUTION INTRAMUSCULAR; INTRAVENOUS
Status: DISCONTINUED | OUTPATIENT
Start: 2025-05-21 | End: 2025-05-21 | Stop reason: HOSPADM

## 2025-05-21 RX ORDER — MIDAZOLAM HYDROCHLORIDE 1 MG/ML
INJECTION, SOLUTION INTRAMUSCULAR; INTRAVENOUS
Status: COMPLETED | OUTPATIENT
Start: 2025-05-21 | End: 2025-05-21

## 2025-05-21 RX ORDER — AMOXICILLIN 250 MG
2 CAPSULE ORAL 2 TIMES DAILY
Status: DISCONTINUED | OUTPATIENT
Start: 2025-05-21 | End: 2025-05-22 | Stop reason: HOSPADM

## 2025-05-21 RX ORDER — DEXAMETHASONE SODIUM PHOSPHATE 4 MG/ML
INJECTION, SOLUTION INTRA-ARTICULAR; INTRALESIONAL; INTRAMUSCULAR; INTRAVENOUS; SOFT TISSUE AS NEEDED
Status: DISCONTINUED | OUTPATIENT
Start: 2025-05-21 | End: 2025-05-21 | Stop reason: SURG

## 2025-05-21 RX ORDER — HYDROMORPHONE HYDROCHLORIDE 1 MG/ML
0.5 INJECTION, SOLUTION INTRAMUSCULAR; INTRAVENOUS; SUBCUTANEOUS
Status: DISCONTINUED | OUTPATIENT
Start: 2025-05-21 | End: 2025-05-21 | Stop reason: HOSPADM

## 2025-05-21 RX ORDER — ACETAMINOPHEN 500 MG
1000 TABLET ORAL EVERY 6 HOURS SCHEDULED
Status: DISCONTINUED | OUTPATIENT
Start: 2025-05-21 | End: 2025-05-22 | Stop reason: HOSPADM

## 2025-05-21 RX ORDER — PANTOPRAZOLE SODIUM 40 MG/1
40 TABLET, DELAYED RELEASE ORAL 2 TIMES DAILY
Status: DISCONTINUED | OUTPATIENT
Start: 2025-05-21 | End: 2025-05-22 | Stop reason: HOSPADM

## 2025-05-21 RX ORDER — HYDROCODONE BITARTRATE AND ACETAMINOPHEN 5; 325 MG/1; MG/1
1 TABLET ORAL ONCE AS NEEDED
Status: COMPLETED | OUTPATIENT
Start: 2025-05-21 | End: 2025-05-21

## 2025-05-21 RX ORDER — SODIUM CHLORIDE 0.9 % (FLUSH) 0.9 %
10 SYRINGE (ML) INJECTION AS NEEDED
Status: DISCONTINUED | OUTPATIENT
Start: 2025-05-21 | End: 2025-05-21 | Stop reason: HOSPADM

## 2025-05-21 RX ORDER — FENTANYL CITRATE 50 UG/ML
50 INJECTION, SOLUTION INTRAMUSCULAR; INTRAVENOUS
Status: DISCONTINUED | OUTPATIENT
Start: 2025-05-21 | End: 2025-05-21 | Stop reason: HOSPADM

## 2025-05-21 RX ORDER — PROMETHAZINE HYDROCHLORIDE 25 MG/1
25 TABLET ORAL ONCE AS NEEDED
Status: DISCONTINUED | OUTPATIENT
Start: 2025-05-21 | End: 2025-05-21 | Stop reason: HOSPADM

## 2025-05-21 RX ORDER — IPRATROPIUM BROMIDE AND ALBUTEROL SULFATE 2.5; .5 MG/3ML; MG/3ML
3 SOLUTION RESPIRATORY (INHALATION) ONCE AS NEEDED
Status: COMPLETED | OUTPATIENT
Start: 2025-05-21 | End: 2025-05-21

## 2025-05-21 RX ORDER — POLYETHYLENE GLYCOL 3350 17 G/17G
17 POWDER, FOR SOLUTION ORAL DAILY PRN
Status: DISCONTINUED | OUTPATIENT
Start: 2025-05-21 | End: 2025-05-22 | Stop reason: HOSPADM

## 2025-05-21 RX ORDER — FENTANYL CITRATE 50 UG/ML
INJECTION, SOLUTION INTRAMUSCULAR; INTRAVENOUS AS NEEDED
Status: DISCONTINUED | OUTPATIENT
Start: 2025-05-21 | End: 2025-05-21 | Stop reason: SURG

## 2025-05-21 RX ORDER — FLUMAZENIL 0.1 MG/ML
0.2 INJECTION INTRAVENOUS AS NEEDED
Status: DISCONTINUED | OUTPATIENT
Start: 2025-05-21 | End: 2025-05-21 | Stop reason: HOSPADM

## 2025-05-21 RX ORDER — HYDRALAZINE HYDROCHLORIDE 20 MG/ML
5 INJECTION INTRAMUSCULAR; INTRAVENOUS
Status: DISCONTINUED | OUTPATIENT
Start: 2025-05-21 | End: 2025-05-21 | Stop reason: HOSPADM

## 2025-05-21 RX ORDER — ROCURONIUM BROMIDE 10 MG/ML
INJECTION, SOLUTION INTRAVENOUS AS NEEDED
Status: DISCONTINUED | OUTPATIENT
Start: 2025-05-21 | End: 2025-05-21 | Stop reason: SURG

## 2025-05-21 RX ORDER — HEPARIN SODIUM 5000 [USP'U]/ML
5000 INJECTION, SOLUTION INTRAVENOUS; SUBCUTANEOUS ONCE
Status: COMPLETED | OUTPATIENT
Start: 2025-05-21 | End: 2025-05-21

## 2025-05-21 RX ORDER — SODIUM CHLORIDE 0.9 % (FLUSH) 0.9 %
10 SYRINGE (ML) INJECTION EVERY 12 HOURS SCHEDULED
Status: DISCONTINUED | OUTPATIENT
Start: 2025-05-21 | End: 2025-05-22 | Stop reason: HOSPADM

## 2025-05-21 RX ORDER — DEXMEDETOMIDINE HYDROCHLORIDE 100 UG/ML
INJECTION, SOLUTION INTRAVENOUS AS NEEDED
Status: DISCONTINUED | OUTPATIENT
Start: 2025-05-21 | End: 2025-05-21 | Stop reason: SURG

## 2025-05-21 RX ORDER — LIDOCAINE HYDROCHLORIDE 10 MG/ML
0.5 INJECTION, SOLUTION EPIDURAL; INFILTRATION; INTRACAUDAL; PERINEURAL ONCE AS NEEDED
Status: DISCONTINUED | OUTPATIENT
Start: 2025-05-21 | End: 2025-05-21 | Stop reason: HOSPADM

## 2025-05-21 RX ORDER — MONTELUKAST SODIUM 10 MG/1
10 TABLET ORAL NIGHTLY
Status: DISCONTINUED | OUTPATIENT
Start: 2025-05-21 | End: 2025-05-22 | Stop reason: HOSPADM

## 2025-05-21 RX ADMIN — PROPOFOL 50 MG: 10 INJECTION, EMULSION INTRAVENOUS at 09:23

## 2025-05-21 RX ADMIN — FENTANYL CITRATE 50 MCG: 50 INJECTION, SOLUTION INTRAMUSCULAR; INTRAVENOUS at 08:15

## 2025-05-21 RX ADMIN — ROCURONIUM BROMIDE 20 MG: 10 INJECTION, SOLUTION INTRAVENOUS at 09:00

## 2025-05-21 RX ADMIN — HYDROCODONE BITARTRATE AND ACETAMINOPHEN 1 TABLET: 5; 325 TABLET ORAL at 14:13

## 2025-05-21 RX ADMIN — BUPIVACAINE 10 ML: 13.3 INJECTION, SUSPENSION, LIPOSOMAL INFILTRATION at 07:11

## 2025-05-21 RX ADMIN — BUPIVACAINE 10 ML: 13.3 INJECTION, SUSPENSION, LIPOSOMAL INFILTRATION at 07:08

## 2025-05-21 RX ADMIN — PROPOFOL 50 MG: 10 INJECTION, EMULSION INTRAVENOUS at 08:15

## 2025-05-21 RX ADMIN — LABETALOL 20 MG/4 ML (5 MG/ML) INTRAVENOUS SYRINGE 5 MG: at 10:48

## 2025-05-21 RX ADMIN — CEFAZOLIN 2000 MG: 2 INJECTION, POWDER, FOR SOLUTION INTRAMUSCULAR; INTRAVENOUS at 07:34

## 2025-05-21 RX ADMIN — SODIUM CHLORIDE, SODIUM LACTATE, POTASSIUM CHLORIDE, AND CALCIUM CHLORIDE: .6; .31; .03; .02 INJECTION, SOLUTION INTRAVENOUS at 07:41

## 2025-05-21 RX ADMIN — DEXMEDETOMIDINE HYDROCHLORIDE 4 MCG: 100 INJECTION, SOLUTION INTRAVENOUS at 08:07

## 2025-05-21 RX ADMIN — OXYCODONE 5 MG: 5 TABLET ORAL at 22:39

## 2025-05-21 RX ADMIN — BUPIVACAINE HYDROCHLORIDE 10 ML: 5 INJECTION, SOLUTION EPIDURAL; INTRACAUDAL; PERINEURAL at 07:08

## 2025-05-21 RX ADMIN — PROPOFOL 180 MCG/KG/MIN: 10 INJECTION, EMULSION INTRAVENOUS at 08:00

## 2025-05-21 RX ADMIN — FENTANYL CITRATE 50 MCG: 50 INJECTION, SOLUTION INTRAMUSCULAR; INTRAVENOUS at 09:23

## 2025-05-21 RX ADMIN — ROCURONIUM BROMIDE 20 MG: 10 INJECTION, SOLUTION INTRAVENOUS at 10:55

## 2025-05-21 RX ADMIN — OXYCODONE 5 MG: 5 TABLET ORAL at 17:54

## 2025-05-21 RX ADMIN — BUPIVACAINE HYDROCHLORIDE 10 ML: 5 INJECTION, SOLUTION EPIDURAL; INTRACAUDAL; PERINEURAL at 07:11

## 2025-05-21 RX ADMIN — DEXMEDETOMIDINE HYDROCHLORIDE 4 MCG: 100 INJECTION, SOLUTION INTRAVENOUS at 08:02

## 2025-05-21 RX ADMIN — CEFAZOLIN 2 G: 2 INJECTION, POWDER, FOR SOLUTION INTRAMUSCULAR; INTRAVENOUS at 11:34

## 2025-05-21 RX ADMIN — Medication 10 ML: at 21:00

## 2025-05-21 RX ADMIN — HEPARIN SODIUM 5000 UNITS: 5000 INJECTION, SOLUTION INTRAVENOUS; SUBCUTANEOUS at 06:45

## 2025-05-21 RX ADMIN — FENTANYL CITRATE 50 MCG: 50 INJECTION, SOLUTION INTRAMUSCULAR; INTRAVENOUS at 09:04

## 2025-05-21 RX ADMIN — FENTANYL CITRATE 50 MCG: 50 INJECTION, SOLUTION INTRAMUSCULAR; INTRAVENOUS at 07:50

## 2025-05-21 RX ADMIN — ROCURONIUM BROMIDE 50 MG: 10 INJECTION, SOLUTION INTRAVENOUS at 07:50

## 2025-05-21 RX ADMIN — IBUPROFEN 600 MG: 200 TABLET, FILM COATED ORAL at 21:08

## 2025-05-21 RX ADMIN — ROCURONIUM BROMIDE 10 MG: 10 INJECTION, SOLUTION INTRAVENOUS at 09:46

## 2025-05-21 RX ADMIN — HYDRALAZINE HYDROCHLORIDE 5 MG: 20 INJECTION INTRAMUSCULAR; INTRAVENOUS at 09:38

## 2025-05-21 RX ADMIN — PANTOPRAZOLE SODIUM 40 MG: 40 TABLET, DELAYED RELEASE ORAL at 22:30

## 2025-05-21 RX ADMIN — ACETAMINOPHEN 1000 MG: 500 TABLET ORAL at 17:28

## 2025-05-21 RX ADMIN — HYDRALAZINE HYDROCHLORIDE 5 MG: 20 INJECTION INTRAMUSCULAR; INTRAVENOUS at 09:45

## 2025-05-21 RX ADMIN — LIDOCAINE HYDROCHLORIDE 100 MG: 20 INJECTION, SOLUTION EPIDURAL; INFILTRATION; INTRACAUDAL; PERINEURAL at 07:50

## 2025-05-21 RX ADMIN — GABAPENTIN 300 MG: 300 CAPSULE ORAL at 22:30

## 2025-05-21 RX ADMIN — TILMANOCEPT 1 DOSE: KIT at 06:30

## 2025-05-21 RX ADMIN — SUGAMMADEX 300 MG: 100 INJECTION, SOLUTION INTRAVENOUS at 12:09

## 2025-05-21 RX ADMIN — DEXMEDETOMIDINE HYDROCHLORIDE 4 MCG: 100 INJECTION, SOLUTION INTRAVENOUS at 08:48

## 2025-05-21 RX ADMIN — HYDROMORPHONE HYDROCHLORIDE 0.25 MG: 1 INJECTION, SOLUTION INTRAMUSCULAR; INTRAVENOUS; SUBCUTANEOUS at 10:37

## 2025-05-21 RX ADMIN — ROCURONIUM BROMIDE 10 MG: 10 INJECTION, SOLUTION INTRAVENOUS at 09:23

## 2025-05-21 RX ADMIN — IPRATROPIUM BROMIDE AND ALBUTEROL SULFATE 3 ML: .5; 3 SOLUTION RESPIRATORY (INHALATION) at 13:24

## 2025-05-21 RX ADMIN — ONDANSETRON 4 MG: 2 INJECTION, SOLUTION INTRAMUSCULAR; INTRAVENOUS at 12:03

## 2025-05-21 RX ADMIN — DEXMEDETOMIDINE HYDROCHLORIDE 4 MCG: 100 INJECTION, SOLUTION INTRAVENOUS at 09:16

## 2025-05-21 RX ADMIN — HYDROMORPHONE HYDROCHLORIDE 0.25 MG: 1 INJECTION, SOLUTION INTRAMUSCULAR; INTRAVENOUS; SUBCUTANEOUS at 11:39

## 2025-05-21 RX ADMIN — HYDROMORPHONE HYDROCHLORIDE 0.25 MG: 1 INJECTION, SOLUTION INTRAMUSCULAR; INTRAVENOUS; SUBCUTANEOUS at 10:11

## 2025-05-21 RX ADMIN — MONTELUKAST 10 MG: 10 TABLET, FILM COATED ORAL at 22:30

## 2025-05-21 RX ADMIN — SCOPALAMINE 1 PATCH: 1 PATCH, EXTENDED RELEASE TRANSDERMAL at 06:56

## 2025-05-21 RX ADMIN — SODIUM CHLORIDE, POTASSIUM CHLORIDE, SODIUM LACTATE AND CALCIUM CHLORIDE 1000 ML: 600; 310; 30; 20 INJECTION, SOLUTION INTRAVENOUS at 06:39

## 2025-05-21 RX ADMIN — DEXAMETHASONE SODIUM PHOSPHATE 8 MG: 4 INJECTION, SOLUTION INTRAMUSCULAR; INTRAVENOUS at 08:06

## 2025-05-21 RX ADMIN — HYDROMORPHONE HYDROCHLORIDE 0.25 MG: 1 INJECTION, SOLUTION INTRAMUSCULAR; INTRAVENOUS; SUBCUTANEOUS at 11:54

## 2025-05-21 RX ADMIN — DEXMEDETOMIDINE HYDROCHLORIDE 4 MCG: 100 INJECTION, SOLUTION INTRAVENOUS at 11:18

## 2025-05-21 RX ADMIN — PROPOFOL 200 MG: 10 INJECTION, EMULSION INTRAVENOUS at 07:50

## 2025-05-21 RX ADMIN — PROPOFOL 50 MG: 10 INJECTION, EMULSION INTRAVENOUS at 09:00

## 2025-05-21 RX ADMIN — LABETALOL 20 MG/4 ML (5 MG/ML) INTRAVENOUS SYRINGE 5 MG: at 10:27

## 2025-05-21 RX ADMIN — ROCURONIUM BROMIDE 10 MG: 10 INJECTION, SOLUTION INTRAVENOUS at 10:31

## 2025-05-21 RX ADMIN — ONDANSETRON 4 MG: 2 INJECTION, SOLUTION INTRAMUSCULAR; INTRAVENOUS at 14:13

## 2025-05-21 RX ADMIN — FENTANYL CITRATE 100 MCG: 50 INJECTION, SOLUTION INTRAMUSCULAR; INTRAVENOUS at 07:05

## 2025-05-21 RX ADMIN — MIDAZOLAM 2 MG: 1 INJECTION INTRAMUSCULAR; INTRAVENOUS at 07:05

## 2025-05-21 RX ADMIN — LABETALOL 20 MG/4 ML (5 MG/ML) INTRAVENOUS SYRINGE 5 MG: at 10:20

## 2025-05-21 NOTE — ANESTHESIA POSTPROCEDURE EVALUATION
Patient: Mary Ann Fletcher    Procedure Summary       Date: 05/21/25 Room / Location: [facility] OR 06 / [facility] MAIN OR    Anesthesia Start: 0741 Anesthesia Stop: 1240    Procedures:       Bilateral skin sparing mastectomy with left sentinel lymph node biopsy followed by immediate tissue expander reconstruction (Bilateral)      BILATERAL IMMEDIATE BREAST RECONSTRUCTION, BREAST TISSUE EXPANDER INSERTION AND CORTIVA BIOLOGIC (Bilateral: Breast) Diagnosis:       Breast cancer, stage 1, estrogen receptor positive, left      Personal history of breast cancer      (Breast cancer, stage 1, estrogen receptor positive, left [C50.912, Z17.0])    Surgeons: Gonzalez Vanessa MD; Micki Peoples MD Provider: Guicho Humphrey MD    Anesthesia Type: general with block ASA Status: 3            Anesthesia Type: general with block    Vitals  Vitals Value Taken Time   /70 05/21/25 13:10   Temp 97.7 °F (36.5 °C) 05/21/25 12:32   Pulse 93 05/21/25 13:12   Resp 18 05/21/25 13:02   SpO2 91 % 05/21/25 13:12   Vitals shown include unfiled device data.        Post Anesthesia Care and Evaluation    Patient location during evaluation: PACU  Patient participation: complete - patient participated  Level of consciousness: awake  Pain scale: See nurse's notes for pain score.  Pain management: adequate    Airway patency: patent  Anesthetic complications: No anesthetic complications  PONV Status: none  Cardiovascular status: acceptable  Respiratory status: acceptable and spontaneous ventilation  Hydration status: acceptable    Comments: Patient seen and examined postoperatively; vital signs stable; SpO2 greater than or equal to 90%; cardiopulmonary status stable; nausea/vomiting adequately controlled; pain adequately controlled; no apparent anesthesia complications; patient discharged from anesthesia care when discharge criteria were met

## 2025-05-21 NOTE — PLAN OF CARE
Goal Outcome Evaluation:                                          New admission to [facility], stable. Able to make needs known, plan of care ongoing.

## 2025-05-21 NOTE — ANESTHESIA PROCEDURE NOTES
Peripheral Block      Patient reassessed immediately prior to procedure    Patient location during procedure: pre-op  Reason for block: at surgeon's request and post-op pain management  Performed by  Anesthesiologist: Guicho Humphrey MD  Preanesthetic Checklist  Completed: patient identified, IV checked, site marked, risks and benefits discussed, surgical consent, monitors and equipment checked, pre-op evaluation and timeout performed  Prep:  Pt Position: supine  Sterile barriers:cap, gloves, sterile barriers and mask  Prep: ChloraPrep  Patient monitoring: blood pressure monitoring, continuous pulse oximetry and EKG  Procedure    Sedation: yes  Performed under: MAC  Guidance:ultrasound guided    ULTRASOUND INTERPRETATION.  Using ultrasound guidance a 20 G gauge needle was placed in close proximity to the femoral nerve, at which point, under ultrasound guidance anesthetic was injected in the area of the nerve and spread of the anesthesia was seen on ultrasound in close proximity thereto.  There were no abnormalities seen on ultrasound; a digital image was taken; and the patient tolerated the procedure with no complications. Images:still images obtained, printed/placed on chart    Laterality:Bilateral  Block Type:erector spinae block  Injection Technique:single-shot  Needle Type:echogenic  Needle Gauge:20 G  Resistance on Injection: none  Sedation medications used: midazolam (VERSED) injection - Intravenous   2 mg - 5/21/2025 7:05:00 AM  fentaNYL citrate (PF) (SUBLIMAZE) injection - Intravenous   100 mcg - 5/21/2025 7:05:00 AM  Medications Used: bupivacaine liposome (EXPAREL) injection 1.3% - Perineural, Erector Spinae   10 mL - 5/21/2025 7:08:00 AM   10 mL - 5/21/2025 7:11:00 AM  bupivacaine PF (MARCAINE) injection 0.5% - Perineural, Erector Spinae   10 mL - 5/21/2025 7:08:00 AM   10 mL - 5/21/2025 7:11:00 AM      Medications  Comment:First injection R side. Second injection L side.    Post  Assessment  Injection Assessment: negative aspiration for heme, no paresthesia on injection and incremental injection  Patient Tolerance:comfortable throughout block  Complications:no  Performed by: Guicho Humphrey MD

## 2025-05-21 NOTE — ANESTHESIA PREPROCEDURE EVALUATION
Anesthesia Evaluation     Patient summary reviewed and Nursing notes reviewed   history of anesthetic complications:  PONV  NPO Solid Status: > 8 hours  NPO Liquid Status: > 8 hours           Airway   Mallampati: I  TM distance: >3 FB  Neck ROM: full  No difficulty expected  Dental - normal exam     Pulmonary - normal exam   (+) a smoker Former,  Cardiovascular - normal exam      ROS comment: Narrative  ·  Left ventricular systolic function is normal. Calculated left  ventricular EF = 60%. GLS -17.9%.  ·  Left ventricular diastolic function was normal.  ·  Estimated right ventricular systolic pressure from tricuspid  regurgitation is normal (<35 mmHg).      Neuro/Psych  (+) dizziness/light headedness, psychiatric history Anxiety and Depression  GI/Hepatic/Renal/Endo    (+) obesity, morbid obesity, liver disease    ROS Comment: 45 y.o. female with breast cancer currently receiving chemotherapy who presents with a 3-day history of nausea and vomiting.  She was also noted to have a right labial/groin abscess.  She reports she started having significant pain in the right groin on 2/9/2025.  Progressive worsening however over the last day has somewhat improved since it is started draining some.    Musculoskeletal     Abdominal  - normal exam    Bowel sounds: normal.   Substance History      OB/GYN          Other   arthritis,   history of cancer    ROS/Med Hx Other: PORT PLACEMENT 11/2024 IGEL4  NAUSEA/VOMITING ON ADMISSION HAS SINCE RESOLVED. EATING NORMALLY YESTERDAY WITHOUT NV.   TREATMENT RELATED DIARRHEA. HYPOKALEMIA. IMPROVING WITH SUPPLEMENTATION                  Anesthesia Plan    ASA 3     general with block   total IV anesthesia  intravenous induction     Anesthetic plan, risks, benefits, and alternatives have been provided, discussed and informed consent has been obtained with: patient.  Pre-procedure education provided  Plan discussed with CRNA.      CODE STATUS:

## 2025-05-21 NOTE — BRIEF OP NOTE
BREAST RECONSTRUCTION, BREAST TISSUE EXPANDER INSERTION  Progress Note    Mary Ann Fletcher  5/21/2025    Pre-op Diagnosis:   Breast cancer, stage 1, estrogen receptor positive, left [C50.912, Z17.0]       Post-Op Diagnosis Codes:     * Breast cancer, stage 1, estrogen receptor positive, left [C50.912, Z17.0]     * Personal history of breast cancer [Z85.3]    Procedure(s):      Procedure(s):  Bilateral skin sparing mastectomy with left sentinel lymph node biopsy followed by immediate tissue expander reconstruction  BILATERAL IMMEDIATE BREAST RECONSTRUCTION, BREAST TISSUE EXPANDER INSERTION AND CORTIVA BIOLOGIC    Surgeon(s):  Gonzalez Vanessa MD Palazzo, Michelle D, MD    Anesthesia: General with Block    Staff:   Circulator: Zaria Tellez RN; Garrick Rodriguez RN; William Mora RN  Scrub Person: Joycelyn Chandra; Marisa Roth RN  Assistant: Dayan Gonsales RNFA  Other: Mallorie Cronin RN  Assistant: Dayan Gonsales RNFA    Estimated Blood Loss: 200 mL    Urine Voided: * No values recorded between 5/21/2025  7:40 AM and 5/21/2025 12:30 PM *    Specimens:                Specimens       ID Source Type Tests Collected By Collected At Frozen?    A Breast, Left Tissue TISSUE PATHOLOGY EXAM   Gonzalez Vanessa MD 5/21/25 0930 No    Description: left breast additional inferior medial    Comment: Clip jojo true margin    B Breast, Left Tissue TISSUE PATHOLOGY EXAM   Gonzalez Vanessa MD 5/21/25 1003 No    Description: sentinel node    C Breast, Right Tissue TISSUE PATHOLOGY EXAM   Gonzalez Vanessa MD 5/21/25 1126 No    Comment: Long stitch-lateral  Short stitch-superior    940grms    D Breast, Left Tissue TISSUE PATHOLOGY EXAM   Gonzalez Vanessa MD 5/21/25 1126 No    Comment: Long stitch-lateral-short   Short stitch-superior    948 grms              Drains:   Closed/Suction Drain 2 Left;Inferior Breast Bulb 15 Fr. (Active)   Site Description Unable to view 05/21/25 1800   Dressing Status  Clean;Dry;Intact 05/21/25 1800   Drainage Appearance Bloody 05/21/25 1800   Status To bulb suction 05/21/25 1800   Output (mL) 40 mL 05/21/25 1550       Closed/Suction Drain 2 Inferior;Right Breast Bulb 15 Fr. (Active)   Site Description Unable to view 05/21/25 1800   Dressing Status Clean;Intact;Dry 05/21/25 1800   Drainage Appearance Bloody 05/21/25 1800   Status To bulb suction 05/21/25 1800   Output (mL) 30 mL 05/21/25 1550       Closed/Suction Drain Right Chest Bulb 15 Fr. (Active)   Site Description Unable to view 05/21/25 1800   Dressing Status Clean;Dry;Intact 05/21/25 1800   Drainage Appearance Bloody 05/21/25 1800   Status To bulb suction 05/21/25 1800   Output (mL) 15 mL 05/21/25 1550       Closed/Suction Drain 4 Inferior;Left Chest Bulb 15 Fr. (Active)   Site Description Unable to view 05/21/25 1800   Dressing Status Clean;Dry;Intact 05/21/25 1800   Drainage Appearance Bloody 05/21/25 1800   Status To bulb suction 05/21/25 1800   Output (mL) 50 mL 05/21/25 1550       Findings: prepectoral placement of [id number] factory air fill and Cortiva 16x20 cm anterior wrap      Complications: none      Micki Peoples MD     Date: 5/21/2025  Time: 18:06 EDT

## 2025-05-21 NOTE — OP NOTE
Operative Note    5/21/2025    PRE-OP DIAGNOSIS: Breast cancer, stage 1, estrogen receptor positive, left [C50.912, Z17.0]    POST-OP DIAGNOSIS: Post-Op Diagnosis Codes:     * Breast cancer, stage 1, estrogen receptor positive, left [C50.912, Z17.0]     * Personal history of breast cancer [Z85.3]    Procedure(s):  Bilateral skin sparing mastectomy with left sentinel lymph node biopsy followed by immediate tissue expander reconstruction  BILATERAL IMMEDIATE BREAST RECONSTRUCTION, BREAST TISSUE EXPANDER INSERTION AND CORTIVA BIOLOGIC    SURGEON: Surgeons and Role:  Panel 1:     * Gonzalez Vanessa MD - Primary  Panel 2:     * Micki Peoples MD - Primary    ASSISTANT: Assistant: Dayan Gonsales RNFA was responsible for performing the following activities: Retraction, Suction, and Irrigation and their skilled assistance was necessary for the success of this case.     ANESTHESIA: General with Block    IMPLANT(S):   Implant Name Type Inv. Item Serial No.  Lot No. LRB No. Used Action   CLIPAPPLR M/ ENDO LIGACLIP 20CLP 11IN MD - [id number] Implant CLIPAPPLR M/ ENDO LIGACLIP 20CLP 11IN MD  [employer]  DIV [employer] J AND J [id number]  2 Implanted   EXPNDR BRST ALLOX2 F/HT TXT SMOTH [id number] - [id number] - [id number] Implant EXPNDR BRST ALLOX2 F/HT TXT SMOTH [id number] [employer] . Left 1 Implanted   ALLOGRFT DERM CORTIVA/FINESSE [id number] - [id number] - [id number] Implant ALLOGRFT DERM CORTIVA/FINESSE [id number] [employer] [id number] Left 1 Implanted   ALLOGRFT DERM CORTIVA/FINESSE [id number] - [id number] - [id number] Implant ALLOGRFT DERM CORTIVA/FINESSE [id number] [employer] [id number] Right 1 Implanted   EXPNDR BRST ALLOX2 F/HT TXT SMOTH [id number] - [id number] - [id number] Implant EXPNDR BRST ALLOX2 F/HT TXT SMOTH [id number] SIENTRA . Right 1 Implanted       SPECIMEN(S):  Specimens       ID Source Type Tests Collected By  Collected At Frozen?    A Breast, Left Tissue TISSUE PATHOLOGY EXAM   Gonzalez Vanessa MD 5/21/25 0930     Description: left breast additional inferior medial    Comment: Clip jojo true margin    B Breast, Left Tissue TISSUE PATHOLOGY EXAM   Gonzalez Vanessa MD 5/21/25 1003     Description: sentinel node    C Breast, Right Tissue TISSUE PATHOLOGY EXAM   Gonzalez Vanessa MD 5/21/25 1126     Comment: Long stitch-lateral  Short stitch-superior    940grms    D Breast, Left Tissue TISSUE PATHOLOGY EXAM   Gonzalez Vanessa MD 5/21/25 1126     Comment: Long stitch-lateral-short   Short stitch-superior    948 grms            OPERATIVE FINDINGS: Bilateral skin sparing mastectomies with left sentinel lymph node biopsy maximal sentinel lymph node signal of 850 with background signal of 30.     INDICATION FOR PROCEDURE: This patient presents with a cT3 cN0 Mx G3 ER+ (Strong, 100%) OR+ (Strong, 95%) Her2 Positive (2+ IHC, Amplified on FISH), clinical anatomic stage IIB and prognostic stage IB invasive ductal carcinoma of the LEFT breast with DCIS (ER/OR+ G2, solid and cribriform types with comedonecrosis).      She has undergone neoadjuvant targeted chemotherapy  with Taxotere carboplatinum, Herceptin and Perjeta.  Her last dose of trastuzumab/pertuzumab was 4/17/2025.    PROCEDURE IN DETAIL: Mary Ann Fletcher is a 45 y.o. female  has elected to undergo bilateral skin sparing mastectomy with left axillary lymph node assessment. The patient was informed of the possible risks and complications of the procedure, including but not limited to anesthetic risks, bleeding, infection, and need for additional surgery. The patient concurred with the proposed plan, and has given informed consent.  The site of surgery was properly noted/marked in the preoperative holding area.      The patient was brought to the operating room and placed in the supine position with both upper extremities extended. General anesthesia was administered.   Preoperative antibiotics were given.  A time out was performed confirming the patient, site, and procedure.  Lymphazurin blue and technetium were injected in the subareolar plane.  The bilateral chest and axilla was then prepped and draped in the usual sterile fashion.    Beginning at the left breast, an elliptical incision was made encompassing the nipple areolar complex and a lower inner quadrant cyst sharply with a 10 blade scalpel.  It was deepened with electrocautery.  The skin flaps were developed superiorly with electrocautery through the mastectomy plane to the pectoralis muscle at the level of the clavicle.  Skin flaps were then developed inferiorly down to the abdominal wall again with electrocautery in the mastectomy plane.  The breast was then excised off the chest wall taking the pectoralis fascia with it and marked with a long stitch lateral short stitch superior.  It weighed 948 g.    We turned our attention to the left axilla.The gamma probe was used to identify an area of increased radioactivity.  The clavipectoral sheath was incised with electrocautery to reveal the level I axillary lymph nodes. The probe was used to identify a single node with increased radioactivity. This node was brought into the operative field and carefully dissected free of the surrounding lymphovascular structures. Any additional lymph nodes that were either blue or had radioactivity, they were removed and sent to pathology as sentinel lymph nodes.  The maximal signal was 850 with a background of 30 after removal of all hot and blue sentinel lymph nodes.  The wound was irrigated with normal saline and hemostasis was obtained with Bovie electrocautery.  A damp lap was left in the wound.    [place] Node Biopsy for Breast Cancer - Left  Operation performed with curative intent. Yes   Tracer(s) used to identify sentinel nodes in the upfront surgery (non-neoadjuvant) setting (select all that apply). N/A   Tracer(s) used to  identify sentinel nodes in the neoadjuvant setting (select all that apply). Dye and Radioactive tracer   All nodes (colored or non-colored) present at the end of a dye-filled lymphatic channel were removed. Yes   All significantly radioactive nodes were removed. Yes   All palpably suspicious nodes were removed. Yes   Biopsy-proven positive nodes marked with clips prior to chemotherapy were identified and removed. N/A     Dr. Peoples entered the case and began with tissue expander based reconstruction on the left breast.  Please see her note for details.    I then turned my attention to the right breast and made an elliptical incision encompassing the nipple areolar complex sharply with a 10 blade scalpel.  This was deepened with electrocautery.  Skin flaps were developed superiorly with electrocautery through the mastectomy plane to the pectoralis muscle at the level of the clavicle.  Skin flaps were then developed inferiorly down to the abdominal wall again with electrocautery and the mastectomy plane.  The breast was then excised off the chest wall taking the pectoralis fascia with the specimen and it was marked with a long stitch lateral and short stitch superior.  It weighed 940 g.  The wound was irrigated with normal saline and hemostasis was obtained with Bovie electrocautery.  A damp lap was left in the wound.    Dr. Lan then completed tissue cemented based reconstruction of the right breast as well.  Please see her note for details.        ESTIMATED BLOOD LOSS: 30 cc    COMPLICATIONS: none    DISPOSITION: stable PACU

## 2025-05-21 NOTE — ANESTHESIA PROCEDURE NOTES
Airway  Date/Time: 5/21/2025 7:54 AM  Airway not difficult    General Information and Staff    Patient location during procedure: OR  CRNA/CAA: Veda Torres CRNA    Indications and Patient Condition  Indications for airway management: airway protection    Preoxygenated: yes  MILS maintained throughout    Mask difficulty assessment: 1 - vent by mask    Final Airway Details    Final airway type: endotracheal airway      Successful airway: ETT  Cuffed: yes   Successful intubation technique: video laryngoscopy  Adjuncts used in placement: intubating stylet  Endotracheal tube insertion site: oral  Blade: Jaeger  Blade size: 3  ETT size (mm): 7.0  Cormack-Lehane Classification: grade I - full view of glottis  Placement verified by: chest auscultation and capnometry   Cuff volume (mL): 8  Measured from: lips  ETT/EBT  to lips (cm): 20  Number of attempts at approach: 1  Assessment: lips, teeth, and gum same as pre-op and atraumatic intubation

## 2025-05-22 ENCOUNTER — TELEPHONE (OUTPATIENT)
Age: 45
End: 2025-05-22
Payer: COMMERCIAL

## 2025-05-22 VITALS
SYSTOLIC BLOOD PRESSURE: 123 MMHG | TEMPERATURE: 97.6 F | DIASTOLIC BLOOD PRESSURE: 84 MMHG | BODY MASS INDEX: 45.41 KG/M2 | HEIGHT: 63 IN | WEIGHT: 256.3 LBS | OXYGEN SATURATION: 94 % | RESPIRATION RATE: 16 BRPM | HEART RATE: 103 BPM

## 2025-05-22 RX ADMIN — IBUPROFEN 600 MG: 200 TABLET, FILM COATED ORAL at 10:03

## 2025-05-22 RX ADMIN — PANTOPRAZOLE SODIUM 40 MG: 40 TABLET, DELAYED RELEASE ORAL at 08:19

## 2025-05-22 RX ADMIN — SENNOSIDES AND DOCUSATE SODIUM 2 TABLET: 50; 8.6 TABLET ORAL at 08:19

## 2025-05-22 RX ADMIN — OXYCODONE 5 MG: 5 TABLET ORAL at 08:19

## 2025-05-22 RX ADMIN — ACETAMINOPHEN 1000 MG: 500 TABLET ORAL at 05:57

## 2025-05-22 RX ADMIN — BUPROPION HYDROCHLORIDE 150 MG: 150 TABLET, EXTENDED RELEASE ORAL at 08:19

## 2025-05-22 RX ADMIN — ACETAMINOPHEN 500 MG: 500 TABLET ORAL at 11:48

## 2025-05-22 RX ADMIN — Medication 10 ML: at 08:19

## 2025-05-22 RX ADMIN — ACETAMINOPHEN 1000 MG: 500 TABLET ORAL at 00:46

## 2025-05-22 RX ADMIN — VILAZODONE HYDROCHLORIDE 20 MG: 10 TABLET, FILM COATED ORAL at 08:19

## 2025-05-22 NOTE — TELEPHONE ENCOUNTER
PO FU Call- spoke with pt. Already has 2 wk po appt. Will fu then. Knows to call with any questions or concerns.      States doing well.

## 2025-05-22 NOTE — CASE MANAGEMENT/SOCIAL WORK
Discharge Planning Assessment   [place]     Patient Name: Mary Ann Fletcher  MRN: [id number]  Today's Date: 5/22/2025    Admit Date: 5/21/2025    Plan: DC PLAN: Routine home. Walker thru [employer]     Discharge Needs Assessment       Row Name 05/22/25 1041       Living Environment    People in Home sibling(s)    Current Living Arrangements home    Potentially Unsafe Housing Conditions none    In the past 12 months has the electric, gas, oil, or water company threatened to shut off services in your home? No    Primary Care Provided by self    Provides Primary Care For no one    Family Caregiver if Needed spouse    Quality of Family Relationships helpful;involved;supportive    Able to Return to Prior Arrangements yes       Resource/Environmental Concerns    Resource/Environmental Concerns none    Transportation Concerns none       Transportation Needs    In the past 12 months, has lack of transportation kept you from medical appointments or from getting medications? no    In the past 12 months, has lack of transportation kept you from meetings, work, or from getting things needed for daily living? No       Food Insecurity    Within the past 12 months, you worried that your food would run out before you got the money to buy more. Never true    Within the past 12 months, the food you bought just didn't last and you didn't have money to get more. Never true       Transition Planning    Patient/Family Anticipates Transition to home with family    Patient/Family Anticipated Services at Transition none    Transportation Anticipated car, drives self;family or friend will provide       Discharge Needs Assessment    Readmission Within the Last 30 Days no previous admission in last 30 days    Equipment Currently Used at Home none    Anticipated Changes Related to Illness none    Equipment Needed After Discharge none                   Discharge Plan       Row Name 05/22/25 1041       Plan    Plan DC PLAN: Routine home. Walker thru  [facility] [employer]      Patient/Family in Agreement with Plan yes    Plan Comments CM met with patient at bedside, from routine home with sister. Independent with ADL's no DME. Will need rolling walker, order and verbiage requested. DCP report sent to [employer], message sent to liasion. PCP is [facility], Pharmacy is [facility]. Agreeable to use [facility] to [facility]. Able to afford medications and denies any issues with food or utilities. Denies any transportation issues, sister will provide. Denies any HHC or SNF needs. Denies any concerns about return home. Anticipate discharge today.                  Continued Care and Services - Admitted Since 5/21/2025       Durable Medical Equipment       Service Provider Request Status Services Address Phone Fax Patient Preferred    [facility] Accepted -- [address] [address] [phone] --                  Expected Discharge Date and Time       Expected Discharge Date Expected Discharge Time    May 22, 2025            Demographic Summary       Row Name 05/22/25 1041       General Information    Admission Type observation    Arrived From emergency department    Required Notices Provided Observation Status Notice    Referral Source admission list    Reason for Consult discharge planning    Preferred Language English                   Functional Status       Row Name 05/22/25 1041       Functional Status    Usual Activity Tolerance excellent    Current Activity Tolerance excellent       Physical Activity    On average, how many days per week do you engage in moderate to strenuous exercise (like a brisk walk)? 0 days    On average, how many minutes do you engage in exercise at this level? 0 min    Number of minutes of exercise per week 0       Assessment of Health Literacy    How often do you have someone help you read hospital materials? Sometimes    How often do you have problems learning about your medical condition because of difficulty understanding written  information? Sometimes    How often do you have a problem understanding what is told to you about your medical condition? Sometimes    How confident are you filling out medical forms by yourself? Somewhat    Health Literacy Moderate       Functional Status, IADL    Medications independent    Meal Preparation independent    Housekeeping independent    Laundry independent    Shopping independent       Mental Status    General Appearance WDL WDL       Mental Status Summary    Recent Changes in Mental Status/Cognitive Functioning no changes                  Met with patient in room wearing PPE:    Maintained distance greater than six feet and spent less than 15 minutes in the room.    Yanet Escoto RN    Case Management  [phone]

## 2025-05-22 NOTE — DISCHARGE SUMMARY
GENERAL SURGERY DISCHARGE SUMMARY    Date of Discharge:  5/22/2025    Discharge Diagnosis:  cT3 cN0 Mx G3 ER+ (Strong, 100%) UT+ (Strong, 95%) Her2 Positive (2+ IHC, Amplified on FISH), clinical anatomic stage IIB and prognostic stage IB invasive ductal carcinoma of the LEFT breast with DCIS (ER/UT+ G2, solid and cribriform types with comedonecrosis).     Presenting Problem/History of Present Illness  Active Hospital Problems    Diagnosis  POA    **Breast cancer, stage 1, estrogen receptor positive, left [C50.912, Z17.0]  Not Applicable    Breast cancer, stage 1, estrogen receptor positive [C50.919, Z17.0]  Not Applicable    Personal history of breast cancer [Z85.3]  Not Applicable      Resolved Hospital Problems   No resolved problems to display.          Hospital Course  Patient is a 45 y.o. female presented with  a cT3 cN0 Mx G3 ER+ (Strong, 100%) UT+ (Strong, 95%) Her2 Positive (2+ IHC, Amplified on FISH), clinical anatomic stage IIB and prognostic stage IB invasive ductal carcinoma of the LEFT breast with DCIS (ER/UT+ G2, solid and cribriform types with comedonecrosis).  She has undergone neoadjuvant targeted chemotherapy  with Taxotere carboplatinum, Herceptin and Perjeta.  Her last dose of trastuzumab/pertuzumab was 4/17/2025.     She underwent a bilateral skin sparing mastectomy with left sentinel lymph node biopsy followed by immediate breast reconstruction with tissue expanders on 5/21/2025.  She did well postoperatively but required supplemental oxygen in the recovery room.  She was kept for observation overnight and gradually weaned off supplemental oxygen and was not requiring it by the morning and keep her oxygen saturations in the normal range.  She ambulated in the room and through the [place] with assistance and a walker without issue.  Her drain output was serosanguineous and appropriate with about 90 cc on the right and 60 cc on the left after surgery.  Her skin flaps are healthy and well-perfused  and there is no underlying hematoma.  She was deemed safe and appropriate for discharge to home.    Procedures Performed    Procedure(s):  Bilateral skin sparing mastectomy with left sentinel lymph node biopsy followed by immediate tissue expander reconstruction  BILATERAL IMMEDIATE BREAST RECONSTRUCTION, BREAST TISSUE EXPANDER INSERTION AND CORTIVA BIOLOGIC  -------------------       Consults:   Consults       No orders found for last 30 day(s).            Pertinent Test Results:  Surgical pathology pending    Condition on Discharge: Good    Vital Signs  Temp:  [97.4 °F (36.3 °C)-97.7 °F (36.5 °C)] 97.7 °F (36.5 °C)  Heart Rate:  [] 86  Resp:  [16-21] 16  BP: (106-150)/(68-89) 107/69    Physical Exam:  Physical Exam  Constitutional:       Appearance: Normal appearance.   HENT:      Head: Normocephalic and atraumatic.      Mouth/Throat:      Mouth: Mucous membranes are moist.      Pharynx: Oropharynx is clear.   Eyes:      Extraocular Movements: Extraocular movements intact.      Conjunctiva/sclera: Conjunctivae normal.      Pupils: Pupils are equal, round, and reactive to light.   Cardiovascular:      Rate and Rhythm: Normal rate and regular rhythm.   Pulmonary:      Effort: Pulmonary effort is normal.      Breath sounds: Normal breath sounds.   Chest:   Breasts:     Right: Absent.      Left: Absent.          Comments: Bilateral breasts and nipple areolar complexes are surgically absent with transverse linear incision dressed with Steri-Strips and Tegaderm, clean dry and intact.  Skin flaps are healthy and well-perfused with palpable tissue expander beneath and no fluid collection, ecchymosis, or hematoma.  2 Dion drains on each side of the chest with serosanguineous fluid in each and drain sites dressed with Biopatch and Tegaderm also clean dry and intact.  Abdominal:      General: Abdomen is flat.      Palpations: Abdomen is soft.   Musculoskeletal:         General: Normal range of motion.      Cervical  back: Normal range of motion and neck supple.   Skin:     General: Skin is warm and dry.   Neurological:      General: No focal deficit present.      Mental Status: She is alert and oriented to person, place, and time.   Psychiatric:         Mood and Affect: Mood normal.         Behavior: Behavior normal.         Discharge Disposition  Home or Self Care    Discharge Medications     Discharge Medications        Continue These Medications        Instructions Start Date   albuterol sulfate  (90 Base) MCG/ACT inhaler  Commonly known as: PROVENTIL HFA;VENTOLIN HFA;PROAIR HFA   2 puffs, Inhalation, Every 4 Hours PRN      buPROPion  MG 24 hr tablet  Commonly known as: Wellbutrin XL   150 mg, Oral, Daily      Claritin 10 MG tablet  Generic drug: loratadine   10 mg, Daily      clobetasol 0.05 % external solution  Commonly known as: TEMOVATE   Apply  topically to the appropriate area as directed.      diphenoxylate-atropine 2.5-0.025 MG per tablet  Commonly known as: LOMOTIL   1 tablet, Oral, 3 Times Daily      gabapentin 300 MG capsule  Commonly known as: NEURONTIN   300 mg, Oral, Nightly      lidocaine-prilocaine 2.5-2.5 % cream  Commonly known as: EMLA   1 Application, Topical, Take As Directed, Apply to port 1 hour prior to access      montelukast 10 MG tablet  Commonly known as: Singulair   10 mg, Oral, Nightly      ondansetron 8 MG tablet  Commonly known as: ZOFRAN   8 mg, Oral, 3 Times Daily PRN      pantoprazole 40 MG EC tablet  Commonly known as: Protonix   40 mg, Oral, 2 Times Daily      potassium chloride 20 MEQ CR tablet  Commonly known as: KLOR-CON M20   20 mEq, Oral, Daily      propranolol 10 MG tablet  Commonly known as: INDERAL   10 mg, Oral, 3 Times Daily PRN      vilazodone 20 MG tablet tablet  Commonly known as: VIIBRYD   20 mg, Oral, Daily               Discharge Diet: Regular    Activity at Discharge: No heavy lifting over 10 pounds, no strenuous activity until follow-up, okay to work on  range of motion with both arms.    Follow-up Appointments  Future Appointments   Date Time Provider Department Center   6/5/2025  9:00 AM Gonzalez Vanessa MD [facility]   6/5/2025 10:00 AM Nora Saldivar MD [facility]   6/19/2025  8:30 AM Collin Martinez MD [facility] None   7/17/2025  9:15 AM Leticia Gerber APRN [facility]         Test Results Pending at Discharge  Pending Results       None             Gonzalez Vanessa MD  05/22/25  08:44 EDT

## 2025-05-22 NOTE — DISCHARGE INSTR - ACTIVITY
No heavy lifting over 10 pounds, no strenuous activity until follow-up, okay to work on range of motion with both arms.

## 2025-05-22 NOTE — PLAN OF CARE
Goal Outcome Evaluation:            Pt is resting quietly abed with eyes closed at this time. No s/sx of any ASE are noted. Pt is noted to maintain O2 at 90% or higher this night. Pt  has ambulated with staff and walker without difficulty. POC is on-going.

## 2025-05-23 NOTE — CASE MANAGEMENT/SOCIAL WORK
Case Management Discharge Note      Final Note: Routine home         Selected Continued Care - Discharged on 5/22/2025 Admission date: 5/21/2025 - Discharge disposition: Home or Self Care               Transportation Services  Private: Car    Final Discharge Disposition Code: 01 - home or self-care

## 2025-05-28 LAB
LAB AP CASE REPORT: NORMAL
LAB AP CLINICAL INFORMATION: NORMAL
LAB AP DIAGNOSIS COMMENT: NORMAL
LAB AP SYNOPTIC CHECKLIST: NORMAL
PATH REPORT.FINAL DX SPEC: NORMAL
PATH REPORT.GROSS SPEC: NORMAL

## 2025-06-02 ENCOUNTER — TELEPHONE (OUTPATIENT)
Dept: ONCOLOGY | Facility: CLINIC | Age: 45
End: 2025-06-02
Payer: COMMERCIAL
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
## 2025-06-02 NOTE — TELEPHONE ENCOUNTER
Caller: Mary Ann Fletcher    Relationship: Self    Best call back number: [phone]      What is the best time to reach you: ANYTIME     What was the call regarding: PT IS NEEDING TO R/S 6/5 APPT AND R/S NEXT AVAILABLE. PLEASE CB TO ADVISE

## 2025-06-03 ENCOUNTER — OFFICE VISIT (OUTPATIENT)
Age: 45
End: 2025-06-03
Payer: COMMERCIAL

## 2025-06-03 VITALS
BODY MASS INDEX: 48.11 KG/M2 | OXYGEN SATURATION: 95 % | HEIGHT: 63 IN | TEMPERATURE: 98.4 F | DIASTOLIC BLOOD PRESSURE: 87 MMHG | HEART RATE: 104 BPM | WEIGHT: 271.5 LBS | SYSTOLIC BLOOD PRESSURE: 139 MMHG

## 2025-06-03 DIAGNOSIS — C50.912 BREAST CANCER, STAGE 1, ESTROGEN RECEPTOR POSITIVE, LEFT: Primary | ICD-10-CM

## 2025-06-03 DIAGNOSIS — Z17.0 BREAST CANCER, STAGE 1, ESTROGEN RECEPTOR POSITIVE, LEFT: Primary | ICD-10-CM

## 2025-06-03 PROCEDURE — 99024 POSTOP FOLLOW-UP VISIT: CPT | Performed by: SURGERY

## 2025-06-03 RX ORDER — CEPHALEXIN 500 MG/1
CAPSULE ORAL
COMMUNITY
Start: 2025-05-20

## 2025-06-03 RX ORDER — PROMETHAZINE HYDROCHLORIDE 25 MG/1
TABLET ORAL
COMMUNITY
Start: 2025-05-17

## 2025-06-03 RX ORDER — DIAZEPAM 2 MG/1
2 TABLET ORAL
COMMUNITY
Start: 2025-05-09

## 2025-06-03 RX ORDER — HYDROCODONE BITARTRATE AND ACETAMINOPHEN 5; 325 MG/1; MG/1
1 TABLET ORAL EVERY 6 HOURS PRN
COMMUNITY
Start: 2025-05-29 | End: 2025-06-05
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
## 2025-06-06 ENCOUNTER — HOSPITAL ENCOUNTER (OUTPATIENT)
Dept: ONCOLOGY | Facility: HOSPITAL | Age: 45
Discharge: HOME | End: 2025-06-06
Admitting: INTERNAL MEDICINE
Payer: COMMERCIAL

## 2025-06-06 ENCOUNTER — OFFICE VISIT (OUTPATIENT)
Dept: ONCOLOGY | Facility: CLINIC | Age: 45
End: 2025-06-06
Payer: COMMERCIAL

## 2025-06-06 VITALS
WEIGHT: 270 LBS | TEMPERATURE: 98.1 F | HEIGHT: 63 IN | HEART RATE: 106 BPM | OXYGEN SATURATION: 96 % | SYSTOLIC BLOOD PRESSURE: 139 MMHG | RESPIRATION RATE: 20 BRPM | DIASTOLIC BLOOD PRESSURE: 83 MMHG | BODY MASS INDEX: 47.84 KG/M2

## 2025-06-06 DIAGNOSIS — C50.912 BREAST CANCER, STAGE 1, ESTROGEN RECEPTOR POSITIVE, LEFT: Primary | ICD-10-CM

## 2025-06-06 DIAGNOSIS — C50.912 BREAST CANCER, STAGE 2, LEFT: ICD-10-CM

## 2025-06-06 DIAGNOSIS — C50.912 BREAST CANCER, STAGE 2, LEFT: Primary | ICD-10-CM

## 2025-06-06 DIAGNOSIS — Z51.81 ENCOUNTER FOR MONITORING CARDIOTOXIC DRUG THERAPY: ICD-10-CM

## 2025-06-06 DIAGNOSIS — Z79.899 ENCOUNTER FOR MONITORING CARDIOTOXIC DRUG THERAPY: ICD-10-CM

## 2025-06-06 DIAGNOSIS — Z17.0 BREAST CANCER, STAGE 1, ESTROGEN RECEPTOR POSITIVE, LEFT: Primary | ICD-10-CM

## 2025-06-06 LAB
BASOPHILS # BLD AUTO: 0.03 10*3/MM3 (ref 0–0.2)
BASOPHILS NFR BLD AUTO: 0.4 % (ref 0–1.5)
DEPRECATED RDW RBC AUTO: 51.4 FL (ref 37–54)
EOSINOPHIL # BLD AUTO: 0.24 10*3/MM3 (ref 0–0.4)
EOSINOPHIL NFR BLD AUTO: 3.3 % (ref 0.3–6.2)
ERYTHROCYTE [DISTWIDTH] IN BLOOD BY AUTOMATED COUNT: 13.4 % (ref 12.3–15.4)
HCT VFR BLD AUTO: 34.2 % (ref 34–46.6)
HGB BLD-MCNC: 10.8 G/DL (ref 12–15.9)
LYMPHOCYTES # BLD AUTO: 2.99 10*3/MM3 (ref 0.7–3.1)
LYMPHOCYTES NFR BLD AUTO: 41.1 % (ref 19.6–45.3)
MCH RBC QN AUTO: 34.4 PG (ref 26.6–33)
MCHC RBC AUTO-ENTMCNC: 31.6 G/DL (ref 31.5–35.7)
MCV RBC AUTO: 108.9 FL (ref 79–97)
MONOCYTES # BLD AUTO: 0.36 10*3/MM3 (ref 0.1–0.9)
MONOCYTES NFR BLD AUTO: 4.9 % (ref 5–12)
NEUTROPHILS NFR BLD AUTO: 3.66 10*3/MM3 (ref 1.7–7)
NEUTROPHILS NFR BLD AUTO: 50.3 % (ref 42.7–76)
PLATELET # BLD AUTO: 282 10*3/MM3 (ref 140–450)
PMV BLD AUTO: 10.3 FL (ref 6–12)
RBC # BLD AUTO: 3.14 10*6/MM3 (ref 3.77–5.28)
WBC NRBC COR # BLD AUTO: 7.28 10*3/MM3 (ref 3.4–10.8)

## 2025-06-06 PROCEDURE — 85025 COMPLETE CBC W/AUTO DIFF WBC: CPT | Performed by: INTERNAL MEDICINE

## 2025-06-06 PROCEDURE — 25010000002 HEPARIN LOCK FLUSH PER 10 UNITS: Performed by: INTERNAL MEDICINE

## 2025-06-06 RX ORDER — HEPARIN SODIUM (PORCINE) LOCK FLUSH IV SOLN 100 UNIT/ML 100 UNIT/ML
500 SOLUTION INTRAVENOUS AS NEEDED
OUTPATIENT
Start: 2025-06-06

## 2025-06-06 RX ORDER — HEPARIN SODIUM (PORCINE) LOCK FLUSH IV SOLN 100 UNIT/ML 100 UNIT/ML
500 SOLUTION INTRAVENOUS AS NEEDED
Status: DISCONTINUED | OUTPATIENT
Start: 2025-06-06 | End: 2025-06-07 | Stop reason: HOSPADM

## 2025-06-06 RX ORDER — SODIUM CHLORIDE 0.9 % (FLUSH) 0.9 %
20 SYRINGE (ML) INJECTION AS NEEDED
OUTPATIENT
Start: 2025-06-06

## 2025-06-06 RX ORDER — SODIUM CHLORIDE 0.9 % (FLUSH) 0.9 %
20 SYRINGE (ML) INJECTION AS NEEDED
Status: DISCONTINUED | OUTPATIENT
Start: 2025-06-06 | End: 2025-06-07 | Stop reason: HOSPADM

## 2025-06-06 RX ADMIN — Medication 20 ML: at 10:31

## 2025-06-06 RX ADMIN — HEPARIN 500 UNITS: 100 SYRINGE at 10:32

## 2025-06-06 NOTE — PATIENT INSTRUCTIONS
Kadcyla - Ado-Trastuzumab Emtansine Injection  What is this medication?  ADO-TRASTUZUMAB EMTANSINE (ADD oh thaddeus TOO [facility] mab em TAN zine) treats breast cancer. It works by blocking a protein that causes cancer cells to grow and multiply. This helps to slow or stop the spread of cancer cells.    This medicine may be used for other purposes; ask your health care provider or pharmacist if you have questions.    COMMON BRAND NAME(S): Kadcyla    What should I tell my care team before I take this medication?  They need to know if you have any of these conditions:    Heart failure  Liver disease  Low platelet levels  Lung disease  Tingling of the fingers or toes or other nerve disorder  An unusual or allergic reaction to ado-trastuzumab emtansine, other medications, foods, dyes, or preservatives  Pregnant or trying to get pregnant  Breast-feeding  How should I use this medication?  This medication is infused into a vein. It is given by your care team in a hospital or clinic setting.    Talk to your care team about the use of this medication in children. Special care may be needed.    Overdosage: If you think you have taken too much of this medicine contact a poison control center or emergency room at once.    NOTE: This medicine is only for you. Do not share this medicine with others.    What if I miss a dose?  Keep appointments for follow-up doses. It is important not to miss your dose. Call your care team if you are unable to keep an appointment.    What may interact with this medication?  Atazanavir  Boceprevir  Clarithromycin  Dalfopristin; quinupristin  Delavirdine  Indinavir  Isoniazid, INH  Itraconazole  Ketoconazole  Nefazodone  Nelfinavir  Ritonavir  Telaprevir  Telithromycin  Tipranavir  Voriconazole  This list may not describe all possible interactions. Give your health care provider a list of all the medicines, herbs, non-prescription drugs, or dietary supplements you use. Also tell them if you smoke, drink  alcohol, or use illegal drugs. Some items may interact with your medicine.    What should I watch for while using this medication?  This medication may make you feel generally unwell. This is not uncommon, as chemotherapy can affect healthy cells as well as cancer cells. Report any side effects. Continue your course of treatment even though you feel ill unless your care team tells you to stop.    You may need blood work while taking this medication.    This medication may increase your risk to bruise or bleed. Call your care team if you notice any unusual bleeding.    Be careful brushing or flossing your teeth or using a toothpick because you may get an infection or bleed more easily. If you have any dental work done, tell your dentist you are receiving this medication.    Talk to your care team if you may be pregnant. Serious birth defects can occur if you take this medication during pregnancy and for 7 months after the last dose. You will need a negative pregnancy test before starting this medication. Contraception is recommended while taking this medication and for 7 months after the last dose. Your care team can help you find the option that works for you.    If your partner can get pregnant, use a condom during sex while taking this medication and for 4 months after the last dose.    Do not breastfeed while taking this medication and for 7 months after the last dose.    This medication may cause infertility. Talk to your care team if you are concerned with your fertility.    What side effects may I notice from receiving this medication?  Side effects that you should report to your care team as soon as possible:    Allergic reactions--skin rash, itching, hives, swelling of the face, lips, tongue, or throat  Bleeding--bloody or black, tar-like stools, vomiting blood or brown material that looks like coffee grounds, red or dark brown urine, small red or purple spots on skin, unusual bruising or bleeding  Dry  cough, shortness of breath or trouble breathing  Heart failure--shortness of breath, swelling of the ankles, feet, or hands, sudden weight gain, unusual weakness or fatigue  Infusion reactions--chest pain, shortness of breath or trouble breathing, feeling faint or lightheaded  Liver injury--right upper belly pain, loss of appetite, nausea, light-colored stool, dark yellow or brown urine, yellowing skin or eyes, unusual weakness or fatigue  Pain, tingling, or numbness in the hands or feet  Painful swelling, warmth, or redness of the skin, blisters or sores at the infusion site  Side effects that usually do not require medical attention (report to your care team if they continue or are bothersome):    Constipation  Fatigue  Headache  Muscle pain  Nausea  This list may not describe all possible side effects. Call your doctor for medical advice about side effects. You may report side effects to FDA at 3-970-FDA-6833.    Where should I keep my medication?  This medication is given in a hospital or clinic. It will not be stored at home.    NOTE: This sheet is a summary. It may not cover all possible information. If you have questions about this medicine, talk to your doctor, pharmacist, or health care provider.    © 2025 [employer] (2023-05-05 00:00:00)    Additional Information From [employer] About Kadcyla  Self-Care Tips:  Drink at least two to three quarts of fluid every 24 hours, unless you are instructed otherwise.    You may be at risk of infection so try to avoid crowds or people with colds, and report fever or any other signs of infection immediately to your health care provider.    Wash your hands often.    To help treat/prevent mouth sores, use a soft toothbrush, and rinse three times a day with 1 teaspoon of baking soda mixed with 8 ounces of water.    Use an electric razor and a soft toothbrush to minimize bleeding.    Avoid contact sports or activities that could cause injury.    To reduce  nausea, take anti-nausea medications as prescribed by your doctor, and eat small, frequent meals.    Follow regimen of anti-diarrhea medication as prescribed by your health care professional.    Eat foods that may help reduce diarrhea (see managing side effects - diarrhea).    Acetaminophen or ibuprofen may help relieve discomfort from fever, headache and/or generalized aches and pains. However, be sure to talk with your doctor before taking it.    Avoid sun exposure. Wear SPF 15 (or higher) sun block and protective clothing.    In general, drinking alcoholic beverages should be kept to a minimum or avoided completely. You should discuss this with your doctor.    Get plenty of rest.    Maintain good nutrition.    Remain active as you are able. Gentle exercise is encouraged such as a daily walk.    If you experience symptoms or side effects, be sure to discuss them with your health care team. They can prescribe medications and/or offer other suggestions that are effective in managing such problems.      When to contact your doctor or health care provider:  Contact your health care provider immediately, day or night and go to the emergency room, if you should experience any of the following symptoms:    Fever of 100.4º F (38º C) or higher, chills (possible signs of infection)  Trouble breathing, bad cough, swelling of the ankles/legs, palpitations, weight gain of more than 5 pounds in 24 hours, dizziness or loss of consciousness  Chest pain or pressure  The following symptoms require medical attention, but are not an emergency. Contact your health care provider within 24 hours of noticing any of the following:    Signs of infection: Fever, chills, very bad sore throat, ear or sinus pain, cough with or without sputum, pain with passing urine, wounds that will not heal  Yellowing of the eyes or skin  Dark urine  Right-sided abdominal pain  Nausea (interferes with ability to eat and unrelieved with prescribed  medication)  Vomiting (vomiting more than 4-5 times in a 24 hour period)  Diarrhea (4-6 episodes in a 24-hour period)  Dizziness or lightheadedness  Bad headache  Unusual bleeding or bruising  Extreme fatigue (unable to carry on self-care activities)  Muscle weakness  Mouth sores (painful redness, swelling or ulcers)  Always inform your health care provider if you experience any unusual symptoms    Some severe but rare possible side effects of Kadcyla™ include:    Liver problems (liver failure)  Heart-decrease in left ventricular ejection fraction (LVEF)

## 2025-06-17 ENCOUNTER — OFFICE VISIT (OUTPATIENT)
Dept: FAMILY MEDICINE CLINIC | Facility: CLINIC | Age: 45
End: 2025-06-17
Payer: COMMERCIAL

## 2025-06-17 VITALS
DIASTOLIC BLOOD PRESSURE: 82 MMHG | BODY MASS INDEX: 49.33 KG/M2 | OXYGEN SATURATION: 97 % | TEMPERATURE: 97.9 F | HEIGHT: 63 IN | WEIGHT: 278.4 LBS | HEART RATE: 111 BPM | SYSTOLIC BLOOD PRESSURE: 151 MMHG

## 2025-06-17 DIAGNOSIS — M54.50 ACUTE MIDLINE LOW BACK PAIN WITHOUT SCIATICA: ICD-10-CM

## 2025-06-17 DIAGNOSIS — I10 HYPERTENSION, UNSPECIFIED TYPE: Primary | ICD-10-CM

## 2025-06-17 DIAGNOSIS — R42 VERTIGO: ICD-10-CM

## 2025-06-17 PROCEDURE — 99214 OFFICE O/P EST MOD 30 MIN: CPT | Performed by: NURSE PRACTITIONER

## 2025-06-17 RX ORDER — METOPROLOL SUCCINATE 25 MG/1
25 TABLET, EXTENDED RELEASE ORAL DAILY
Qty: 90 TABLET | Refills: 0 | Status: SHIPPED | OUTPATIENT
Start: 2025-06-17

## 2025-06-17 RX ORDER — HYDROCODONE BITARTRATE AND ACETAMINOPHEN 5; 325 MG/1; MG/1
1 TABLET ORAL EVERY 6 HOURS PRN
COMMUNITY
Start: 2025-06-13 | End: 2025-06-20

## 2025-06-17 RX ORDER — ONDANSETRON 8 MG/1
8 TABLET, FILM COATED ORAL EVERY 8 HOURS PRN
COMMUNITY
Start: 2025-06-16

## 2025-06-17 RX ORDER — MECLIZINE HYDROCHLORIDE 25 MG/1
25 TABLET ORAL 3 TIMES DAILY PRN
Qty: 30 TABLET | Refills: 0 | Status: SHIPPED | OUTPATIENT
Start: 2025-06-17
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
## 2025-06-17 NOTE — PROGRESS NOTES
[facility] RADIATION ONCOLOGY  FOLLOW-UP    Name: Mary Ann Fletcher  YOB: 1980  MRN #: [id number]  Date of Service: 6/21/2025    Chief Complaint:      Breast cancer follow-up after surgery    Diagnosis:   Encounter Diagnosis   Name Primary?    Breast cancer, stage 1, estrogen receptor positive, left Yes            Interval History       Mary Ann Fletcher is a 45 y.o. female  who was diagnosed with Stage IB (cT3, cN0(f), cM0, G3, ER+, SC+, HER2+) invasive ductal carcinoma of the left breast. She received neoadjuvant chemotherapy with a partial response to therapy on MRI imaging. She underwent a bilateral mastectomy on 5/21/2025 (Dr. Vanessa) with immediate tissue expander based reconstruction (by Dr. Peoples). Pathology results showed complete excision of both the invasive and in situ carcinoma in the left breast and all 4 sentinel lymph nodes were negative for metastatic carcinoma. She was last seen in our office on 5/1/2025 with plans to finalize radiation planning post surgery.     The patient reports she's healing well. She still has drains in place and is hoping to get them removed soon. She is here to discuss radiation therapy recommendations based on her pathology results.         Imaging     XR Chest PA & Lateral  Result Date: 5/13/2025  Impression: No acute cardiopulmonary findings. Electronically Signed: Nahun Ly MD  5/13/2025 2:33 PM EDT  Workstation ID: [id number]    MRI Breast Bilateral Diagnostic W WO Contrast  Result Date: 2/27/2025  1.     Partial response to chemotherapy of malignant non-mass enhancement and malignant mass centered in the 4:00 left breast as described above. Overall extent of persistent enhancing disease measures up to 12.3 cm AP, precluding breast conservation. Please note that for surgical purposes, the non-mass enhancement is very close to the nipple. 2.     Stable size of posterior third left breast skin lesion. Correlation with visual inspection is again  "recommended. This skin lesion is in the lower inner quadrant as compared to the lower outer quadrant of the known malignancy. 3.     Stable size of biopsy-proven malignant left axillary lymph node. 4.     No MR evidence of malignancy in the right breast. No evidence of malignancy elsewhere in the visualized chest or abdomen.   BIRADS ASSESSMENT:Category 6: Known Biopsy-Proven Malignancy      2/27/2025 11:45 AM by Leighton Augustine on Workstation: [id number]              Pathology   Tissue Pathology Exam: Bilateral mastectomy with left sentinel lymph node biopsy followed by immediate breast reconstruction with tissue expander  Date: 5/21/2025     Final Diagnosis   Specimen 1 (\"additional inferior/medial\", left breast, margin excision):  Benign fibroadipose tissue with no significant pathologic changes  No breast tissue is identified  No malignancy identified     Specimen 2 ([place] lymph node, left, excisional biopsy):  4 sentinel lymph nodes negative for malignancy by H&E and immunohistochemistry, see comment     Specimen 3 (breast, right, mastectomy):  Benign breast with fibrocystic changes encompassing stromal fibrosis, duct ectasia and microcysts  Nipple skin with no significant pathologic changes  No in situ or invasive malignancy identified     Specimen 4 (breast, left, mastectomy):  Residual invasive ductal carcinoma (size 1.2 cm), completely excised  Residual ductal carcinoma in situ, intermediate nuclear grade, solid type, completely excised  Biopsy site changes  See synoptic report for additional details     DEBRA   Electronically signed by Nikhil Zaidi MD on 5/28/2025 at 1423 EDT   Synoptic Checklist   INVASIVE CARCINOMA OF THE BREAST: Resection   8th Edition - Protocol posted: 6/19/2024INVASIVE CARCINOMA OF THE BREAST: RESECTION - 4  SPECIMEN   Procedure  Total mastectomy   Specimen Laterality  Left   TUMOR   Tumor Site  Clock position     3 o'clock   Histologic Type  Invasive carcinoma of no special type " (ductal)   Histologic Grade ([place] Histologic Score)     Glandular (Acinar) / Tubular Differentiation  Score 3   Nuclear Pleomorphism  Score 3   Mitotic Rate  Score 1   Overall Grade  Grade 2 (scores of 6 or 7)   Tumor Size  Greatest dimension of largest invasive focus (Millimeters): 12 mm   Tumor Focality  Single focus of invasive carcinoma   Ductal Carcinoma In Situ (DCIS)  Present   Architectural Patterns  Solid   Nuclear Grade  Grade II (intermediate)   Necrosis  Not identified   Lobular Carcinoma In Situ (LCIS)  Not identified   Lymphatic and / or Vascular Invasion  Not identified   Dermal Lymphatic and / or Vascular Invasion  No skin present   Microcalcifications  Present in non-neoplastic tissue   Treatment Effect in the Breast  Probable or definite response to presurgical therapy in the invasive carcinoma   Treatment Effect in the Lymph Nodes  No lymph node metastases and no fibrous scarring or histiocytic aggregates in the nodes   MARGINS   Margin Status for Invasive Carcinoma  All margins negative for invasive carcinoma   Distance from Invasive Carcinoma to Closest Margin  Greater than: 10 mm   Closest Margin(s) to Invasive Carcinoma  Posterior   Margin Status for DCIS  All margins negative for DCIS   Distance from DCIS to Closest Margin  Greater than: 10 mm   REGIONAL LYMPH NODES   Regional Lymph Node Status  All regional lymph nodes negative for tumor   Total Number of Lymph Nodes Examined (sentinel and non-sentinel)  4   Number of [place] Nodes Examined  4   pTNM CLASSIFICATION (AJCC 8th Edition)   Reporting of pT, pN, and (when applicable) pM categories is based on information available to the pathologist at the time the report is issued. As per the AJCC (Chapter 1, 8th Ed.) it is the managing physician's responsibility to establish the final pathologic stage based upon all pertinent information, including but potentially not limited to this pathology report.   Modified Classification  y   pT  Category  pT1c   pN Category  pN0   N Suffix  (sn)   ADDITIONAL FINDINGS   Additional Findings  Biopsy site changes   SPECIAL STUDIES        Estrogen Receptor (ER) Status  Positive (greater than 10% of cells demonstrate nuclear positivity)   Percentage of Cells with Nuclear Positivity  100 %        Progesterone Receptor (PgR) Status  Positive   Percentage of Cells with Nuclear Positivity  100 %        HER2 (by immunohistochemistry)  Equivocal (Score 2+)        HER2 (by in situ hybridization)  Positive (amplified)   Testing Performed on Case Number  [id number]   .      Comment    Specimen 2: Pancytokeratin was applied with valid controls and is negative within the submitted tissue, supporting the rendered diagnosis.  DEBRA   Gross Description    1. Breast, Left.  Received in formalin designated left breast additional inferior/medial is a portion of yellow fatty tissue measuring 8.1 x 4 x 1.8 cm.  Cysts metallic clips are present along 1 surface designating the new margin.  This portion is inked black and the specimen is serially sectioned revealing yellow glistening cut surfaces without nodule or mass.  Entirely submitted in cassette A through I.  DEBRA     2. Breast, Left.  Received in formalin designated left breast sentinel node are several fragments of yellow fatty tissue measuring 4 cm in greatest aggregate dimension.  Candidate lymph nodes are identified ranging from 0.3 to 1.8 cm.  The largest lymph node is serially sectioned and submitted in cassette A, 2 medium sized nodes are submitted as retrieved in cassette B and the smallest lymph node is submitted in cassette C.  DEBRA     3. Breast, Right.  Received in formalin designated right breast is a mastectomy specimen measuring 19 x 18.5 x 6 cm and weighs 947 g.  The anterior surface is partially covered with a wrinkled tan skin ellipse containing a nonretracted nipple.  No skin scars or lesions are identified.  Sutures are present on the skin ellipse with the  following orientation: Short superior and long lateral.  The breast is serially sectioned revealing yellow glistening cut surfaces admixed with thin white fibrous areas concentrated centrally.  No nodules or masses are identified.  Sections submitted as follows: Cassette A nipple, cassette B and C upper inner quadrant, cassette D and E lower inner quadrant, cassette F and G lower outer quadrant, cassette H and I upper outer quadrant, cassette J central.  DEBRA     4. Breast, Left.  Received in formalin designated left breast is a mastectomy specimen measuring 21 x 19.5 x 6 cm and weighs 954.4 g.  The anterior portion is partially covered with a wrinkled tan skin ellipse and containing a nonretracted nipple.  No skin scars or lesions are identified.  Sutures are present on the skin ellipse with the following designation: Short superior and long lateral.  The breast is serially sectioned revealing yellow glistening cut surfaces notable for tan-white fibrous areas concentrated centrally.  There is discoloration of the tissue just deep to the nipple secondary to dye.  No discrete nodules or masses are identified.  Nipple sections are submitted in cassette A and sections from the 3 and 4:00 areas are submitted in cassette B through J.  After initial histologic examination, additional sections from the 3:00 area are submitted in cassettes K and L.  [facility]         Labs       Hematology:  WBC   Date Value Ref Range Status   06/06/2025 7.28 3.40 - 10.80 10*3/mm3 Final     RBC   Date Value Ref Range Status   06/06/2025 3.14 (L) 3.77 - 5.28 10*6/mm3 Final     Hemoglobin   Date Value Ref Range Status   06/06/2025 10.8 (L) 12.0 - 15.9 g/dL Final     Hematocrit   Date Value Ref Range Status   06/06/2025 34.2 34.0 - 46.6 % Final     Platelets   Date Value Ref Range Status   06/06/2025 282 140 - 450 10*3/mm3 Final     Chemistry:  Lab Results   Component Value Date    GLUCOSE 190 (H) 05/09/2025    BUN 9 05/09/2025    CREATININE 0.91  05/09/2025    BCR 9.9 05/09/2025    K 3.9 05/21/2025    CO2 23.0 05/09/2025    CALCIUM 9.2 05/09/2025    ALBUMIN 4.2 04/07/2025    AST 19 04/07/2025    ALT 26 04/07/2025         Problem List       Patient Active Problem List   Diagnosis    Psoriasis    Hepatic steatosis    Bilateral ovarian cysts    Lumbar degenerative disc disease    Breast cancer, stage 1, estrogen receptor positive, left    Dizziness    Anxiety and depression    Nausea and vomiting    Personal history of breast cancer    Hypokalemia    Breast cancer, stage 1, estrogen receptor positive          Current Medications       Current Outpatient Medications   Medication Instructions    albuterol sulfate  (90 Base) MCG/ACT inhaler 2 puffs, Inhalation, Every 4 Hours PRN    buPROPion XL (WELLBUTRIN XL) 150 mg, Oral, Daily    cephalexin (KEFLEX) 500 MG capsule     clobetasol (TEMOVATE) 0.05 % external solution Apply  topically to the appropriate area as directed.    diazePAM (VALIUM) 2 mg    diphenoxylate-atropine (LOMOTIL) 2.5-0.025 MG per tablet 1 tablet, Oral, 3 Times Daily    gabapentin (NEURONTIN) 300 mg, Oral, Nightly    lidocaine-prilocaine (EMLA) 2.5-2.5 % cream 1 Application, Topical, Take As Directed, Apply to port 1 hour prior to access    loratadine (CLARITIN) 10 mg, Daily    meclizine (ANTIVERT) 25 mg, Oral, 3 Times Daily PRN    metoprolol succinate XL (TOPROL-XL) 25 mg, Oral, Daily    ondansetron (ZOFRAN) 8 mg, Every 8 Hours PRN    pantoprazole (PROTONIX) 40 mg, Oral, 2 Times Daily    promethazine (PHENERGAN) 25 MG tablet     propranolol (INDERAL) 10 mg, Oral, 3 Times Daily PRN    vilazodone (VIIBRYD) 20 mg, Oral, Daily          Allergies       Allergies   Allergen Reactions    Penicillins Hives     Beta lactam allergy details  Antibiotic reaction: hives  Age at reaction: infant  Dose to reaction time: unknown  Reason for antibiotic: unknown  Epinephrine required for reaction?: no  Tolerated antibiotics: unknown               Review of  Systems       Vitals:    06/19/25 0832   BP: 111/78   Pulse: 98   Resp: 18   SpO2: 97%      Physical Exam  Constitutional:       General: She is not in acute distress.  HENT:      Head: Normocephalic and atraumatic.   Pulmonary:      Effort: Pulmonary effort is normal. No respiratory distress.   Chest:      Comments: Patient declined chest wall exam given lack of healing concerns and upcoming appointments with other providers.   Neurological:      Mental Status: She is alert and oriented to person, place, and time. Mental status is at baseline.   Psychiatric:         Mood and Affect: Mood normal.         Behavior: Behavior normal.          ECOG:  Restricted in physically strenuous activity but ambulatory and able to carry out work of a light or sedentary nature, e.g., light house work, office work = 1          Assessment and Plan       Assessment:        Mary Ann Fletcher is a 45 y.o. female  who was diagnosed with Stage IB (cT3, cN0(f), cM0, G3, ER+, [place]+, HER2+) invasive ductal carcinoma of the left breast. She received neoadjuvant chemotherapy with a partial response to therapy on MRI imaging. She underwent a bilateral mastectomy on 5/21/2025 (Dr. Vanessa) with immediate tissue expander based reconstruction (by Dr. Peoples). Pathology results showed complete excision of both the invasive and in situ carcinoma in the left breast and all 4 sentinel lymph nodes were negative for metastatic carcinoma. Final staging was consistent with ypT1c, ypN0(sn), cM0, G2, ER+, [place]+, HER2+. She was last seen in our office on 5/1/2025 with plans to finalize radiation planning post surgery.     Diagnoses and all orders for this visit:    1. Breast cancer, stage 1, estrogen receptor positive, left (Primary)       Plan:      Orders:  - Discussed options with patient. After discussing potential risks and benefits of adjuvant radiation, patient elected to forgo chest wall and regional michelle radiation. She can return to our office on an as needed  basis.   ASSESSMENT      1)  Ms. Fletcher is a 45 y.o. female with Stage IB (cT3, cN0(f), cM0, G3, ER+, SC+, HER2+) invasive ductal carcinoma of the left breast. Final surgical staging was ypT1c, ypN0(sn), cM0, G2, ER+, SC+, HER2+.            2)  Hormone Receptor Status:  ER: positive SC positive, HER2/Kuldip: positive             3)   Patient underwent bilateral skin sparring mastectomy with immediate tissue expander reconstruction on 5/21/2025. Final pathology demonstrated residual invasive ductal carcinoma measuring 12 mm in greatest diameter. It was grade 2 and a single focus of invasive disease. DCIS was present and grade 2. Necrosis, LCIS, LVSI, and dermal lymphatic and/or vascular invasion was not identified. There was a probable or definite response to presurgical therapy in the invasive carcinoma. There was no lymph node metastases and no fibrous scarring or histiocytic aggregates in the nodes. All margins were negative for invasive carcinoma. The closest margin measured greater than 10 mm at the posterior margin. All margins were also negative for DCIS and greater than 10 mm. Four lymph nodes were examined and all were negative for metastatic disease. The specimen was ER/SC+, HER2+. Final staging was [id number].            PLAN      1) We discussed potential pros and cons of adjuvant radiation therapy.  At time of diagnosis, the patient thought to potentially have T3 disease due to extensive non-mass enhancement seen on breast MRI.  Mammogram and pretreatment breast biopsy did identify DCIS in a more extensive region of calcifications in the left breast.  The primary breast mass seen at time of diagnosis measured 2.5 x 2.0 x 2.4 cm.  Based on this information, the patient may have only had T2 disease with more extensive DCIS in the breast.  After neoadjuvant therapy, the patient was found to have residual T1c disease with widely negative margins and DCIS present.  The patient had no detectable lymph node  metastases.  We discussed typical criteria for offering adjuvant chest wall and regional michelle radiation.  Upfront T3 disease and lymph node positive disease are both potential criteria for recommending adjuvant chest wall radiation.  Additional high risk criteria include grade 3 disease, triple negative disease, close or positive margins, LVSI, and younger age especially for age under 40.    Based on the patient's pathology after surgery, her disease was only T1c, she lacked any lymph node metastases, and she had widely negative margins.  We had a nuanced discussion of potential options moving forward.  Based on surgical pathology the patient's risk of local recurrence is likely low limiting the potential benefits of adjuvant chest wall radiation.  She also had no lymph node metastases detected.  We discussed her pretreatment imaging, her young age, and her upfront grade 3 disease as potential risk factors for local recurrence.  Despite this, the patient is receiving additional adjuvant systemic therapy with medical oncology and feels comfortable omitting radiation therapy at this time.     We discussed how radiation therapy is planned and delivered.  We discussed potential acute late side effects of treatment.  The patient was able to ask questions and have them answered to her apparent satisfaction.  Given the pathology findings, some of the pretreatment uncertainties, and her long-term goals we agreed about her plan to receive no adjuvant radiation. She can return to our office on an as needed basis going forward.         EDUCATION      Ready to learn, no apparent learning barriers were identified; learning preferences include listening. Explained diagnosis and treatment plan; patient expressed understanding of the content.            INFORMED CONSENT      The treatment alternatives, expected side effects and potential complications were discussed.  There is a small risk radiotherapy may cause permanent damage  to any normal healthy tissue or organ adjacent to the treatment site which can include the following:            Breast resulting in poor cosmetic outcome      Ribs causing an increased risk of fracture      Lung causing a risk of pneumonitis      Heart causing a risk of heart attack, conduction or valvular abnormalities      Lymphatics resulting in lymphedema      Brachial plexus resulting in neurological dysfunction      DNA of normal cells resulting in a risk of 2nd malignancy            Acute side effects including dermatitis, skin itching, skin burning, peeling or blisters, breast swelling resulting in tenderness as well as fatigue.                I spent 30 minutes caring for Mary Ann on this date of service. This time includes time spent by me in the following activities:preparing for the visit, reviewing tests, obtaining and/or reviewing a separately obtained history, performing a medically appropriate examination and/or evaluation , counseling and educating the patient/family/caregiver, ordering medications, tests, or procedures, documenting information in the medical record, and independently interpreting results and communicating that information with the patient/family/caregiver        Follow-Up       No follow-ups on file.       CC: Leticia Gerber, AP*

## 2025-06-19 ENCOUNTER — OFFICE VISIT (OUTPATIENT)
Dept: RADIATION ONCOLOGY | Facility: HOSPITAL | Age: 45
End: 2025-06-19
Payer: COMMERCIAL

## 2025-06-19 VITALS
SYSTOLIC BLOOD PRESSURE: 111 MMHG | DIASTOLIC BLOOD PRESSURE: 78 MMHG | WEIGHT: 278 LBS | HEART RATE: 98 BPM | RESPIRATION RATE: 18 BRPM | BODY MASS INDEX: 49.25 KG/M2 | OXYGEN SATURATION: 97 %

## 2025-06-19 DIAGNOSIS — C50.912 BREAST CANCER, STAGE 1, ESTROGEN RECEPTOR POSITIVE, LEFT: Primary | ICD-10-CM

## 2025-06-19 DIAGNOSIS — Z17.0 BREAST CANCER, STAGE 1, ESTROGEN RECEPTOR POSITIVE, LEFT: Primary | ICD-10-CM

## 2025-06-19 PROCEDURE — G0463 HOSPITAL OUTPT CLINIC VISIT: HCPCS | Performed by: INTERNAL MEDICINE

## 2025-07-03 ENCOUNTER — OFFICE VISIT (OUTPATIENT)
Dept: ONCOLOGY | Facility: CLINIC | Age: 45
End: 2025-07-03
Payer: COMMERCIAL

## 2025-07-03 ENCOUNTER — HOSPITAL ENCOUNTER (OUTPATIENT)
Dept: ONCOLOGY | Facility: HOSPITAL | Age: 45
Discharge: HOME | End: 2025-07-03
Admitting: INTERNAL MEDICINE
Payer: COMMERCIAL

## 2025-07-03 VITALS
BODY MASS INDEX: 50.32 KG/M2 | SYSTOLIC BLOOD PRESSURE: 150 MMHG | HEART RATE: 100 BPM | OXYGEN SATURATION: 99 % | TEMPERATURE: 99 F | WEIGHT: 284 LBS | DIASTOLIC BLOOD PRESSURE: 88 MMHG | RESPIRATION RATE: 20 BRPM | HEIGHT: 63 IN

## 2025-07-03 DIAGNOSIS — C50.912 BREAST CANCER, STAGE 1, ESTROGEN RECEPTOR POSITIVE, LEFT: Primary | ICD-10-CM

## 2025-07-03 DIAGNOSIS — Z17.0 BREAST CANCER, STAGE 1, ESTROGEN RECEPTOR POSITIVE, LEFT: Primary | ICD-10-CM

## 2025-07-03 LAB
BASOPHILS # BLD AUTO: 0.01 10*3/MM3 (ref 0–0.2)
BASOPHILS NFR BLD AUTO: 0.1 % (ref 0–1.5)
DEPRECATED RDW RBC AUTO: 45.6 FL (ref 37–54)
EOSINOPHIL # BLD AUTO: 0.15 10*3/MM3 (ref 0–0.4)
EOSINOPHIL NFR BLD AUTO: 1.5 % (ref 0.3–6.2)
ERYTHROCYTE [DISTWIDTH] IN BLOOD BY AUTOMATED COUNT: 12.6 % (ref 12.3–15.4)
HCT VFR BLD AUTO: 34.9 % (ref 34–46.6)
HGB BLD-MCNC: 11.1 G/DL (ref 12–15.9)
LYMPHOCYTES # BLD AUTO: 3.05 10*3/MM3 (ref 0.7–3.1)
LYMPHOCYTES NFR BLD AUTO: 31.1 % (ref 19.6–45.3)
MCH RBC QN AUTO: 32.6 PG (ref 26.6–33)
MCHC RBC AUTO-ENTMCNC: 31.8 G/DL (ref 31.5–35.7)
MCV RBC AUTO: 102.6 FL (ref 79–97)
MONOCYTES # BLD AUTO: 0.43 10*3/MM3 (ref 0.1–0.9)
MONOCYTES NFR BLD AUTO: 4.4 % (ref 5–12)
NEUTROPHILS NFR BLD AUTO: 6.17 10*3/MM3 (ref 1.7–7)
NEUTROPHILS NFR BLD AUTO: 62.9 % (ref 42.7–76)
PLATELET # BLD AUTO: 286 10*3/MM3 (ref 140–450)
PMV BLD AUTO: 10.2 FL (ref 6–12)
RBC # BLD AUTO: 3.4 10*6/MM3 (ref 3.77–5.28)
WBC NRBC COR # BLD AUTO: 9.81 10*3/MM3 (ref 3.4–10.8)

## 2025-07-03 PROCEDURE — 85025 COMPLETE CBC W/AUTO DIFF WBC: CPT | Performed by: INTERNAL MEDICINE

## 2025-07-03 PROCEDURE — 36591 DRAW BLOOD OFF VENOUS DEVICE: CPT

## 2025-07-03 PROCEDURE — 25010000002 HEPARIN LOCK FLUSH PER 10 UNITS: Performed by: INTERNAL MEDICINE

## 2025-07-03 RX ORDER — TAMOXIFEN CITRATE 20 MG/1
20 TABLET ORAL DAILY
Qty: 90 TABLET | Refills: 0 | Status: SHIPPED | OUTPATIENT
Start: 2025-07-03 | End: 2025-07-03

## 2025-07-03 RX ORDER — HEPARIN SODIUM (PORCINE) LOCK FLUSH IV SOLN 100 UNIT/ML 100 UNIT/ML
500 SOLUTION INTRAVENOUS AS NEEDED
Status: DISCONTINUED | OUTPATIENT
Start: 2025-07-03 | End: 2025-07-04 | Stop reason: HOSPADM

## 2025-07-03 RX ORDER — TAMOXIFEN CITRATE 20 MG/1
20 TABLET ORAL DAILY
Qty: 90 TABLET | Refills: 0 | Status: SHIPPED | OUTPATIENT
Start: 2025-07-03

## 2025-07-03 RX ORDER — HEPARIN SODIUM (PORCINE) LOCK FLUSH IV SOLN 100 UNIT/ML 100 UNIT/ML
500 SOLUTION INTRAVENOUS AS NEEDED
OUTPATIENT
Start: 2025-07-03

## 2025-07-03 RX ORDER — TAMOXIFEN CITRATE 20 MG/1
20 TABLET ORAL DAILY
Qty: 90 TABLET | Refills: 0 | Status: SHIPPED | OUTPATIENT
Start: 2025-07-03 | End: 2025-07-03 | Stop reason: SDUPTHER

## 2025-07-03 RX ORDER — SODIUM CHLORIDE 0.9 % (FLUSH) 0.9 %
20 SYRINGE (ML) INJECTION AS NEEDED
Status: DISCONTINUED | OUTPATIENT
Start: 2025-07-03 | End: 2025-07-04 | Stop reason: HOSPADM

## 2025-07-03 RX ORDER — SODIUM CHLORIDE 0.9 % (FLUSH) 0.9 %
20 SYRINGE (ML) INJECTION AS NEEDED
OUTPATIENT
Start: 2025-07-03

## 2025-07-03 RX ADMIN — Medication 20 ML: at 13:03

## 2025-07-03 RX ADMIN — Medication 500 UNITS: at 13:03

## 2025-07-03 NOTE — PATIENT INSTRUCTIONS
Nolvadex - Tamoxifen Tablets  What is this medication?  TAMOXIFEN (ta MOX i fen) prevents and treats breast cancer. It works by blocking the hormone estrogen in breast tissue, which prevents breast cancer cells from spreading or growing.    This medicine may be used for other purposes; ask your health care provider or pharmacist if you have questions.    COMMON BRAND NAME(S): Nolvadex    What should I tell my care team before I take this medication?  They need to know if you have any of these conditions:    Blood clots  Endometriosis  High cholesterol  Irregular menstrual cycles  Liver disease  Stroke  Uterine cancer  Uterine fibroids  An unusual reaction to tamoxifen, other medications, foods, dyes, or preservatives  Pregnant or trying to get pregnant  Breast-feeding  How should I use this medication?  Take this medication by mouth. Take it as directed on the prescription label at the same time every day. You can take it with or without food. If it upsets your stomach, take it with food. Keep taking it unless your care team tells you to stop.    A special MedGuide will be given to you by the pharmacist with each prescription and refill. Be sure to read this information carefully each time.    Talk to your care team about the use of this medication in children. Special care may be needed.    Overdosage: If you think you have taken too much of this medicine contact a poison control center or emergency room at once.    NOTE: This medicine is only for you. Do not share this medicine with others.    What if I miss a dose?  If you miss a dose, take it as soon as you can. If it is almost time for your next dose, take only that dose. Do not take double or extra doses.    What may interact with this medication?  Do not take this medication with any of the following:    Cisapride  Dronedarone  Ketoconazole  Levoketoconazole  Pimozide  Thioridazine  This medication may also interact with the following:    Anastrozole  Certain  medications for seizures, such as carbamazepine, phenobarbital, phenytoin  Letrozole  Other medications that cause heart rhythm changes  Paroxetine  Rifampin  Warfarin  This list may not describe all possible interactions. Give your health care provider a list of all the medicines, herbs, non-prescription drugs, or dietary supplements you use. Also tell them if you smoke, drink alcohol, or use illegal drugs. Some items may interact with your medicine.    What should I watch for while using this medication?  Visit your care team for regular checks on your progress. You will need regular pelvic exams, breast exams, and mammograms.    Talk to your care team about your risk of uterine cancer. You may be more at risk for uterine cancer if you take this medication.    Talk to your care team if you may be pregnant. Serious birth defects can occur if you take this medication during pregnancy.    Do not breastfeed while taking this medication and for 3 months after the last dose.    This medication may cause infertility. Talk with your care team if you are concerned about your fertility.    What side effects may I notice from receiving this medication?  Side effects that you should report to your care team as soon as possible:    Allergic reactions--skin rash, itching, hives, swelling of the face, lips, tongue, or throat  Blood clot--pain, swelling, or warmth in the leg, shortness of breath, chest pain  High calcium level--increased thirst or amount of urine, nausea, vomiting, confusion, unusual weakness or fatigue, bone pain  Infection--fever, chills, cough, or sore throat  Irregular menstrual cycles or spotting  Liver injury--right upper belly pain, loss of appetite, nausea, light-colored stool, dark yellow or brown urine, yellowing skin or eyes, unusual weakness or fatigue  Low red blood cell count--unusual weakness or fatigue, dizziness, headache, trouble breathing  Stroke--sudden numbness or weakness in the face, arm,  or leg, trouble speaking, confusion, trouble walking, loss of balance or coordination, dizziness, severe headache, change in vision  Unusual bruising or bleeding  Vaginal bleeding after menopause, pelvic pain  Side effects that usually do not require medical attention (report to your care team if they continue or are bothersome):    Hot flashes  Mood swings  Vaginal discharge  This list may not describe all possible side effects. Call your doctor for medical advice about side effects. You may report side effects to FDA at 7-185-JVH-0477.    Where should I keep my medication?  Keep out of the reach of children and pets.    Store at room temperature between 20 and 25 degrees C (68 and 77 degrees F). Protect from light. Get rid of any unused medication after the expiration date.    To get rid of medications that are no longer needed or have :    Take the medication to a medication take-back program. Check with your pharmacy or law enforcement to find a location.  If you cannot return the medication, check the label or package insert to see if the medication should be thrown out in the garbage or flushed down the toilet. If you are not sure, ask your care team. If it is safe to put it in the trash, empty the medication out of the container. Mix the medication with cat litter, dirt, coffee grounds, or other unwanted substance. Seal the mixture in a bag or container. Put it in the trash.  NOTE: This sheet is a summary. It may not cover all possible information. If you have questions about this medicine, talk to your doctor, pharmacist, or health care provider.    ©  [employer] Standard (2024 00:00:00)    Additional Information From [employer] About Nolvadex  Self-Care Tips:  Do not stop taking this medication unless your healthcare provider tells you. You may be on it for as long as 5 years.   If you are experiencing hot flashes, wearing light clothing, staying in a cool environment, and putting  cool cloths on your head may reduce symptoms. Consult you health care provider if these worsen, or become intolerable  This medication causes little nausea.  But if you should experience nausea, take anti-nausea medications as prescribed by your doctor, and eat small frequent meals.  Sucking on lozenges and chewing gum may also help.   Avoid sun exposure.  Wear SPF 15 (or higher) sunblock and protective clothing.  In general, drinking alcoholic beverages should be kept to a minimum or avoided completely.  You should discuss this with your doctor.  Get plenty of rest.   Maintain good nutrition.  If you experience symptoms or side effects, be sure to discuss them with your health care team.  They can prescribe medications and/or offer other suggestions that are effective in managing such problems.    When to contact your doctor or health care provider:  Seek emergency help immediately and notify your health care provider, it you experience the following symptoms:    Sudden shortness of breath and/or chest pain  The following symptoms require medical attention, but are not an emergency.  Contact your health care provider within 24 hours of noticing any of the following:    Swelling, redness and/or pain in one leg or arm and not the other  New breast lumps  Excessive vaginal discharge or bleeding, menstrual (period) pain or irregularities  Nausea (interferes with ability to eat and unrelieved with prescribed medication)  Depression (interfering with your ability to carry on your regular activities)  Changes in vision  Always inform your health care provider if you experience any unusual symptoms.              COMMON BRAND NAME(S): Eligard, Lupron Depot, Lutrate Depot    What should I tell my care team before I take this medication?  They need to know if you have any of these conditions:    Diabetes  Heart disease  Heart failure  High or low levels of electrolytes, such as magnesium, potassium, or sodium in your  blood  Irregular heartbeat or rhythm  Seizures  An unusual or allergic reaction to leuprolide, other medications, foods, dyes, or preservatives  Pregnant or trying to get pregnant  Breast-feeding  How should I use this medication?  This medication is injected under the skin or into a muscle. It is given by your care team in a hospital or clinic setting.    Talk to your care team about the use of this medication in children. Special care may be needed.    Overdosage: If you think you have taken too much of this medicine contact a poison control center or emergency room at once.    NOTE: This medicine is only for you. Do not share this medicine with others.    What if I miss a dose?  Keep appointments for follow-up doses. It is important not to miss your dose. Call your care team if you are unable to keep an appointment.    What may interact with this medication?  Do not take this medication with any of the following:    Cisapride  Dronedarone  Ketoconazole  Levoketoconazole  Pimozide  Thioridazine  This medication may also interact with the following:    Other medications that cause heart rhythm changes  This list may not describe all possible interactions. Give your health care provider a list of all the medicines, herbs, non-prescription drugs, or dietary supplements you use. Also tell them if you smoke, drink alcohol, or use illegal drugs. Some items may interact with your medicine.    What should I watch for while using this medication?  Your condition will be monitored carefully while you are receiving this medication. Tell your care team if your symptoms do not start to get better or if they get worse.    Heart attacks and strokes have been reported with the use of this medication. Get emergency help if you develop signs or symptoms of a heart attack or stroke. Talk to your care team about the risks and benefits of this medication.    This medication may cause serious skin reactions. They can happen weeks to  months after starting the medication. Talk to your care team right away if you have fevers or flu-like symptoms with a rash. The rash may be red or purple and then turn into blisters or peeling of the skin. Or you might notice a red rash with swelling of the face, lips, or lymph nodes in your neck or under your arms.    This medication may increase blood sugar. The risk may be higher in patients who already have diabetes. Ask your care team what you can do to lower your risk of diabetes while taking this medication.    This medication may cause infertility. Talk to your care team if you are concerned about your fertility.    What side effects may I notice from receiving this medication?  Side effects that you should report to your care team as soon as possible:    Allergic reactions--skin rash, itching, hives, swelling of the face, lips, tongue, or throat  Heart attack--pain or tightness in the chest, shoulders, arms, or jaw, nausea, shortness of breath, cold or clammy skin, feeling faint or lightheaded  Heart rhythm changes--fast or irregular heartbeat, dizziness, feeling faint or lightheaded, chest pain, trouble breathing  High blood sugar (hyperglycemia)--increased thirst or amount of urine, unusual weakness or fatigue, blurry vision  New or worsening seizures  Redness, blistering, peeling, or loosening of the skin, including inside the mouth  Stroke--sudden numbness or weakness of the face, arm, or leg, trouble speaking, confusion, trouble walking, loss of balance or coordination, dizziness, severe headache, change in vision  Swelling and pain of the tumor site or lymph nodes  Side effects that usually do not require medical attention (report these to your care team if they continue or are bothersome):    Change in sex drive or performance  Hot flashes  Joint pain  Pain, redness, or irritation at injection site  Swelling of the ankles, hands, or feet  Unusual weakness or fatigue  This list may not describe all  possible side effects. Call your doctor for medical advice about side effects. You may report side effects to FDA at 7-605-XZA-1503.    Where should I keep my medication?  This medication is given in a hospital or clinic. It will not be stored at home.    NOTE: This sheet is a summary. It may not cover all possible information. If you have questions about this medicine, talk to your doctor, pharmacist, or health care provider.    © 2025 [employer]/Gold Standard (2025-03-17 00:00:00)    Additional Information From [employer] About Lupron  Self-Care Tips:  If you are experiencing hot flashes, wearing light clothing, staying in a cool environment, and putting cool cloths on your head may reduce symptoms. Consult your health care provider if these worsen, or become intolerable.  Avoid sun exposure.  Wear SPF 15 (or higher) sunblock and protective clothing.  In general, drinking alcoholic beverages should be kept to a minimum or avoided completely.  You should discuss this with your doctor.  Get plenty of rest.   Maintain good nutrition.  If you experience symptoms or side effects, be sure to discuss them with your health care team.  They can prescribe medications and/or offer other suggestions that are effective in managing such problems.    When to contact your doctor or health care provider:  Contact your health care provider immediately, day or night, if you should experience any of the following symptoms:    Urinary retention or inability to urinate  Weakness, numbness or tingling in arms or legs  The following symptoms require medical attention, but are not an emergency.  Contact your health care provider within 24 hours of noticing any of the following:    Extreme fatigue (unable to carry on self-care activities)  Swelling of the feet or ankles.  Sudden weight gain  Swelling, redness and/or pain in one leg or arm and not the other  Changes in mood or memory  Always inform your health care provider if you  experience any unusual symptoms.
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
## 2025-07-03 NOTE — PROGRESS NOTES
Pt here for labs and a f/u with a provider . Port accessed and flushed with good blood return noted. 10cc of blood wasted prior to specimen collection. Blood specimen obtained and sent to lab for processing per protocol. Port flushed with saline and heparin prior to needle removal. Pt sent back to waiting room and [facility] [place]

## 2025-07-07 ENCOUNTER — HOSPITAL ENCOUNTER (OUTPATIENT)
Dept: CARDIOLOGY | Facility: HOSPITAL | Age: 45
Discharge: HOME | End: 2025-07-07
Admitting: INTERNAL MEDICINE
Payer: COMMERCIAL

## 2025-07-07 VITALS
WEIGHT: 284 LBS | SYSTOLIC BLOOD PRESSURE: 118 MMHG | HEIGHT: 63 IN | DIASTOLIC BLOOD PRESSURE: 66 MMHG | BODY MASS INDEX: 50.32 KG/M2

## 2025-07-07 DIAGNOSIS — Z79.899 ENCOUNTER FOR MONITORING CARDIOTOXIC DRUG THERAPY: ICD-10-CM

## 2025-07-07 DIAGNOSIS — Z51.81 ENCOUNTER FOR MONITORING CARDIOTOXIC DRUG THERAPY: ICD-10-CM

## 2025-07-07 LAB
AORTIC DIMENSIONLESS INDEX: 0.89 (DI)
AV MEAN PRESS GRAD SYS DOP V1V2: 3.5 MMHG
AV VMAX SYS DOP: 134.1 CM/SEC
BH CV ECHO LEFT VENTRICLE GLOBAL LONGITUDINAL STRAIN: -18.5 %
BH CV ECHO MEAS - AO MAX PG: 7.2 MMHG
BH CV ECHO MEAS - AO ROOT DIAM: 3.1 CM
BH CV ECHO MEAS - AO V2 VTI: 24 CM
BH CV ECHO MEAS - AVA(I,D): 2.8 CM2
BH CV ECHO MEAS - EDV(CUBED): 67.3 ML
BH CV ECHO MEAS - EDV(MOD-SP2): 123.1 ML
BH CV ECHO MEAS - EDV(MOD-SP4): 113.7 ML
BH CV ECHO MEAS - EF(MOD-SP2): 62.8 %
BH CV ECHO MEAS - EF(MOD-SP4): 60.1 %
BH CV ECHO MEAS - ESV(CUBED): 17.3 ML
BH CV ECHO MEAS - ESV(MOD-SP2): 45.7 ML
BH CV ECHO MEAS - ESV(MOD-SP4): 45.4 ML
BH CV ECHO MEAS - FS: 36.4 %
BH CV ECHO MEAS - IVS/LVPW: 1.09 CM
BH CV ECHO MEAS - IVSD: 1.18 CM
BH CV ECHO MEAS - LAT PEAK E' VEL: 9.1 CM/SEC
BH CV ECHO MEAS - LV DIASTOLIC VOL/BSA (35-75): 50.7 CM2
BH CV ECHO MEAS - LV MASS(C)D: 155 GRAMS
BH CV ECHO MEAS - LV MAX PG: 3.7 MMHG
BH CV ECHO MEAS - LV MEAN PG: 2.03 MMHG
BH CV ECHO MEAS - LV SYSTOLIC VOL/BSA (12-30): 20.2 CM2
BH CV ECHO MEAS - LV V1 MAX: 96.5 CM/SEC
BH CV ECHO MEAS - LV V1 VTI: 21.3 CM
BH CV ECHO MEAS - LVIDD: 4.1 CM
BH CV ECHO MEAS - LVIDS: 2.6 CM
BH CV ECHO MEAS - LVOT AREA: 3.2 CM2
BH CV ECHO MEAS - LVOT DIAM: 2 CM
BH CV ECHO MEAS - LVPWD: 1.08 CM
BH CV ECHO MEAS - MED PEAK E' VEL: 7 CM/SEC
BH CV ECHO MEAS - MV A DUR: 0.12 SEC
BH CV ECHO MEAS - MV A MAX VEL: 77.3 CM/SEC
BH CV ECHO MEAS - MV DEC SLOPE: 182.6 CM/SEC2
BH CV ECHO MEAS - MV DEC TIME: 0.35 SEC
BH CV ECHO MEAS - MV E MAX VEL: 64.7 CM/SEC
BH CV ECHO MEAS - MV E/A: 0.84
BH CV ECHO MEAS - MV MAX PG: 2.8 MMHG
BH CV ECHO MEAS - MV MEAN PG: 1.11 MMHG
BH CV ECHO MEAS - MV V2 VTI: 19.9 CM
BH CV ECHO MEAS - MVA(VTI): 3.4 CM2
BH CV ECHO MEAS - PA ACC TIME: 0.12 SEC
BH CV ECHO MEAS - PA V2 MAX: 79.8 CM/SEC
BH CV ECHO MEAS - PULM A REVS DUR: 0.12 SEC
BH CV ECHO MEAS - PULM A REVS VEL: 33.9 CM/SEC
BH CV ECHO MEAS - PULM DIAS VEL: 40.9 CM/SEC
BH CV ECHO MEAS - PULM S/D: 1.18
BH CV ECHO MEAS - PULM SYS VEL: 48.5 CM/SEC
BH CV ECHO MEAS - RAP SYSTOLE: 8 MMHG
BH CV ECHO MEAS - RV MAX PG: 0.89 MMHG
BH CV ECHO MEAS - RV V1 MAX: 47.1 CM/SEC
BH CV ECHO MEAS - RV V1 VTI: 9 CM
BH CV ECHO MEAS - RVDD: 3 CM
BH CV ECHO MEAS - SV(LVOT): 67.3 ML
BH CV ECHO MEAS - SV(MOD-SP2): 77.3 ML
BH CV ECHO MEAS - SV(MOD-SP4): 68.4 ML
BH CV ECHO MEAS - SVI(LVOT): 30 ML/M2
BH CV ECHO MEAS - SVI(MOD-SP2): 34.5 ML/M2
BH CV ECHO MEAS - SVI(MOD-SP4): 30.5 ML/M2
BH CV ECHO MEASUREMENTS AVERAGE E/E' RATIO: 8.04
LV EF BIPLANE MOD: 61 %

## 2025-07-07 PROCEDURE — 93306 TTE W/DOPPLER COMPLETE: CPT | Performed by: INTERNAL MEDICINE

## 2025-07-07 PROCEDURE — 93356 MYOCRD STRAIN IMG SPCKL TRCK: CPT

## 2025-07-07 PROCEDURE — 93356 MYOCRD STRAIN IMG SPCKL TRCK: CPT | Performed by: INTERNAL MEDICINE

## 2025-07-07 PROCEDURE — 93306 TTE W/DOPPLER COMPLETE: CPT

## 2025-07-09 DIAGNOSIS — C50.912 BREAST CANCER, STAGE 1, ESTROGEN RECEPTOR POSITIVE, LEFT: Primary | ICD-10-CM

## 2025-07-09 DIAGNOSIS — Z17.0 BREAST CANCER, STAGE 1, ESTROGEN RECEPTOR POSITIVE, LEFT: Primary | ICD-10-CM

## 2025-07-09 RX ORDER — ONDANSETRON 8 MG/1
8 TABLET, FILM COATED ORAL 3 TIMES DAILY PRN
Qty: 30 TABLET | Refills: 5 | Status: SHIPPED | OUTPATIENT
Start: 2025-07-09

## 2025-07-10 ENCOUNTER — HOSPITAL ENCOUNTER (OUTPATIENT)
Dept: ONCOLOGY | Facility: HOSPITAL | Age: 45
Discharge: HOME | End: 2025-07-10
Admitting: INTERNAL MEDICINE
Payer: COMMERCIAL

## 2025-07-10 VITALS
DIASTOLIC BLOOD PRESSURE: 80 MMHG | TEMPERATURE: 98.6 F | OXYGEN SATURATION: 97 % | WEIGHT: 277 LBS | BODY MASS INDEX: 49.08 KG/M2 | RESPIRATION RATE: 20 BRPM | HEART RATE: 96 BPM | SYSTOLIC BLOOD PRESSURE: 133 MMHG | HEIGHT: 63 IN

## 2025-07-10 DIAGNOSIS — C50.912 BREAST CANCER, STAGE 1, ESTROGEN RECEPTOR POSITIVE, LEFT: Primary | ICD-10-CM

## 2025-07-10 DIAGNOSIS — Z17.0 BREAST CANCER, STAGE 1, ESTROGEN RECEPTOR POSITIVE, LEFT: Primary | ICD-10-CM

## 2025-07-10 LAB
ALP BLD-CCNC: 63 U/L (ref 42–141)
B-HCG UR QL: NEGATIVE
BASOPHILS # BLD AUTO: 0.02 10*3/MM3 (ref 0–0.2)
BASOPHILS NFR BLD AUTO: 0.3 % (ref 0–1.5)
BUN BLDA-MCNC: 14 MG/DL (ref 7–22)
CALCIUM BLD QL: 9.5 MG/DL (ref 8–10.3)
CHLORIDE BLDA-SCNC: 107 MMOL/L (ref 98–108)
CO2 BLDA-SCNC: 26 MMOL/L (ref 18–33)
CREAT BLDA-MCNC: 1.1 MG/DL (ref 0.6–1.2)
DEPRECATED RDW RBC AUTO: 47.7 FL (ref 37–54)
EGFRCR SERPLBLD CKD-EPI 2021: 64.1 ML/MIN/1.73
EOSINOPHIL # BLD AUTO: 0.14 10*3/MM3 (ref 0–0.4)
EOSINOPHIL NFR BLD AUTO: 2 % (ref 0.3–6.2)
ERYTHROCYTE [DISTWIDTH] IN BLOOD BY AUTOMATED COUNT: 12.9 % (ref 12.3–15.4)
GLUCOSE BLDC GLUCOMTR-MCNC: 153 MG/DL (ref 73–118)
HCT VFR BLD AUTO: 38.1 % (ref 34–46.6)
HGB BLD-MCNC: 12 G/DL (ref 12–15.9)
IMM GRANULOCYTES # BLD AUTO: ABNORMAL 10*3/UL
IMM GRANULOCYTES NFR BLD AUTO: ABNORMAL %
LYMPHOCYTES # BLD AUTO: 2.87 10*3/MM3 (ref 0.7–3.1)
LYMPHOCYTES NFR BLD AUTO: 40 % (ref 19.6–45.3)
MCH RBC QN AUTO: 32.7 PG (ref 26.6–33)
MCHC RBC AUTO-ENTMCNC: 31.5 G/DL (ref 31.5–35.7)
MCV RBC AUTO: 103.8 FL (ref 79–97)
MONOCYTES # BLD AUTO: 0.33 10*3/MM3 (ref 0.1–0.9)
MONOCYTES NFR BLD AUTO: 4.6 % (ref 5–12)
NEUTROPHILS NFR BLD AUTO: 3.81 10*3/MM3 (ref 1.7–7)
NEUTROPHILS NFR BLD AUTO: 53.1 % (ref 42.7–76)
PLATELET # BLD AUTO: 322 10*3/MM3 (ref 140–450)
PMV BLD AUTO: 10.4 FL (ref 6–12)
POC ALBUMIN: 3.6 G/L (ref 3.3–5.5)
POC ALT (SGPT): 27 U/L (ref 10–47)
POC AST (SGOT): 35 U/L (ref 11–38)
POC TOTAL BILIRUBIN: 0.5 MG/DL (ref 0.2–1.6)
POC TOTAL PROTEIN: 7 G/DL (ref 6.4–8.1)
POTASSIUM BLDA-SCNC: 3.9 MMOL/L (ref 3.6–5.1)
RBC # BLD AUTO: 3.67 10*6/MM3 (ref 3.77–5.28)
SODIUM BLD-SCNC: 144 MMOL/L (ref 128–145)
WBC NRBC COR # BLD AUTO: 7.17 10*3/MM3 (ref 3.4–10.8)

## 2025-07-10 PROCEDURE — 25010000002 HEPARIN LOCK FLUSH PER 10 UNITS: Performed by: INTERNAL MEDICINE

## 2025-07-10 PROCEDURE — 96375 TX/PRO/DX INJ NEW DRUG ADDON: CPT

## 2025-07-10 PROCEDURE — 25810000003 SODIUM CHLORIDE 0.9 % SOLUTION 250 ML FLEX CONT: Performed by: INTERNAL MEDICINE

## 2025-07-10 PROCEDURE — 81025 URINE PREGNANCY TEST: CPT | Performed by: INTERNAL MEDICINE

## 2025-07-10 PROCEDURE — 85025 COMPLETE CBC W/AUTO DIFF WBC: CPT | Performed by: INTERNAL MEDICINE

## 2025-07-10 PROCEDURE — 25010000002 DEXAMETHASONE PER 1 MG: Performed by: INTERNAL MEDICINE

## 2025-07-10 PROCEDURE — 96413 CHEMO IV INFUSION 1 HR: CPT

## 2025-07-10 PROCEDURE — 25810000003 SODIUM CHLORIDE 0.9 % SOLUTION: Performed by: INTERNAL MEDICINE

## 2025-07-10 PROCEDURE — 25010000002 ADO-TRASTUZUMAB 160 MG RECONSTITUTED SOLUTION 160 MG VIAL: Performed by: INTERNAL MEDICINE

## 2025-07-10 PROCEDURE — 96415 CHEMO IV INFUSION ADDL HR: CPT

## 2025-07-10 PROCEDURE — 80053 COMPREHEN METABOLIC PANEL: CPT

## 2025-07-10 RX ORDER — HEPARIN SODIUM (PORCINE) LOCK FLUSH IV SOLN 100 UNIT/ML 100 UNIT/ML
500 SOLUTION INTRAVENOUS AS NEEDED
OUTPATIENT
Start: 2025-07-10

## 2025-07-10 RX ORDER — DEXAMETHASONE SODIUM PHOSPHATE 4 MG/ML
12 INJECTION, SOLUTION INTRA-ARTICULAR; INTRALESIONAL; INTRAMUSCULAR; INTRAVENOUS; SOFT TISSUE ONCE
Status: COMPLETED | OUTPATIENT
Start: 2025-07-10 | End: 2025-07-10

## 2025-07-10 RX ORDER — SODIUM CHLORIDE 0.9 % (FLUSH) 0.9 %
20 SYRINGE (ML) INJECTION AS NEEDED
Status: DISCONTINUED | OUTPATIENT
Start: 2025-07-10 | End: 2025-07-11 | Stop reason: HOSPADM

## 2025-07-10 RX ORDER — SODIUM CHLORIDE 9 MG/ML
20 INJECTION, SOLUTION INTRAVENOUS ONCE
Status: CANCELLED | OUTPATIENT
Start: 2025-07-10

## 2025-07-10 RX ORDER — SODIUM CHLORIDE 0.9 % (FLUSH) 0.9 %
20 SYRINGE (ML) INJECTION AS NEEDED
OUTPATIENT
Start: 2025-07-10

## 2025-07-10 RX ORDER — SODIUM CHLORIDE 9 MG/ML
20 INJECTION, SOLUTION INTRAVENOUS ONCE
Status: COMPLETED | OUTPATIENT
Start: 2025-07-10 | End: 2025-07-10

## 2025-07-10 RX ORDER — HEPARIN SODIUM (PORCINE) LOCK FLUSH IV SOLN 100 UNIT/ML 100 UNIT/ML
500 SOLUTION INTRAVENOUS AS NEEDED
Status: DISCONTINUED | OUTPATIENT
Start: 2025-07-10 | End: 2025-07-11 | Stop reason: HOSPADM

## 2025-07-10 RX ADMIN — ADO-TRASTUZUMAB EMTANSINE 455 MG: 20 INJECTION, POWDER, LYOPHILIZED, FOR SOLUTION INTRAVENOUS at 10:48

## 2025-07-10 RX ADMIN — DEXAMETHASONE SODIUM PHOSPHATE 12 MG: 4 INJECTION, SOLUTION INTRAMUSCULAR; INTRAVENOUS at 10:15

## 2025-07-10 RX ADMIN — SODIUM CHLORIDE 20 ML/HR: 9 INJECTION, SOLUTION INTRAVENOUS at 10:15

## 2025-07-10 RX ADMIN — Medication 500 UNITS: at 12:27

## 2025-07-10 RX ADMIN — Medication 20 ML: at 12:27

## 2025-07-10 NOTE — PROGRESS NOTES
Pt here for D1C1 Ado-Trastuzumab. Pt denies any new complaints.Pt states actively trying to loose weight and has had a weight decrease. Pharmacy and  aware.Port accessed and flushed with good blood return noted. 10cc of blood wasted prior to specimen collection. Blood specimen obtained and sent to lab for processing per protocol. Urine pregnancy test collected.Labs within parameters. Treatment given without difficulty and pt tolerated  well. Port flushed with saline and heparin prior to needle removal.  Pt discharged and AVS given.

## 2025-07-14 ENCOUNTER — TELEPHONE (OUTPATIENT)
Dept: FAMILY MEDICINE CLINIC | Facility: CLINIC | Age: 45
End: 2025-07-14

## 2025-07-14 DIAGNOSIS — Z12.4 PAP SMEAR FOR CERVICAL CANCER SCREENING: Primary | ICD-10-CM

## 2025-07-16 ENCOUNTER — TELEPHONE (OUTPATIENT)
Dept: PHYSICAL THERAPY | Facility: CLINIC | Age: 45
End: 2025-07-16

## 2025-07-16 NOTE — TELEPHONE ENCOUNTER
L/M FOR PATIENT THAT THEY MISSED TODAY'S 7/16 9:00AM APPOINTMENT. ADVISED PATIENT TO CALL BACK IF THEY WOULD LIKE TO RESCHEDULE [phone] OPTION 2.

## 2025-07-17 ENCOUNTER — OFFICE VISIT (OUTPATIENT)
Dept: FAMILY MEDICINE CLINIC | Facility: CLINIC | Age: 45
End: 2025-07-17
Payer: COMMERCIAL

## 2025-07-17 VITALS
RESPIRATION RATE: 18 BRPM | BODY MASS INDEX: 48.75 KG/M2 | WEIGHT: 264.9 LBS | SYSTOLIC BLOOD PRESSURE: 122 MMHG | TEMPERATURE: 97.1 F | HEIGHT: 62 IN | OXYGEN SATURATION: 99 % | DIASTOLIC BLOOD PRESSURE: 82 MMHG | HEART RATE: 94 BPM

## 2025-07-17 DIAGNOSIS — I10 HYPERTENSION, UNSPECIFIED TYPE: ICD-10-CM

## 2025-07-17 DIAGNOSIS — J30.89 SEASONAL ALLERGIC RHINITIS DUE TO OTHER ALLERGIC TRIGGER: ICD-10-CM

## 2025-07-17 DIAGNOSIS — F32.A ANXIETY AND DEPRESSION: Primary | ICD-10-CM

## 2025-07-17 DIAGNOSIS — M54.50 ACUTE MIDLINE LOW BACK PAIN WITHOUT SCIATICA: ICD-10-CM

## 2025-07-17 DIAGNOSIS — F41.9 ANXIETY AND DEPRESSION: Primary | ICD-10-CM

## 2025-07-17 PROBLEM — J30.9 ALLERGIC RHINITIS DUE TO ALLERGEN: Status: ACTIVE | Noted: 2025-07-17

## 2025-07-17 NOTE — PATIENT INSTRUCTIONS
Blood pressure goal < 130/80. Hear rate 70-80s.   Send me blood pressure and heart rate readings from home in next 1-2 weeks.

## 2025-07-17 NOTE — PROGRESS NOTES
Chief Complaint  Primary Care Follow-Up (anxiety)    Subjective        Mary Ann Fletcher presents to [facility]  History of Present Illness    Patient presents today to follow-up on hypertension, allergic rhinitis, anxiety/depression, and back pain.      Hypertension: Status is improved.  Current medication includes metoprolol 25 mg extended release daily.  No BP/HR at home.  Negative for headache, chest pain, palpitations, lower extremity swelling, and visual disturbance.    Allergies: Improved. Current medication includes Singulair 10 mg at bedtime (currently not taking). Taking H2 claritin over-the-counter daily.  Use of albuterol  as needed-1-2 x/daily. Some shortness of breath. No wheezing and no cough.    Vertigo: Improved. Current medication includes meclizine 25 mg as needed.    Low back pain update: Mildly improved. Physical therapy not started to date. Timing conflicted with infusions. Walking further without pain. Putting shoes on with less difficulty. Activity light this past week. Trying to get up every hour while working to stay mobile.     Anxiety/depression: Stable. Current prescribed medication includes Viibryd 20 mg daily and bupropion 150 mg extended release daily (stopped taking bupropion due to starting tamoxifen).  Propranolol 10 mg 3 times daily previously taken--Tough week at work. Finally got first menstrual cycle since December. No panic attacks since before surgery. Frustration of not being able to do things for self.     Breast cancer update: Follow-up visit completed with Dr. Peoples June 20, 2025- next visit in 1 week.  Follow-up visit completed with Dr. Saldivar July 3, 2025. Tamoxifen started; expecting lupron soon.  Next infusion 7/31/25. Phenergan for nausea at times. Taking positive efforts to stay hydrated.          Objective   Vital Signs:  /82 (BP Location: Left arm, Patient Position: Sitting, Cuff Size: Large Adult)   Pulse 94   Temp 97.1 °F  "(36.2 °C) (Temporal)   Resp 18   Ht 157.5 cm (62\")   Wt 120 kg (264 lb 14.4 oz)   SpO2 99%   BMI 48.45 kg/m²   Estimated body mass index is 48.45 kg/m² as calculated from the following:    Height as of this encounter: 157.5 cm (62\").    Weight as of this encounter: 120 kg (264 lb 14.4 oz).            Physical Exam  Constitutional:       General: She is not in acute distress.     Comments: Pleasant, converses appropriately    HENT:      Head: Normocephalic and atraumatic.      Right Ear: External ear normal.      Left Ear: External ear normal.      Nose: Nose normal.      Mouth/Throat:      Lips: Pink.      Mouth: Mucous membranes are moist.      Pharynx: Oropharynx is clear.   Eyes:      Conjunctiva/sclera: Conjunctivae normal.   Cardiovascular:      Rate and Rhythm: Normal rate and regular rhythm.      Pulses: Normal pulses.      Heart sounds: Normal heart sounds, S1 normal and S2 normal.   Pulmonary:      Effort: Pulmonary effort is normal.      Breath sounds: Normal breath sounds.   Musculoskeletal:         General: Normal range of motion.      Cervical back: Neck supple.      Right lower leg: No edema.      Left lower leg: No edema.   Skin:     General: Skin is warm and dry.   Neurological:      Mental Status: She is alert and oriented to person, place, and time.      Gait: Gait is intact.   Psychiatric:         Attention and Perception: Attention and perception normal.         Mood and Affect: Mood and affect normal.         Speech: Speech normal.         Behavior: Behavior normal.         Thought Content: Thought content normal. Thought content does not include homicidal or suicidal ideation.         Judgment: Judgment normal.        Result Review :    CMP          5/9/2025    12:53 5/21/2025    05:50 7/10/2025    09:42   CMP   Glucose 190      BUN 9      Creatinine 0.91   1.10    EGFR 80.4   64.1    Sodium 139      Potassium 3.3  3.9     Chloride 105      Calcium 9.2      BUN/Creatinine Ratio 9.9    "   Anion Gap 11.0        CBC          6/6/2025    10:31 7/3/2025    13:02 7/10/2025    09:18   CBC   WBC 7.28  9.81  7.17    RBC 3.14  3.40  3.67    Hemoglobin 10.8  11.1  12.0    Hematocrit 34.2  34.9  38.1    .9  102.6  103.8    MCH 34.4  32.6  32.7    MCHC 31.6  31.8  31.5    RDW 13.4  12.6  12.9    Platelets 282  286  322      CBC w/diff          6/6/2025    10:31 7/3/2025    13:02 7/10/2025    09:18   CBC w/Diff   WBC 7.28  9.81  7.17    RBC 3.14  3.40  3.67    Hemoglobin 10.8  11.1  12.0    Hematocrit 34.2  34.9  38.1    .9  102.6  103.8    MCH 34.4  32.6  32.7    MCHC 31.6  31.8  31.5    RDW 13.4  12.6  12.9    Platelets 282  286  322    Neutrophil Rel % 50.3  62.9  53.1    Lymphocyte Rel % 41.1  31.1  40.0    Monocyte Rel % 4.9  4.4  4.6    Eosinophil Rel % 3.3  1.5  2.0    Basophil Rel % 0.4  0.1  0.3      Lipid Panel          4/3/2025    15:00   Lipid Panel   Total Cholesterol 160    Triglycerides 331    HDL Cholesterol 29    VLDL Cholesterol 54    LDL Cholesterol  77    LDL/HDL Ratio 2.23      TSH          2/10/2025    20:52 4/3/2025    15:00   TSH   TSH 1.310  0.770      BMP          5/9/2025    12:53 5/21/2025    05:50 7/10/2025    09:42   BMP   BUN 9      Creatinine 0.91   1.10    Sodium 139      Potassium 3.3  3.9     Chloride 105      CO2 23.0      Calcium 9.2        Most Recent A1C          5/9/2025    12:53   HGBA1C Most Recent   Hemoglobin A1C 5.32               Echocardiogram Interpretation Summary 7/7/2025         Left ventricular ejection fraction appears to be 56 - 60%.    Left ventricular diastolic function is consistent with (grade I) impaired relaxation.              Assessment and Plan   Diagnoses and all orders for this visit:    1. Anxiety and depression (Primary)  Assessment & Plan:  Continue Viibryd 20 mg daily.  Discontinue bupropion 150 mg extended release daily.  Discontinue propranolol 10 mg as needed.  Follow-up in 3 months.      2. Hypertension, unspecified  type  Assessment & Plan:  Hypertension is stable.  Continue metoprolol 25 mg extended release daily.  Continue current treatment regimen.  Weight loss.  Regular aerobic exercise.  Blood pressure will be reassessed in 3 months.      3. Seasonal allergic rhinitis due to other allergic trigger  Assessment & Plan:  Continue your over-the-counter daily antihistamine.  Singulair 10 mg at bedtime if needed.  Albuterol as needed.  Update in 3 months.      4. Acute midline low back pain without sciatica  Assessment & Plan:  Recommended follow through with physical therapy when able.  Update at next visit.               Follow Up   Return in about 3 months (around 10/17/2025) for Annual physical.  Patient was given instructions and counseling regarding her condition or for health maintenance advice. Please see specific information pulled into the AVS if appropriate.

## 2025-07-18 NOTE — ASSESSMENT & PLAN NOTE
Hypertension is stable.  Continue metoprolol 25 mg extended release daily.  Continue current treatment regimen.  Weight loss.  Regular aerobic exercise.  Blood pressure will be reassessed in 3 months.

## 2025-07-18 NOTE — ASSESSMENT & PLAN NOTE
Continue your over-the-counter daily antihistamine.  Singulair 10 mg at bedtime if needed.  Albuterol as needed.  Update in 3 months.

## 2025-07-18 NOTE — ASSESSMENT & PLAN NOTE
Continue Viibryd 20 mg daily.  Discontinue bupropion 150 mg extended release daily.  Discontinue propranolol 10 mg as needed.  Follow-up in 3 months.

## 2025-07-19 DIAGNOSIS — J30.89 SEASONAL ALLERGIC RHINITIS DUE TO OTHER ALLERGIC TRIGGER: ICD-10-CM

## 2025-07-21 RX ORDER — MONTELUKAST SODIUM 10 MG/1
10 TABLET ORAL
Qty: 90 TABLET | Refills: 0 | Status: SHIPPED | OUTPATIENT
Start: 2025-07-21
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
## 2025-07-31 ENCOUNTER — OFFICE VISIT (OUTPATIENT)
Dept: ONCOLOGY | Facility: CLINIC | Age: 45
End: 2025-07-31
Payer: COMMERCIAL

## 2025-07-31 ENCOUNTER — HOSPITAL ENCOUNTER (OUTPATIENT)
Dept: ONCOLOGY | Facility: HOSPITAL | Age: 45
Discharge: HOME | End: 2025-07-31
Admitting: INTERNAL MEDICINE
Payer: COMMERCIAL

## 2025-07-31 VITALS
SYSTOLIC BLOOD PRESSURE: 133 MMHG | HEIGHT: 63 IN | BODY MASS INDEX: 48.55 KG/M2 | OXYGEN SATURATION: 97 % | WEIGHT: 274 LBS | DIASTOLIC BLOOD PRESSURE: 77 MMHG | RESPIRATION RATE: 18 BRPM | HEART RATE: 88 BPM | TEMPERATURE: 97.3 F

## 2025-07-31 VITALS
OXYGEN SATURATION: 97 % | HEIGHT: 63 IN | DIASTOLIC BLOOD PRESSURE: 77 MMHG | HEART RATE: 88 BPM | TEMPERATURE: 97.3 F | WEIGHT: 274.8 LBS | BODY MASS INDEX: 48.69 KG/M2 | SYSTOLIC BLOOD PRESSURE: 133 MMHG | RESPIRATION RATE: 18 BRPM

## 2025-07-31 DIAGNOSIS — Z17.0 BREAST CANCER, STAGE 1, ESTROGEN RECEPTOR POSITIVE, LEFT: Primary | ICD-10-CM

## 2025-07-31 DIAGNOSIS — C50.912 BREAST CANCER, STAGE 1, ESTROGEN RECEPTOR POSITIVE, LEFT: Primary | ICD-10-CM

## 2025-07-31 LAB
ALP BLD-CCNC: 65 U/L (ref 42–141)
BASOPHILS # BLD AUTO: 0.01 10*3/MM3 (ref 0–0.2)
BASOPHILS NFR BLD AUTO: 0.1 % (ref 0–1.5)
BUN BLDA-MCNC: 16 MG/DL (ref 7–22)
CALCIUM BLD QL: 9.3 MG/DL (ref 8–10.3)
CHLORIDE BLDA-SCNC: 106 MMOL/L (ref 98–108)
CO2 BLDA-SCNC: 26 MMOL/L (ref 18–33)
CREAT BLDA-MCNC: 1 MG/DL (ref 0.6–1.2)
DEPRECATED RDW RBC AUTO: 47.3 FL (ref 37–54)
EGFRCR SERPLBLD CKD-EPI 2021: 71.8 ML/MIN/1.73
EOSINOPHIL # BLD AUTO: 0.14 10*3/MM3 (ref 0–0.4)
EOSINOPHIL NFR BLD AUTO: 1.5 % (ref 0.3–6.2)
ERYTHROCYTE [DISTWIDTH] IN BLOOD BY AUTOMATED COUNT: 13 % (ref 12.3–15.4)
GLUCOSE BLDC GLUCOMTR-MCNC: 122 MG/DL (ref 73–118)
HCT VFR BLD AUTO: 35.2 % (ref 34–46.6)
HGB BLD-MCNC: 11.3 G/DL (ref 12–15.9)
IMM GRANULOCYTES # BLD AUTO: 0.04 10*3/MM3 (ref 0–0.05)
IMM GRANULOCYTES NFR BLD AUTO: 0.4 % (ref 0–0.5)
LYMPHOCYTES # BLD AUTO: 3.56 10*3/MM3 (ref 0.7–3.1)
LYMPHOCYTES NFR BLD AUTO: 37.3 % (ref 19.6–45.3)
MCH RBC QN AUTO: 31.7 PG (ref 26.6–33)
MCHC RBC AUTO-ENTMCNC: 32.1 G/DL (ref 31.5–35.7)
MCV RBC AUTO: 98.9 FL (ref 79–97)
MONOCYTES # BLD AUTO: 0.35 10*3/MM3 (ref 0.1–0.9)
MONOCYTES NFR BLD AUTO: 3.7 % (ref 5–12)
NEUTROPHILS NFR BLD AUTO: 5.44 10*3/MM3 (ref 1.7–7)
NEUTROPHILS NFR BLD AUTO: 57 % (ref 42.7–76)
PLATELET # BLD AUTO: 279 10*3/MM3 (ref 140–450)
PMV BLD AUTO: 10.7 FL (ref 6–12)
POC ALBUMIN: 3.3 G/L (ref 3.3–5.5)
POC ALT (SGPT): 22 U/L (ref 10–47)
POC AST (SGOT): 31 U/L (ref 11–38)
POC TOTAL BILIRUBIN: 0.5 MG/DL (ref 0.2–1.6)
POC TOTAL PROTEIN: 6.9 G/DL (ref 6.4–8.1)
POTASSIUM BLDA-SCNC: 3.4 MMOL/L (ref 3.6–5.1)
RBC # BLD AUTO: 3.56 10*6/MM3 (ref 3.77–5.28)
SODIUM BLD-SCNC: 140 MMOL/L (ref 128–145)
WBC NRBC COR # BLD AUTO: 9.54 10*3/MM3 (ref 3.4–10.8)

## 2025-07-31 PROCEDURE — 80053 COMPREHEN METABOLIC PANEL: CPT

## 2025-07-31 PROCEDURE — 25810000003 SODIUM CHLORIDE 0.9 % SOLUTION 250 ML FLEX CONT: Performed by: INTERNAL MEDICINE

## 2025-07-31 PROCEDURE — 96413 CHEMO IV INFUSION 1 HR: CPT

## 2025-07-31 PROCEDURE — 25810000003 SODIUM CHLORIDE 0.9 % SOLUTION: Performed by: INTERNAL MEDICINE

## 2025-07-31 PROCEDURE — 85025 COMPLETE CBC W/AUTO DIFF WBC: CPT | Performed by: INTERNAL MEDICINE

## 2025-07-31 PROCEDURE — 25010000002 DEXAMETHASONE PER 1 MG: Performed by: INTERNAL MEDICINE

## 2025-07-31 PROCEDURE — 96402 CHEMO HORMON ANTINEOPL SQ/IM: CPT

## 2025-07-31 PROCEDURE — 96375 TX/PRO/DX INJ NEW DRUG ADDON: CPT

## 2025-07-31 PROCEDURE — 25010000002 HEPARIN LOCK FLUSH PER 10 UNITS: Performed by: INTERNAL MEDICINE

## 2025-07-31 RX ORDER — HEPARIN SODIUM (PORCINE) LOCK FLUSH IV SOLN 100 UNIT/ML 100 UNIT/ML
500 SOLUTION INTRAVENOUS AS NEEDED
Status: DISCONTINUED | OUTPATIENT
Start: 2025-07-31 | End: 2025-08-01 | Stop reason: HOSPADM

## 2025-07-31 RX ORDER — SODIUM CHLORIDE 0.9 % (FLUSH) 0.9 %
20 SYRINGE (ML) INJECTION AS NEEDED
OUTPATIENT
Start: 2025-07-31

## 2025-07-31 RX ORDER — HEPARIN SODIUM (PORCINE) LOCK FLUSH IV SOLN 100 UNIT/ML 100 UNIT/ML
500 SOLUTION INTRAVENOUS AS NEEDED
OUTPATIENT
Start: 2025-07-31

## 2025-07-31 RX ORDER — DEXAMETHASONE SODIUM PHOSPHATE 4 MG/ML
12 INJECTION, SOLUTION INTRA-ARTICULAR; INTRALESIONAL; INTRAMUSCULAR; INTRAVENOUS; SOFT TISSUE ONCE
Status: COMPLETED | OUTPATIENT
Start: 2025-07-31 | End: 2025-07-31

## 2025-07-31 RX ORDER — SODIUM CHLORIDE 9 MG/ML
20 INJECTION, SOLUTION INTRAVENOUS ONCE
Status: CANCELLED | OUTPATIENT
Start: 2025-07-31

## 2025-07-31 RX ORDER — SODIUM CHLORIDE 0.9 % (FLUSH) 0.9 %
20 SYRINGE (ML) INJECTION AS NEEDED
Status: DISCONTINUED | OUTPATIENT
Start: 2025-07-31 | End: 2025-08-01 | Stop reason: HOSPADM

## 2025-07-31 RX ORDER — POTASSIUM CHLORIDE 1500 MG/1
40 TABLET, EXTENDED RELEASE ORAL ONCE
Qty: 2 TABLET | Refills: 0 | Status: SHIPPED | OUTPATIENT
Start: 2025-07-31 | End: 2025-07-31

## 2025-07-31 RX ORDER — SODIUM CHLORIDE 9 MG/ML
20 INJECTION, SOLUTION INTRAVENOUS ONCE
Status: COMPLETED | OUTPATIENT
Start: 2025-07-31 | End: 2025-07-31

## 2025-07-31 RX ADMIN — DEXAMETHASONE SODIUM PHOSPHATE 12 MG: 4 INJECTION INTRA-ARTICULAR; INTRALESIONAL; INTRAMUSCULAR; INTRAVENOUS; SOFT TISSUE at 13:26

## 2025-07-31 RX ADMIN — HEPARIN 500 UNITS: 100 SYRINGE at 14:40

## 2025-07-31 RX ADMIN — SODIUM CHLORIDE 20 ML/HR: 9 INJECTION, SOLUTION INTRAVENOUS at 13:25

## 2025-07-31 RX ADMIN — SODIUM CHLORIDE 455 MG: 0.9 INJECTION, SOLUTION INTRAVENOUS at 13:31

## 2025-07-31 RX ADMIN — Medication 20 ML: at 14:40

## 2025-08-21 ENCOUNTER — HOSPITAL ENCOUNTER (OUTPATIENT)
Dept: ONCOLOGY | Facility: HOSPITAL | Age: 45
Discharge: HOME | End: 2025-08-21
Admitting: INTERNAL MEDICINE
Payer: COMMERCIAL

## 2025-08-21 VITALS
WEIGHT: 274.5 LBS | BODY MASS INDEX: 48.64 KG/M2 | TEMPERATURE: 96.6 F | HEART RATE: 90 BPM | DIASTOLIC BLOOD PRESSURE: 84 MMHG | SYSTOLIC BLOOD PRESSURE: 123 MMHG | OXYGEN SATURATION: 95 % | RESPIRATION RATE: 20 BRPM | HEIGHT: 63 IN

## 2025-08-21 DIAGNOSIS — Z17.0 BREAST CANCER, STAGE 1, ESTROGEN RECEPTOR POSITIVE, LEFT: Primary | ICD-10-CM

## 2025-08-21 DIAGNOSIS — C50.912 BREAST CANCER, STAGE 1, ESTROGEN RECEPTOR POSITIVE, LEFT: Primary | ICD-10-CM

## 2025-08-21 LAB
ALBUMIN SERPL-MCNC: 4.1 G/DL (ref 3.5–5.2)
ALBUMIN/GLOB SERPL: 1.4 G/DL
ALP SERPL-CCNC: 58 U/L (ref 39–117)
ALT SERPL W P-5'-P-CCNC: 20 U/L (ref 1–33)
ANION GAP SERPL CALCULATED.3IONS-SCNC: 12 MMOL/L (ref 5–15)
AST SERPL-CCNC: 30 U/L (ref 1–32)
B-HCG UR QL: NEGATIVE
BASOPHILS # BLD AUTO: 0.02 10*3/MM3 (ref 0–0.2)
BASOPHILS NFR BLD AUTO: 0.2 % (ref 0–1.5)
BILIRUB SERPL-MCNC: 0.3 MG/DL (ref 0–1.2)
BUN SERPL-MCNC: 14 MG/DL (ref 6–20)
BUN/CREAT SERPL: 13.1 (ref 7–25)
CALCIUM SPEC-SCNC: 9.3 MG/DL (ref 8.6–10.5)
CHLORIDE SERPL-SCNC: 103 MMOL/L (ref 98–107)
CO2 SERPL-SCNC: 24 MMOL/L (ref 22–29)
CREAT SERPL-MCNC: 1.07 MG/DL (ref 0.57–1)
DEPRECATED RDW RBC AUTO: 48 FL (ref 37–54)
EGFRCR SERPLBLD CKD-EPI 2021: 66.2 ML/MIN/1.73
EOSINOPHIL # BLD AUTO: 0.13 10*3/MM3 (ref 0–0.4)
EOSINOPHIL NFR BLD AUTO: 1.6 % (ref 0.3–6.2)
ERYTHROCYTE [DISTWIDTH] IN BLOOD BY AUTOMATED COUNT: 13.7 % (ref 12.3–15.4)
GLOBULIN UR ELPH-MCNC: 2.9 GM/DL
GLUCOSE SERPL-MCNC: 103 MG/DL (ref 65–99)
HCT VFR BLD AUTO: 34.3 % (ref 34–46.6)
HGB BLD-MCNC: 11.4 G/DL (ref 12–15.9)
IMM GRANULOCYTES # BLD AUTO: 0.01 10*3/MM3 (ref 0–0.05)
IMM GRANULOCYTES NFR BLD AUTO: 0.1 % (ref 0–0.5)
LYMPHOCYTES # BLD AUTO: 3.46 10*3/MM3 (ref 0.7–3.1)
LYMPHOCYTES NFR BLD AUTO: 42.6 % (ref 19.6–45.3)
MCH RBC QN AUTO: 31.5 PG (ref 26.6–33)
MCHC RBC AUTO-ENTMCNC: 33.2 G/DL (ref 31.5–35.7)
MCV RBC AUTO: 94.8 FL (ref 79–97)
MONOCYTES # BLD AUTO: 0.36 10*3/MM3 (ref 0.1–0.9)
MONOCYTES NFR BLD AUTO: 4.4 % (ref 5–12)
NEUTROPHILS NFR BLD AUTO: 4.15 10*3/MM3 (ref 1.7–7)
NEUTROPHILS NFR BLD AUTO: 51.1 % (ref 42.7–76)
PLATELET # BLD AUTO: 271 10*3/MM3 (ref 140–450)
PMV BLD AUTO: 10.4 FL (ref 6–12)
POTASSIUM SERPL-SCNC: 3.5 MMOL/L (ref 3.5–5.2)
PROT SERPL-MCNC: 7 G/DL (ref 6–8.5)
RBC # BLD AUTO: 3.62 10*6/MM3 (ref 3.77–5.28)
SODIUM SERPL-SCNC: 139 MMOL/L (ref 136–145)
WBC NRBC COR # BLD AUTO: 8.13 10*3/MM3 (ref 3.4–10.8)

## 2025-08-21 PROCEDURE — 81025 URINE PREGNANCY TEST: CPT | Performed by: INTERNAL MEDICINE

## 2025-08-21 PROCEDURE — 25810000003 SODIUM CHLORIDE 0.9 % SOLUTION 250 ML FLEX CONT: Performed by: NURSE PRACTITIONER

## 2025-08-21 PROCEDURE — 85025 COMPLETE CBC W/AUTO DIFF WBC: CPT | Performed by: INTERNAL MEDICINE

## 2025-08-21 PROCEDURE — 96375 TX/PRO/DX INJ NEW DRUG ADDON: CPT

## 2025-08-21 PROCEDURE — 25010000002 HEPARIN LOCK FLUSH PER 10 UNITS: Performed by: INTERNAL MEDICINE

## 2025-08-21 PROCEDURE — 25010000002 DEXAMETHASONE PER 1 MG: Performed by: NURSE PRACTITIONER

## 2025-08-21 PROCEDURE — 96413 CHEMO IV INFUSION 1 HR: CPT

## 2025-08-21 PROCEDURE — 25010000002 ADO-TRASTUZUMAB 100 MG RECONSTITUTED SOLUTION 1 EACH VIAL: Performed by: NURSE PRACTITIONER

## 2025-08-21 PROCEDURE — 25010000002 ADO-TRASTUZUMAB 160 MG RECONSTITUTED SOLUTION 160 MG VIAL: Performed by: NURSE PRACTITIONER

## 2025-08-21 RX ORDER — DEXAMETHASONE SODIUM PHOSPHATE 4 MG/ML
12 INJECTION, SOLUTION INTRA-ARTICULAR; INTRALESIONAL; INTRAMUSCULAR; INTRAVENOUS; SOFT TISSUE ONCE
Status: COMPLETED | OUTPATIENT
Start: 2025-08-21 | End: 2025-08-21

## 2025-08-21 RX ORDER — HEPARIN SODIUM (PORCINE) LOCK FLUSH IV SOLN 100 UNIT/ML 100 UNIT/ML
500 SOLUTION INTRAVENOUS AS NEEDED
OUTPATIENT
Start: 2025-08-21

## 2025-08-21 RX ORDER — HEPARIN SODIUM (PORCINE) LOCK FLUSH IV SOLN 100 UNIT/ML 100 UNIT/ML
500 SOLUTION INTRAVENOUS AS NEEDED
Status: DISCONTINUED | OUTPATIENT
Start: 2025-08-21 | End: 2025-08-22 | Stop reason: HOSPADM

## 2025-08-21 RX ORDER — SODIUM CHLORIDE 0.9 % (FLUSH) 0.9 %
20 SYRINGE (ML) INJECTION AS NEEDED
Status: DISCONTINUED | OUTPATIENT
Start: 2025-08-21 | End: 2025-08-22 | Stop reason: HOSPADM

## 2025-08-21 RX ORDER — SODIUM CHLORIDE 0.9 % (FLUSH) 0.9 %
20 SYRINGE (ML) INJECTION AS NEEDED
OUTPATIENT
Start: 2025-08-21

## 2025-08-21 RX ORDER — SODIUM CHLORIDE 9 MG/ML
20 INJECTION, SOLUTION INTRAVENOUS ONCE
Status: CANCELLED | OUTPATIENT
Start: 2025-08-21

## 2025-08-21 RX ADMIN — HEPARIN 500 UNITS: 100 SYRINGE at 12:48

## 2025-08-21 RX ADMIN — DEXAMETHASONE SODIUM PHOSPHATE 12 MG: 4 INJECTION INTRA-ARTICULAR; INTRALESIONAL; INTRAMUSCULAR; INTRAVENOUS; SOFT TISSUE at 11:57

## 2025-08-21 RX ADMIN — Medication 20 ML: at 12:48

## 2025-08-21 RX ADMIN — SODIUM CHLORIDE 455 MG: 9 INJECTION, SOLUTION INTRAVENOUS at 12:16

## 2025-08-28 ENCOUNTER — HOSPITAL ENCOUNTER (OUTPATIENT)
Dept: ONCOLOGY | Facility: HOSPITAL | Age: 45
Discharge: HOME | End: 2025-08-28
Payer: COMMERCIAL

## 2025-08-28 ENCOUNTER — OFFICE VISIT (OUTPATIENT)
Dept: ONCOLOGY | Facility: CLINIC | Age: 45
End: 2025-08-28
Payer: COMMERCIAL

## 2025-08-28 VITALS
HEART RATE: 80 BPM | DIASTOLIC BLOOD PRESSURE: 85 MMHG | OXYGEN SATURATION: 96 % | SYSTOLIC BLOOD PRESSURE: 134 MMHG | RESPIRATION RATE: 20 BRPM | HEIGHT: 63 IN | BODY MASS INDEX: 48.37 KG/M2 | WEIGHT: 273 LBS | TEMPERATURE: 97.9 F

## 2025-08-28 DIAGNOSIS — C50.912 BREAST CANCER, STAGE 1, ESTROGEN RECEPTOR POSITIVE, LEFT: Primary | ICD-10-CM

## 2025-08-28 DIAGNOSIS — Z17.0 BREAST CANCER, STAGE 1, ESTROGEN RECEPTOR POSITIVE, LEFT: Primary | ICD-10-CM

## 2025-08-28 DIAGNOSIS — Z51.81 ENCOUNTER FOR MONITORING CARDIOTOXIC DRUG THERAPY: ICD-10-CM

## 2025-08-28 DIAGNOSIS — Z79.899 ENCOUNTER FOR MONITORING CARDIOTOXIC DRUG THERAPY: ICD-10-CM

## 2025-08-28 LAB
BASOPHILS # BLD AUTO: 0.02 10*3/MM3 (ref 0–0.2)
BASOPHILS NFR BLD AUTO: 0.2 % (ref 0–1.5)
DEPRECATED RDW RBC AUTO: 48.9 FL (ref 37–54)
EOSINOPHIL # BLD AUTO: 0.14 10*3/MM3 (ref 0–0.4)
EOSINOPHIL NFR BLD AUTO: 1.5 % (ref 0.3–6.2)
ERYTHROCYTE [DISTWIDTH] IN BLOOD BY AUTOMATED COUNT: 14.1 % (ref 12.3–15.4)
HCT VFR BLD AUTO: 37.7 % (ref 34–46.6)
HGB BLD-MCNC: 12.4 G/DL (ref 12–15.9)
IMM GRANULOCYTES # BLD AUTO: 0.06 10*3/MM3 (ref 0–0.05)
IMM GRANULOCYTES NFR BLD AUTO: 0.6 % (ref 0–0.5)
LYMPHOCYTES # BLD AUTO: 3.83 10*3/MM3 (ref 0.7–3.1)
LYMPHOCYTES NFR BLD AUTO: 40.2 % (ref 19.6–45.3)
MCH RBC QN AUTO: 31.2 PG (ref 26.6–33)
MCHC RBC AUTO-ENTMCNC: 32.9 G/DL (ref 31.5–35.7)
MCV RBC AUTO: 95 FL (ref 79–97)
MONOCYTES # BLD AUTO: 0.64 10*3/MM3 (ref 0.1–0.9)
MONOCYTES NFR BLD AUTO: 6.7 % (ref 5–12)
NEUTROPHILS NFR BLD AUTO: 4.84 10*3/MM3 (ref 1.7–7)
NEUTROPHILS NFR BLD AUTO: 50.8 % (ref 42.7–76)
PLATELET # BLD AUTO: 145 10*3/MM3 (ref 140–450)
PMV BLD AUTO: 11.6 FL (ref 6–12)
RBC # BLD AUTO: 3.97 10*6/MM3 (ref 3.77–5.28)
WBC NRBC COR # BLD AUTO: 9.53 10*3/MM3 (ref 3.4–10.8)

## 2025-08-28 PROCEDURE — 85025 COMPLETE CBC W/AUTO DIFF WBC: CPT | Performed by: INTERNAL MEDICINE

## 2025-08-28 PROCEDURE — 96402 CHEMO HORMON ANTINEOPL SQ/IM: CPT

## 2025-08-28 PROCEDURE — 25010000002 HEPARIN LOCK FLUSH PER 10 UNITS: Performed by: INTERNAL MEDICINE

## 2025-08-28 PROCEDURE — 36591 DRAW BLOOD OFF VENOUS DEVICE: CPT

## 2025-08-28 RX ORDER — SODIUM CHLORIDE 0.9 % (FLUSH) 0.9 %
20 SYRINGE (ML) INJECTION AS NEEDED
OUTPATIENT
Start: 2025-08-28

## 2025-08-28 RX ORDER — SODIUM CHLORIDE 0.9 % (FLUSH) 0.9 %
20 SYRINGE (ML) INJECTION AS NEEDED
Status: DISCONTINUED | OUTPATIENT
Start: 2025-08-28 | End: 2025-08-30 | Stop reason: HOSPADM

## 2025-08-28 RX ORDER — HEPARIN SODIUM (PORCINE) LOCK FLUSH IV SOLN 100 UNIT/ML 100 UNIT/ML
500 SOLUTION INTRAVENOUS AS NEEDED
Status: DISCONTINUED | OUTPATIENT
Start: 2025-08-28 | End: 2025-08-30 | Stop reason: HOSPADM

## 2025-08-28 RX ORDER — HEPARIN SODIUM (PORCINE) LOCK FLUSH IV SOLN 100 UNIT/ML 100 UNIT/ML
500 SOLUTION INTRAVENOUS AS NEEDED
OUTPATIENT
Start: 2025-08-28

## 2025-08-28 RX ADMIN — Medication 20 ML: at 14:49

## 2025-08-28 RX ADMIN — HEPARIN 500 UNITS: 100 SYRINGE at 14:49

## 2025-08-29 ENCOUNTER — OFFICE VISIT (OUTPATIENT)
Dept: FAMILY MEDICINE CLINIC | Facility: CLINIC | Age: 45
End: 2025-08-29
Payer: COMMERCIAL

## 2025-08-29 VITALS
SYSTOLIC BLOOD PRESSURE: 146 MMHG | BODY MASS INDEX: 48.81 KG/M2 | TEMPERATURE: 96.8 F | HEIGHT: 63 IN | OXYGEN SATURATION: 98 % | DIASTOLIC BLOOD PRESSURE: 100 MMHG | RESPIRATION RATE: 18 BRPM | WEIGHT: 275.5 LBS | HEART RATE: 99 BPM

## 2025-08-29 DIAGNOSIS — I10 HYPERTENSION, UNSPECIFIED TYPE: Primary | ICD-10-CM

## 2025-08-29 DIAGNOSIS — E66.813 CLASS 3 SEVERE OBESITY DUE TO EXCESS CALORIES WITH BODY MASS INDEX (BMI) OF 45.0 TO 49.9 IN ADULT: ICD-10-CM

## 2025-08-29 DIAGNOSIS — F32.A ANXIETY AND DEPRESSION: ICD-10-CM

## 2025-08-29 DIAGNOSIS — F41.9 ANXIETY AND DEPRESSION: ICD-10-CM

## 2025-08-29 PROCEDURE — 99214 OFFICE O/P EST MOD 30 MIN: CPT | Performed by: NURSE PRACTITIONER

## 2025-08-29 RX ORDER — BUPROPION HYDROCHLORIDE 150 MG/1
150 TABLET ORAL DAILY
Qty: 90 TABLET | Refills: 1 | Status: SHIPPED | OUTPATIENT
Start: 2025-08-29

## 2025-08-29 RX ORDER — LISINOPRIL 10 MG/1
10 TABLET ORAL DAILY
Qty: 90 TABLET | Refills: 0 | Status: SHIPPED | OUTPATIENT
Start: 2025-08-29

## (undated) DEVICE — KT SURG TURNOVER 050

## (undated) DEVICE — SYR ANGIO FNGR FIX CONTRL MLL 10ML

## (undated) DEVICE — NDL HYPO PRECISIONGLIDE REG 25G 1 1/2

## (undated) DEVICE — SOLUTION,WATER,IRRIGATION,1000ML,STERILE: Brand: MEDLINE

## (undated) DEVICE — GOWN,SIRUS,POLYRNF,BRTHSLV,2XL,18/CS: Brand: MEDLINE

## (undated) DEVICE — DECANTER: Brand: UNBRANDED

## (undated) DEVICE — PAD WND VAC TRAC OR SENSATRAC

## (undated) DEVICE — PK MINOR LAPAROTOMY 50

## (undated) DEVICE — UNDRGLV SURG BIOGEL PIMICROINDICATOR SYNTH SZ7.5PF STRL PR/2

## (undated) DEVICE — WET SKIN PREP TRAY: Brand: MEDLINE INDUSTRIES, INC.

## (undated) DEVICE — INTENDED FOR TISSUE SEPARATION, AND OTHER PROCEDURES THAT REQUIRE A SHARP SURGICAL BLADE TO PUNCTURE OR CUT.: Brand: BARD-PARKER ® CARBON RIB-BACK BLADES

## (undated) DEVICE — PROVE COVER: Brand: UNBRANDED

## (undated) DEVICE — GOWN,SIRUS,POLYRNF,BRTHSLV,XL,30/CS: Brand: MEDLINE

## (undated) DEVICE — GOWN,REINFRCE,POLY,SIRUS,BREATH SLV,XXLG: Brand: MEDLINE

## (undated) DEVICE — SYR LL TP 10ML STRL

## (undated) DEVICE — PROXIMATE RH ROTATING HEAD SKIN STAPLERS (35 WIDE) CONTAINS 35 STAINLESS STEEL STAPLES: Brand: PROXIMATE

## (undated) DEVICE — ANTIBACTERIAL UNDYED BRAIDED (POLYGLACTIN 910), SYNTHETIC ABSORBABLE SUTURE: Brand: COATED VICRYL

## (undated) DEVICE — SYR LUERLOK 30CC

## (undated) DEVICE — GLV SURG BIOGEL LTX PF 7 1/2

## (undated) DEVICE — BIOPATCH™ ANTIMICROBIAL DRESSING WITH CHLORHEXIDINE GLUCONATE IS A HYDROPHILLIC POLYURETHANE ABSORPTIVE FOAM WITH CHLORHEXIDINE GLUCONATE (CHG) WHICH INHIBITS BACTERIAL GROWTH UNDER THE DRESSING. THE DRESSING IS INTENDED TO BE USED TO ABSORB EXUDATE, COVER A WOUND CAUSED BY VASCULAR AND NONVASCULAR PERCUTANEOUS MEDICAL DEVICES DURING SURGERY, AS WELL AS REDUCE LOCAL INFECTION AND COLONIZATION OF MICROORGANISMS.: Brand: BIOPATCH

## (undated) DEVICE — SUT MNCRYL 4/0 PS2 27IN UD MCP426H

## (undated) DEVICE — NDL HYPO PRECISIONGLIDE/REG 18G 1IN PNK

## (undated) DEVICE — SOL IRR NACL 0.9PCT BO 1000ML

## (undated) DEVICE — SPNG LAP PREWSH SFTPK 18X18IN STRL PK/5

## (undated) DEVICE — SUT PDS 3/0 PLS MONO 1X27IN SH VIL PDP316H

## (undated) DEVICE — STRIP PACKING W IODOFORM 1/4

## (undated) DEVICE — PACK,UNIVERSAL,NO GOWNS: Brand: MEDLINE

## (undated) DEVICE — GAUZE,SPONGE,FLUFF,6"X6.75",STRL,5/TRAY: Brand: MEDLINE

## (undated) DEVICE — THE STERILE CAMERA HANDLE COVER IS FOR USE WITH THE STERIS SURGICAL LIGHTING AND VISUALIZATION SYSTEMS.

## (undated) DEVICE — SYR LUERLOK 50ML

## (undated) DEVICE — ELECTRD BLD EZ CLN MOD XLNG 2.75IN

## (undated) DEVICE — DRSNG SURESITE WNDW 4X4.5

## (undated) DEVICE — SUT PDS 3/0 SH 27IN DYED Z316H

## (undated) DEVICE — THE STERILE LIGHT HANDLE COVER IS USED WITH STERIS SURGICAL LIGHTING AND VISUALIZATION SYSTEMS.

## (undated) DEVICE — ADHS SKIN PREMIERPRO EXOFIN TOPICAL HI/VISC .5ML

## (undated) DEVICE — GLOVE,SURG,SENSICARE SLT,LF,PF,6: Brand: MEDLINE

## (undated) DEVICE — ROCKER SWITCH PENCIL,BLADE ELECTRODE, HOLSTER, SMOKE ATTACHMENT: Brand: EDGE

## (undated) DEVICE — PAD,ABDOMINAL,5"X9",STERILE,LF,1/PK: Brand: MEDLINE INDUSTRIES, INC.

## (undated) DEVICE — SYR LUERLOK 20CC BX/50

## (undated) DEVICE — DRAIN,WOUND,15FR,3/16,FULL-FLUTED: Brand: MEDLINE

## (undated) DEVICE — SOL IRR NACL 0.9PCT 3000ML

## (undated) DEVICE — SUT MNCRYL 3/0 PS2 18IN MCP497G

## (undated) DEVICE — COVER,MAYO STAND,STERILE: Brand: MEDLINE

## (undated) DEVICE — PENCL SMOKE/EVAC MEGADYNE TELESCP 15FT

## (undated) DEVICE — DEV OPN LIGASURE SM/JAW 28D 16.5MM 18.8CM 1P/U

## (undated) DEVICE — TOWEL,OR,DSP,ST,BLUE,STD,4/PK,20PK/CS: Brand: MEDLINE

## (undated) DEVICE — PAD, GROUNDING, UNIVERSAL, SPLIT, 9': Brand: MEDLINE

## (undated) DEVICE — C-ARM: Brand: UNBRANDED

## (undated) DEVICE — GARMENT COMPR 16V 1ST STAGE VEST XL 40IN BGE

## (undated) DEVICE — GLOVE,SURG,SENSICARE SLT,LF,PF,7: Brand: MEDLINE

## (undated) DEVICE — SUT ETHLN 4/0 FS2 18IN 662H

## (undated) DEVICE — SYR LUERLOK 5CC

## (undated) DEVICE — DRN WND EVAC BULB 100CC

## (undated) DEVICE — MARKER SKIN W/RULER DUAL: Brand: MEDLINE INDUSTRIES, INC.

## (undated) DEVICE — SUT SILK 2/0 FS BLK 18IN 685G

## (undated) DEVICE — GLV SURG BIOGEL M LTX PF 6 1/2

## (undated) DEVICE — 3M™ STERI-STRIP™ REINFORCED ADHESIVE SKIN CLOSURES, R1547, 1/2 IN X 4 IN (12 MM X 100 MM), 6 STRIPS/ENVELOPE: Brand: 3M™ STERI-STRIP™

## (undated) DEVICE — PREP SOL DYNA-HEX CHG LIQ 4% BT 4OZ